# Patient Record
Sex: FEMALE | Race: WHITE | Employment: OTHER | URBAN - METROPOLITAN AREA
[De-identification: names, ages, dates, MRNs, and addresses within clinical notes are randomized per-mention and may not be internally consistent; named-entity substitution may affect disease eponyms.]

---

## 2017-05-25 ENCOUNTER — HOSPITAL ENCOUNTER (OUTPATIENT)
Dept: ULTRASOUND IMAGING | Age: 42
Discharge: HOME | End: 2017-05-25
Attending: UROLOGY | Admitting: UROLOGY

## 2017-05-25 DIAGNOSIS — N30.20 ACUTE ON CHRONIC CYSTITIS: ICD-10-CM

## 2017-05-25 DIAGNOSIS — N30.00 ACUTE ON CHRONIC CYSTITIS: ICD-10-CM

## 2017-05-25 DIAGNOSIS — N31.9 NEUROGENIC DYSFUNCTION OF THE URINARY BLADDER: ICD-10-CM

## 2017-05-25 DIAGNOSIS — N31.9 NEUROMUSCULAR DYSFUNCTION OF BLADDER: ICD-10-CM

## 2018-05-10 ENCOUNTER — HOSPITAL ENCOUNTER (OUTPATIENT)
Dept: ULTRASOUND IMAGING | Age: 43
Discharge: HOME | End: 2018-05-10
Attending: UROLOGY | Admitting: UROLOGY

## 2018-05-10 DIAGNOSIS — N31.9 NEUROMUSCULAR DYSFUNCTION OF BLADDER: ICD-10-CM

## 2018-05-10 DIAGNOSIS — N31.9 NEUROGENIC DYSFUNCTION OF THE URINARY BLADDER: ICD-10-CM

## 2020-01-30 RX ORDER — TRIMETHOPRIM 100 MG/1
100 TABLET ORAL DAILY
COMMUNITY

## 2020-02-05 ENCOUNTER — ANESTHESIA (OUTPATIENT)
Dept: OPERATING ROOM | Age: 45
End: 2020-02-05

## 2020-02-05 ENCOUNTER — ANESTHESIA EVENT (OUTPATIENT)
Dept: OPERATING ROOM | Age: 45
End: 2020-02-05

## 2020-02-05 ENCOUNTER — HOSPITAL ENCOUNTER (OUTPATIENT)
Age: 45
Setting detail: OUTPATIENT SURGERY
Discharge: HOME | End: 2020-02-05
Attending: DENTIST | Admitting: DENTIST

## 2020-02-05 VITALS
OXYGEN SATURATION: 100 % | DIASTOLIC BLOOD PRESSURE: 70 MMHG | SYSTOLIC BLOOD PRESSURE: 122 MMHG | RESPIRATION RATE: 7 BRPM

## 2020-02-05 VITALS
TEMPERATURE: 96.7 F | WEIGHT: 58 LBS | BODY MASS INDEX: 13.42 KG/M2 | DIASTOLIC BLOOD PRESSURE: 74 MMHG | RESPIRATION RATE: 18 BRPM | SYSTOLIC BLOOD PRESSURE: 121 MMHG | OXYGEN SATURATION: 100 % | HEIGHT: 55 IN | HEART RATE: 90 BPM

## 2020-02-05 LAB — PREGNANCY, URINE: NEGATIVE

## 2020-02-05 PROCEDURE — 2580000003 HC RX 258: Performed by: DENTIST

## 2020-02-05 PROCEDURE — 84703 CHORIONIC GONADOTROPIN ASSAY: CPT

## 2020-02-05 PROCEDURE — 2500000003 HC RX 250 WO HCPCS: Performed by: DENTIST

## 2020-02-05 PROCEDURE — 3700000000 HC ANESTHESIA ATTENDED CARE: Performed by: DENTIST

## 2020-02-05 PROCEDURE — 7100000011 HC PHASE II RECOVERY - ADDTL 15 MIN: Performed by: DENTIST

## 2020-02-05 PROCEDURE — 6360000002 HC RX W HCPCS: Performed by: DENTIST

## 2020-02-05 PROCEDURE — 7100000010 HC PHASE II RECOVERY - FIRST 15 MIN: Performed by: DENTIST

## 2020-02-05 PROCEDURE — 3700000001 HC ADD 15 MINUTES (ANESTHESIA): Performed by: DENTIST

## 2020-02-05 PROCEDURE — 6360000002 HC RX W HCPCS: Performed by: NURSE ANESTHETIST, CERTIFIED REGISTERED

## 2020-02-05 PROCEDURE — 2500000003 HC RX 250 WO HCPCS: Performed by: NURSE ANESTHETIST, CERTIFIED REGISTERED

## 2020-02-05 PROCEDURE — 3600000013 HC SURGERY LEVEL 3 ADDTL 15MIN: Performed by: DENTIST

## 2020-02-05 PROCEDURE — 6370000000 HC RX 637 (ALT 250 FOR IP): Performed by: DENTIST

## 2020-02-05 PROCEDURE — 2580000003 HC RX 258: Performed by: ANESTHESIOLOGY

## 2020-02-05 PROCEDURE — 7100000001 HC PACU RECOVERY - ADDTL 15 MIN: Performed by: DENTIST

## 2020-02-05 PROCEDURE — 7100000000 HC PACU RECOVERY - FIRST 15 MIN: Performed by: DENTIST

## 2020-02-05 PROCEDURE — 3600000003 HC SURGERY LEVEL 3 BASE: Performed by: DENTIST

## 2020-02-05 PROCEDURE — 2709999900 HC NON-CHARGEABLE SUPPLY: Performed by: DENTIST

## 2020-02-05 RX ORDER — ONDANSETRON 2 MG/ML
INJECTION INTRAMUSCULAR; INTRAVENOUS PRN
Status: DISCONTINUED | OUTPATIENT
Start: 2020-02-05 | End: 2020-02-05 | Stop reason: SDUPTHER

## 2020-02-05 RX ORDER — ROCURONIUM BROMIDE 10 MG/ML
INJECTION, SOLUTION INTRAVENOUS PRN
Status: DISCONTINUED | OUTPATIENT
Start: 2020-02-05 | End: 2020-02-05 | Stop reason: SDUPTHER

## 2020-02-05 RX ORDER — MEPERIDINE HYDROCHLORIDE 50 MG/ML
12.5 INJECTION INTRAMUSCULAR; INTRAVENOUS; SUBCUTANEOUS EVERY 5 MIN PRN
Status: DISCONTINUED | OUTPATIENT
Start: 2020-02-05 | End: 2020-02-05 | Stop reason: HOSPADM

## 2020-02-05 RX ORDER — LIDOCAINE HYDROCHLORIDE 10 MG/ML
0.3 INJECTION, SOLUTION EPIDURAL; INFILTRATION; INTRACAUDAL; PERINEURAL
Status: DISCONTINUED | OUTPATIENT
Start: 2020-02-05 | End: 2020-02-05 | Stop reason: HOSPADM

## 2020-02-05 RX ORDER — OXYCODONE HYDROCHLORIDE AND ACETAMINOPHEN 5; 325 MG/1; MG/1
2 TABLET ORAL PRN
Status: DISCONTINUED | OUTPATIENT
Start: 2020-02-05 | End: 2020-02-05 | Stop reason: HOSPADM

## 2020-02-05 RX ORDER — DEXAMETHASONE SODIUM PHOSPHATE 10 MG/ML
INJECTION INTRAMUSCULAR; INTRAVENOUS PRN
Status: DISCONTINUED | OUTPATIENT
Start: 2020-02-05 | End: 2020-02-05 | Stop reason: SDUPTHER

## 2020-02-05 RX ORDER — PHENYLEPHRINE HCL IN 0.9% NACL 1 MG/10 ML
SYRINGE (ML) INTRAVENOUS PRN
Status: DISCONTINUED | OUTPATIENT
Start: 2020-02-05 | End: 2020-02-05 | Stop reason: SDUPTHER

## 2020-02-05 RX ORDER — DIPHENHYDRAMINE HYDROCHLORIDE 50 MG/ML
6.25 INJECTION INTRAMUSCULAR; INTRAVENOUS
Status: DISCONTINUED | OUTPATIENT
Start: 2020-02-05 | End: 2020-02-05 | Stop reason: HOSPADM

## 2020-02-05 RX ORDER — FENTANYL CITRATE 50 UG/ML
INJECTION, SOLUTION INTRAMUSCULAR; INTRAVENOUS PRN
Status: DISCONTINUED | OUTPATIENT
Start: 2020-02-05 | End: 2020-02-05 | Stop reason: SDUPTHER

## 2020-02-05 RX ORDER — PROPOFOL 10 MG/ML
INJECTION, EMULSION INTRAVENOUS PRN
Status: DISCONTINUED | OUTPATIENT
Start: 2020-02-05 | End: 2020-02-05 | Stop reason: SDUPTHER

## 2020-02-05 RX ORDER — BUPIVACAINE HYDROCHLORIDE AND EPINEPHRINE 2.5; 5 MG/ML; UG/ML
INJECTION, SOLUTION EPIDURAL; INFILTRATION; INTRACAUDAL; PERINEURAL PRN
Status: DISCONTINUED | OUTPATIENT
Start: 2020-02-05 | End: 2020-02-05 | Stop reason: ALTCHOICE

## 2020-02-05 RX ORDER — SODIUM CHLORIDE 0.9 % (FLUSH) 0.9 %
10 SYRINGE (ML) INJECTION EVERY 12 HOURS SCHEDULED
Status: DISCONTINUED | OUTPATIENT
Start: 2020-02-05 | End: 2020-02-05 | Stop reason: HOSPADM

## 2020-02-05 RX ORDER — OXYMETAZOLINE HYDROCHLORIDE 0.05 G/100ML
2 SPRAY NASAL ONCE
Status: COMPLETED | OUTPATIENT
Start: 2020-02-05 | End: 2020-02-05

## 2020-02-05 RX ORDER — SODIUM CHLORIDE, SODIUM LACTATE, POTASSIUM CHLORIDE, CALCIUM CHLORIDE 600; 310; 30; 20 MG/100ML; MG/100ML; MG/100ML; MG/100ML
INJECTION, SOLUTION INTRAVENOUS CONTINUOUS
Status: DISCONTINUED | OUTPATIENT
Start: 2020-02-05 | End: 2020-02-05 | Stop reason: HOSPADM

## 2020-02-05 RX ORDER — SODIUM CHLORIDE 0.9 % (FLUSH) 0.9 %
10 SYRINGE (ML) INJECTION PRN
Status: DISCONTINUED | OUTPATIENT
Start: 2020-02-05 | End: 2020-02-05 | Stop reason: HOSPADM

## 2020-02-05 RX ORDER — HYDRALAZINE HYDROCHLORIDE 20 MG/ML
5 INJECTION INTRAMUSCULAR; INTRAVENOUS EVERY 30 MIN PRN
Status: DISCONTINUED | OUTPATIENT
Start: 2020-02-05 | End: 2020-02-05 | Stop reason: HOSPADM

## 2020-02-05 RX ORDER — MAGNESIUM HYDROXIDE 1200 MG/15ML
LIQUID ORAL CONTINUOUS PRN
Status: COMPLETED | OUTPATIENT
Start: 2020-02-05 | End: 2020-02-05

## 2020-02-05 RX ORDER — ONDANSETRON 2 MG/ML
4 INJECTION INTRAMUSCULAR; INTRAVENOUS EVERY 30 MIN PRN
Status: DISCONTINUED | OUTPATIENT
Start: 2020-02-05 | End: 2020-02-05 | Stop reason: HOSPADM

## 2020-02-05 RX ORDER — OXYMETAZOLINE HYDROCHLORIDE 0.05 G/100ML
SPRAY NASAL
Status: DISCONTINUED
Start: 2020-02-05 | End: 2020-02-05 | Stop reason: HOSPADM

## 2020-02-05 RX ORDER — LIDOCAINE HYDROCHLORIDE 20 MG/ML
INJECTION, SOLUTION INFILTRATION; PERINEURAL PRN
Status: DISCONTINUED | OUTPATIENT
Start: 2020-02-05 | End: 2020-02-05 | Stop reason: SDUPTHER

## 2020-02-05 RX ORDER — MIDAZOLAM HYDROCHLORIDE 1 MG/ML
INJECTION INTRAMUSCULAR; INTRAVENOUS PRN
Status: DISCONTINUED | OUTPATIENT
Start: 2020-02-05 | End: 2020-02-05 | Stop reason: SDUPTHER

## 2020-02-05 RX ORDER — LABETALOL HYDROCHLORIDE 5 MG/ML
5 INJECTION, SOLUTION INTRAVENOUS
Status: DISCONTINUED | OUTPATIENT
Start: 2020-02-05 | End: 2020-02-05 | Stop reason: HOSPADM

## 2020-02-05 RX ORDER — OXYCODONE HYDROCHLORIDE AND ACETAMINOPHEN 5; 325 MG/1; MG/1
1 TABLET ORAL PRN
Status: DISCONTINUED | OUTPATIENT
Start: 2020-02-05 | End: 2020-02-05 | Stop reason: HOSPADM

## 2020-02-05 RX ADMIN — FENTANYL CITRATE 25 MCG: 50 INJECTION INTRAMUSCULAR; INTRAVENOUS at 07:32

## 2020-02-05 RX ADMIN — LIDOCAINE HYDROCHLORIDE 40 MG: 20 INJECTION, SOLUTION INFILTRATION; PERINEURAL at 07:33

## 2020-02-05 RX ADMIN — Medication 100 MCG: at 07:39

## 2020-02-05 RX ADMIN — PROPOFOL 60 MG: 10 INJECTION, EMULSION INTRAVENOUS at 07:32

## 2020-02-05 RX ADMIN — SODIUM CHLORIDE, POTASSIUM CHLORIDE, SODIUM LACTATE AND CALCIUM CHLORIDE: 600; 310; 30; 20 INJECTION, SOLUTION INTRAVENOUS at 07:07

## 2020-02-05 RX ADMIN — ONDANSETRON 2.6 MG: 2 INJECTION INTRAMUSCULAR; INTRAVENOUS at 07:45

## 2020-02-05 RX ADMIN — DEXAMETHASONE SODIUM PHOSPHATE 2.6 MG: 10 INJECTION INTRAMUSCULAR; INTRAVENOUS at 07:45

## 2020-02-05 RX ADMIN — CEFAZOLIN 1 G: 1 INJECTION, POWDER, FOR SOLUTION INTRAMUSCULAR; INTRAVENOUS at 07:39

## 2020-02-05 RX ADMIN — ROCURONIUM BROMIDE 15 MG: 10 SOLUTION INTRAVENOUS at 07:33

## 2020-02-05 RX ADMIN — LIDOCAINE HYDROCHLORIDE 60 MG: 20 INJECTION, SOLUTION INFILTRATION; PERINEURAL at 07:32

## 2020-02-05 RX ADMIN — Medication 200 MCG: at 07:42

## 2020-02-05 RX ADMIN — OXYMETAZOLINE HCL 2 SPRAY: 0.05 SPRAY NASAL at 07:07

## 2020-02-05 RX ADMIN — MIDAZOLAM HYDROCHLORIDE 2 MG: 2 INJECTION, SOLUTION INTRAMUSCULAR; INTRAVENOUS at 07:22

## 2020-02-05 RX ADMIN — SUGAMMADEX 60 MG: 100 INJECTION, SOLUTION INTRAVENOUS at 08:00

## 2020-02-05 ASSESSMENT — PULMONARY FUNCTION TESTS
PIF_VALUE: 13
PIF_VALUE: 1
PIF_VALUE: 14
PIF_VALUE: 18
PIF_VALUE: 2
PIF_VALUE: 18
PIF_VALUE: 3
PIF_VALUE: 18
PIF_VALUE: 24
PIF_VALUE: 17
PIF_VALUE: 15
PIF_VALUE: 0
PIF_VALUE: 15
PIF_VALUE: 1
PIF_VALUE: 0
PIF_VALUE: 14
PIF_VALUE: 24
PIF_VALUE: 0
PIF_VALUE: 35
PIF_VALUE: 17
PIF_VALUE: 0
PIF_VALUE: 2
PIF_VALUE: 23
PIF_VALUE: 14
PIF_VALUE: 0
PIF_VALUE: 18
PIF_VALUE: 28
PIF_VALUE: 0
PIF_VALUE: 0
PIF_VALUE: 25
PIF_VALUE: 19
PIF_VALUE: 3
PIF_VALUE: 0
PIF_VALUE: 3
PIF_VALUE: 0
PIF_VALUE: 0
PIF_VALUE: 14
PIF_VALUE: 0
PIF_VALUE: 2
PIF_VALUE: 19
PIF_VALUE: 18
PIF_VALUE: 17
PIF_VALUE: 25
PIF_VALUE: 18
PIF_VALUE: 22
PIF_VALUE: 15
PIF_VALUE: 0
PIF_VALUE: 0
PIF_VALUE: 6
PIF_VALUE: 26
PIF_VALUE: 3
PIF_VALUE: 0
PIF_VALUE: 1
PIF_VALUE: 0
PIF_VALUE: 0
PIF_VALUE: 2
PIF_VALUE: 15
PIF_VALUE: 18
PIF_VALUE: 0
PIF_VALUE: 18

## 2020-02-05 ASSESSMENT — PAIN SCALES - GENERAL
PAINLEVEL_OUTOF10: 0

## 2020-02-05 NOTE — ANESTHESIA POSTPROCEDURE EVALUATION
Department of Anesthesiology  Postprocedure Note    Patient: Angely Vasquez  MRN: [id number]  YOB: 1975  Date of evaluation: 2/5/2020  Time:  2:50 PM     Procedure Summary     Date:  02/05/20 Room / Location:  [facility] [address] [facility]    Anesthesia Start:  2878 Anesthesia Stop:  1797    Procedure:  DENTAL EXTRACTION # 19 (N/A Mouth) Diagnosis:       Primary active dental caries extending into pulp      (DENTAL CARIES)    Surgeon:  Reinier Kilgore DMD Responsible Provider:  Joselo Rivas MD    Anesthesia Type:  general ASA Status:  2          Anesthesia Type: general    Lewis Phase I: Lewis Score: 10    Lewis Phase II: Lewis Score: 10    Last vitals: Reviewed and per EMR flowsheets.        Anesthesia Post Evaluation    Comments: Postoperative Anesthesia Note    Name:    Angely Vasquez  MRN:      [id number]    Patient Vitals in the past 12 hrs:  02/05/20 0915, BP:121/74, Pulse:90, Resp:18, SpO2:100 %  02/05/20 0900, BP:126/75, Pulse:83, Resp:10, SpO2:99 %  02/05/20 0850, BP:131/80, Pulse:94, Resp:16, SpO2:98 %  02/05/20 0840, BP:132/84, Pulse:91, Resp:14, SpO2:97 %  02/05/20 0835, BP:122/78, Temp:96.7 °F (35.9 °C), Temp src:Temporal, Pulse:94, Resp:8, SpO2:98 %  02/05/20 0830, BP:119/78, Pulse:96, Resp:12, SpO2:99 %  02/05/20 0825, Pulse:94, Resp:18, SpO2:97 %  02/05/20 0820, BP:124/73, Pulse:90, Resp:18, SpO2:96 %  02/05/20 0815, BP:113/69, Temp:97.6 °F (36.4 °C), Temp src:Temporal, Pulse:95, Resp:16, SpO2:99 %  02/05/20 0651, BP:(!) 156/91, Temp:98.3 °F (36.8 °C), Pulse:109, Resp:16, SpO2:100 %     LABS:    CBC  Lab Results       Component                Value               Date/Time                  WBC                      9.5                 02/14/2016 05:21 AM        HGB                      11.0 (L)            02/14/2016 05:21 AM        HCT                      35.1 (L)            02/14/2016 05:21 AM        PLT                      690 (H)             02/14/2016 05:21 AM RENAL  Lab Results       Component                Value               Date/Time                  NA                       136                 02/14/2016 05:21 AM        K                        4.3                 02/14/2016 05:21 AM        CL                       95 (L)              02/14/2016 05:21 AM        CO2                      33 (H)              02/14/2016 05:21 AM        BUN                      10                  02/14/2016 05:21 AM        CREATININE               <0.5 (L)            02/14/2016 05:21 AM        GLUCOSE                  103 (H)             02/14/2016 05:21 AM   COAGS  No results found for: PROTIME, INR, APTT    Intake & Output: In: 900 (P.O.:100; I.V.:800)  Out: -     Nausea & Vomiting:  No    Level of Consciousness:  Awake    Pain Assessment:  Adequate analgesia    Anesthesia Complications:  No apparent anesthetic complications    SUMMARY      Vital signs stable  OK to discharge from Stage I post anesthesia care.   Care transferred from Anesthesiology department on discharge from perioperative area

## 2020-02-05 NOTE — ANESTHESIA PRE PROCEDURE
0.3 mL  0.3 mL Intradermal Once PRN Sue Mayfield MD        CEFAZOLIN 2 G D5W 100 ML IVPB IVPB             oxymetazoline (AFRIN) 0.05 % nasal spray             HYDROmorphone (DILAUDID) injection 0.5 mg  0.5 mg Intravenous Q10 Min PRN Ana Encinas MD        HYDROmorphone (DILAUDID) injection 0.5 mg  0.5 mg Intravenous Q5 Min PRN Ana Encinas MD        oxyCODONE-acetaminophen (PERCOCET) 5-325 MG per tablet 1 tablet  1 tablet Oral PRN Ana Encinas MD        Or    oxyCODONE-acetaminophen (PERCOCET) 5-325 MG per tablet 2 tablet  2 tablet Oral PRN Ana Encinas MD        diphenhydrAMINE (BENADRYL) injection 6.25 mg  6.25 mg Intravenous Once PRN Ana Encinas MD        ondansetron [facility]) injection 4 mg  4 mg Intravenous Q30 Min PRN Ana Encinas MD        labetalol (NORMODYNE;TRANDATE) injection 5 mg  5 mg Intravenous Q15 Min PRN Ana Encinas MD        hydrALAZINE (APRESOLINE) injection 5 mg  5 mg Intravenous Q30 Min PRN Ana Encinas MD        meperidine (DEMEROL) injection 12.5 mg  12.5 mg Intravenous Q5 Min PRN Ana Encinas MD           Allergies:  No Known Allergies    Problem List:    Patient Active Problem List   Diagnosis Code    Pneumonia J18.9    HTN (hypertension) I10    Spina bifida ([facility] [address] Q05.9       Past Medical History:        Diagnosis Date    Hypertension     Mental retardation     functions ~ age 14    Scoliosis     Hard time laying flat    Spina bifida ([address]     Wheelchair bound    Thyroid disease     hyperthyroidism    Urinary incontinence     self caths QID       Past Surgical History:        Procedure Laterality Date    BACK SURGERY      Rods for scoliosis    LEG SURGERY      Multiple    VENTRICULOPERITONEAL SHUNT      WISDOM TOOTH EXTRACTION         Social History:    Social History     Tobacco Use    Smoking status: Never Smoker    Smokeless tobacco: Never Used   Substance Use Topics    Alcohol use:  No Counseling given: Not Answered      Vital Signs (Current):   Vitals:    01/30/20 0821 02/05/20 0651   BP:  (!) 156/91   Pulse:  109   Resp:  16   Temp:  98.3 °F (36.8 °C)   SpO2:  100%   Weight: 58 lb (26.3 kg)    Height: 4' (1.219 m)                                               BP Readings from Last 3 Encounters:   02/05/20 (!) 156/91   02/14/16 130/76   08/17/12 127/84       NPO Status: Time of last liquid consumption: 2000                        Time of last solid consumption: 2000                        Date of last liquid consumption: 02/04/20                        Date of last solid food consumption: 02/04/20    BMI:   Wt Readings from Last 3 Encounters:   01/30/20 58 lb (26.3 kg)   02/07/16 64 lb (29 kg)   01/06/12 58 lb (26.3 kg)     Body mass index is 17.7 kg/m². CBC:   Lab Results   Component Value Date    WBC 9.5 02/14/2016    RBC 4.28 02/14/2016    HGB 11.0 02/14/2016    HCT 35.1 02/14/2016    MCV 82.0 02/14/2016    RDW 14.8 02/14/2016     02/14/2016       CMP:   Lab Results   Component Value Date     02/14/2016    K 4.3 02/14/2016    CL 95 02/14/2016    CO2 33 02/14/2016    BUN 10 02/14/2016    CREATININE <0.5 02/14/2016    GFRAA >60 02/14/2016    GFRAA >60 11/29/2011    AGRATIO 0.8 02/07/2016    LABGLOM >60 02/14/2016    GLUCOSE 103 02/14/2016    PROT 7.1 02/07/2016    PROT 7.7 11/29/2011    CALCIUM 9.0 02/14/2016    BILITOT <0.2 02/07/2016    ALKPHOS 81 02/07/2016    AST 21 02/07/2016    ALT 11 02/07/2016       POC Tests: No results for input(s): POCGLU, POCNA, POCK, POCCL, POCBUN, POCHEMO, POCHCT in the last 72 hours.     Coags: No results found for: PROTIME, INR, APTT    HCG (If Applicable): No results found for: PREGTESTUR, PREGSERUM, HCG, HCGQUANT     ABGs:   Lab Results   Component Value Date    PHART 7.449 02/09/2016    PO2ART 67.6 02/09/2016    MIU6FQH 48.7 02/09/2016    NHZ7MSV 33.0 02/09/2016    BEART 7.9 02/09/2016    Z9PBABYG 93.8 02/09/2016 Type & Screen (If Applicable):  No results found for: LABABO, [address]    Anesthesia Evaluation  Patient summary reviewed and Nursing notes reviewed no history of anesthetic complications:   Airway: Mallampati: II     Neck ROM: limited   Dental:          Pulmonary:   (+) pneumonia:                             Cardiovascular:    (+) hypertension:,                   Neuro/Psych:   (+) psychiatric history:            GI/Hepatic/Renal:             Endo/Other:                     Abdominal:           Vascular:                                      Anesthesia Plan      general     ASA 3       Induction: intravenous. Anesthetic plan and risks discussed with patient. Plan discussed with CRNA.                 Jessica Smith MD   2/5/2020

## 2021-06-24 ENCOUNTER — HOSPITAL ENCOUNTER (OUTPATIENT)
Dept: ULTRASOUND IMAGING | Age: 46
Discharge: HOME | End: 2021-06-24
Payer: MEDICARE

## 2021-06-24 ENCOUNTER — HOSPITAL ENCOUNTER (OUTPATIENT)
Age: 46
Discharge: HOME | End: 2021-06-24
Payer: MEDICARE

## 2021-06-24 DIAGNOSIS — R33.9 RETENTION OF URINE, UNSPECIFIED: ICD-10-CM

## 2021-06-24 DIAGNOSIS — N30.20 CHRONIC CYSTITIS WITHOUT HEMATURIA: ICD-10-CM

## 2021-06-24 DIAGNOSIS — N31.9 NEUROMUSCULAR DYSFUNCTION OF BLADDER, UNSPECIFIED: ICD-10-CM

## 2021-06-24 PROCEDURE — 82607 VITAMIN B-12: CPT

## 2021-06-24 PROCEDURE — 76770 US EXAM ABDO BACK WALL COMP: CPT

## 2021-06-24 PROCEDURE — 36415 COLL VENOUS BLD VENIPUNCTURE: CPT

## 2021-06-24 PROCEDURE — 80048 BASIC METABOLIC PNL TOTAL CA: CPT

## 2021-06-25 LAB
ANION GAP SERPL CALCULATED.3IONS-SCNC: 13 MMOL/L (ref 3–16)
BUN BLDV-MCNC: 10 MG/DL (ref 7–20)
CALCIUM SERPL-MCNC: 9.7 MG/DL (ref 8.3–10.6)
CHLORIDE BLD-SCNC: 99 MMOL/L (ref 99–110)
CO2: 26 MMOL/L (ref 21–32)
CREAT SERPL-MCNC: <0.5 MG/DL (ref 0.6–1.1)
GFR AFRICAN AMERICAN: >60
GFR NON-AFRICAN AMERICAN: >60
GLUCOSE BLD-MCNC: 64 MG/DL (ref 70–99)
POTASSIUM SERPL-SCNC: 4.6 MMOL/L (ref 3.5–5.1)
SODIUM BLD-SCNC: 138 MMOL/L (ref 136–145)
VITAMIN B-12: 427 PG/ML (ref 211–911)

## 2022-01-01 ENCOUNTER — APPOINTMENT (OUTPATIENT)
Dept: GENERAL RADIOLOGY | Age: 47
DRG: 314 | End: 2022-01-01
Payer: MEDICARE

## 2022-01-01 ENCOUNTER — HOSPITAL ENCOUNTER (INPATIENT)
Age: 47
LOS: 7 days | DRG: 314 | End: 2022-12-29
Attending: EMERGENCY MEDICINE | Admitting: INTERNAL MEDICINE
Payer: MEDICARE

## 2022-01-01 ENCOUNTER — APPOINTMENT (OUTPATIENT)
Dept: CT IMAGING | Age: 47
DRG: 314 | End: 2022-01-01
Payer: MEDICARE

## 2022-01-01 VITALS
HEART RATE: 104 BPM | HEIGHT: 55 IN | BODY MASS INDEX: 24.59 KG/M2 | SYSTOLIC BLOOD PRESSURE: 110 MMHG | OXYGEN SATURATION: 99 % | RESPIRATION RATE: 14 BRPM | TEMPERATURE: 98 F | DIASTOLIC BLOOD PRESSURE: 69 MMHG | WEIGHT: 106.26 LBS

## 2022-01-01 DIAGNOSIS — R65.21 SEPTIC SHOCK (HCC): Primary | ICD-10-CM

## 2022-01-01 DIAGNOSIS — A41.9 SEPTIC SHOCK (HCC): Primary | ICD-10-CM

## 2022-01-01 DIAGNOSIS — N30.01 ACUTE CYSTITIS WITH HEMATURIA: ICD-10-CM

## 2022-01-01 DIAGNOSIS — Z99.2 DIALYSIS PATIENT (HCC): ICD-10-CM

## 2022-01-01 LAB
A/G RATIO: 0.7 (ref 1.1–2.2)
A/G RATIO: 1 (ref 1.1–2.2)
A/G RATIO: 1.1 (ref 1.1–2.2)
A/G RATIO: 1.1 (ref 1.1–2.2)
A/G RATIO: 1.2 (ref 1.1–2.2)
A/G RATIO: 1.2 (ref 1.1–2.2)
A/G RATIO: 1.3 (ref 1.1–2.2)
A/G RATIO: 1.3 (ref 1.1–2.2)
A/G RATIO: 1.4 (ref 1.1–2.2)
ABO/RH: NORMAL
ACANTHOCYTES: ABNORMAL
ALBUMIN SERPL-MCNC: 1.5 G/DL (ref 3.4–5)
ALBUMIN SERPL-MCNC: 1.6 G/DL (ref 3.4–5)
ALBUMIN SERPL-MCNC: 1.7 G/DL (ref 3.4–5)
ALBUMIN SERPL-MCNC: 1.7 G/DL (ref 3.4–5)
ALBUMIN SERPL-MCNC: 1.9 G/DL (ref 3.4–5)
ALBUMIN SERPL-MCNC: 2.1 G/DL (ref 3.4–5)
ALBUMIN SERPL-MCNC: 2.1 G/DL (ref 3.4–5)
ALBUMIN SERPL-MCNC: 2.2 G/DL (ref 3.4–5)
ALBUMIN SERPL-MCNC: 2.3 G/DL (ref 3.4–5)
ALBUMIN SERPL-MCNC: 2.3 G/DL (ref 3.4–5)
ALBUMIN SERPL-MCNC: 2.4 G/DL (ref 3.4–5)
ALBUMIN SERPL-MCNC: 2.4 G/DL (ref 3.4–5)
ALBUMIN SERPL-MCNC: 2.5 G/DL (ref 3.4–5)
ALBUMIN SERPL-MCNC: 2.7 G/DL (ref 3.4–5)
ALP BLD-CCNC: 105 U/L (ref 40–129)
ALP BLD-CCNC: 156 U/L (ref 40–129)
ALP BLD-CCNC: 156 U/L (ref 40–129)
ALP BLD-CCNC: 160 U/L (ref 40–129)
ALP BLD-CCNC: 165 U/L (ref 40–129)
ALP BLD-CCNC: 171 U/L (ref 40–129)
ALP BLD-CCNC: 178 U/L (ref 40–129)
ALP BLD-CCNC: 181 U/L (ref 40–129)
ALP BLD-CCNC: 186 U/L (ref 40–129)
ALP BLD-CCNC: 214 U/L (ref 40–129)
ALP BLD-CCNC: 248 U/L (ref 40–129)
ALP BLD-CCNC: 250 U/L (ref 40–129)
ALP BLD-CCNC: 259 U/L (ref 40–129)
ALP BLD-CCNC: 310 U/L (ref 40–129)
ALP BLD-CCNC: 330 U/L (ref 40–129)
ALP BLD-CCNC: 90 U/L (ref 40–129)
ALT SERPL-CCNC: 1167 U/L (ref 10–40)
ALT SERPL-CCNC: 122 U/L (ref 10–40)
ALT SERPL-CCNC: 136 U/L (ref 10–40)
ALT SERPL-CCNC: 140 U/L (ref 10–40)
ALT SERPL-CCNC: 144 U/L (ref 10–40)
ALT SERPL-CCNC: 16 U/L (ref 10–40)
ALT SERPL-CCNC: 167 U/L (ref 10–40)
ALT SERPL-CCNC: 1817 U/L (ref 10–40)
ALT SERPL-CCNC: 187 U/L (ref 10–40)
ALT SERPL-CCNC: 201 U/L (ref 10–40)
ALT SERPL-CCNC: 202 U/L (ref 10–40)
ALT SERPL-CCNC: 278 U/L (ref 10–40)
ALT SERPL-CCNC: 355 U/L (ref 10–40)
ALT SERPL-CCNC: 413 U/L (ref 10–40)
ALT SERPL-CCNC: 560 U/L (ref 10–40)
ALT SERPL-CCNC: 962 U/L (ref 10–40)
ANION GAP SERPL CALCULATED.3IONS-SCNC: 11 MMOL/L (ref 3–16)
ANION GAP SERPL CALCULATED.3IONS-SCNC: 13 MMOL/L (ref 3–16)
ANION GAP SERPL CALCULATED.3IONS-SCNC: 15 MMOL/L (ref 3–16)
ANION GAP SERPL CALCULATED.3IONS-SCNC: 20 MMOL/L (ref 3–16)
ANION GAP SERPL CALCULATED.3IONS-SCNC: 21 MMOL/L (ref 3–16)
ANION GAP SERPL CALCULATED.3IONS-SCNC: 25 MMOL/L (ref 3–16)
ANION GAP SERPL CALCULATED.3IONS-SCNC: 5 MMOL/L (ref 3–16)
ANION GAP SERPL CALCULATED.3IONS-SCNC: 7 MMOL/L (ref 3–16)
ANION GAP SERPL CALCULATED.3IONS-SCNC: 8 MMOL/L (ref 3–16)
ANION GAP SERPL CALCULATED.3IONS-SCNC: 9 MMOL/L (ref 3–16)
ANISOCYTOSIS: ABNORMAL
ANTIBODY SCREEN: NORMAL
AST SERPL-CCNC: 1125 U/L (ref 15–37)
AST SERPL-CCNC: 116 U/L (ref 15–37)
AST SERPL-CCNC: 132 U/L (ref 15–37)
AST SERPL-CCNC: 136 U/L (ref 15–37)
AST SERPL-CCNC: 150 U/L (ref 15–37)
AST SERPL-CCNC: 206 U/L (ref 15–37)
AST SERPL-CCNC: 243 U/L (ref 15–37)
AST SERPL-CCNC: 264 U/L (ref 15–37)
AST SERPL-CCNC: 3162 U/L (ref 15–37)
AST SERPL-CCNC: 349 U/L (ref 15–37)
AST SERPL-CCNC: 37 U/L (ref 15–37)
AST SERPL-CCNC: 4285 U/L (ref 15–37)
AST SERPL-CCNC: 71 U/L (ref 15–37)
AST SERPL-CCNC: 84 U/L (ref 15–37)
AST SERPL-CCNC: 85 U/L (ref 15–37)
AST SERPL-CCNC: 97 U/L (ref 15–37)
BACTERIA: ABNORMAL /HPF
BANDED NEUTROPHILS RELATIVE PERCENT: 15 % (ref 0–7)
BANDED NEUTROPHILS RELATIVE PERCENT: 16 % (ref 0–7)
BANDED NEUTROPHILS RELATIVE PERCENT: 17 % (ref 0–7)
BANDED NEUTROPHILS RELATIVE PERCENT: 17 % (ref 0–7)
BANDED NEUTROPHILS RELATIVE PERCENT: 34 % (ref 0–7)
BANDED NEUTROPHILS RELATIVE PERCENT: 51 % (ref 0–7)
BANDED NEUTROPHILS RELATIVE PERCENT: 62 % (ref 0–7)
BANDED NEUTROPHILS RELATIVE PERCENT: 63 % (ref 0–7)
BASE EXCESS ARTERIAL: -10 MMOL/L (ref -3–3)
BASE EXCESS ARTERIAL: -9.6 MMOL/L (ref -3–3)
BASE EXCESS ARTERIAL: 1 MMOL/L (ref -3–3)
BASE EXCESS ARTERIAL: 2 (ref -3–3)
BASE EXCESS ARTERIAL: 2.4 MMOL/L (ref -3–3)
BASE EXCESS ARTERIAL: 3.5 MMOL/L (ref -3–3)
BASE EXCESS ARTERIAL: 4.7 MMOL/L (ref -3–3)
BASE EXCESS VENOUS: -1.1 MMOL/L (ref -3–3)
BASE EXCESS VENOUS: -9.4 MMOL/L (ref -3–3)
BASE EXCESS VENOUS: 0.4 MMOL/L (ref -3–3)
BASE EXCESS VENOUS: 0.6 MMOL/L (ref -3–3)
BASOPHILS ABSOLUTE: 0 K/UL (ref 0–0.2)
BASOPHILS ABSOLUTE: 0.2 K/UL (ref 0–0.2)
BASOPHILS RELATIVE PERCENT: 0 %
BASOPHILS RELATIVE PERCENT: 1 %
BILIRUB SERPL-MCNC: 0.4 MG/DL (ref 0–1)
BILIRUB SERPL-MCNC: 0.6 MG/DL (ref 0–1)
BILIRUB SERPL-MCNC: 0.7 MG/DL (ref 0–1)
BILIRUB SERPL-MCNC: 0.7 MG/DL (ref 0–1)
BILIRUB SERPL-MCNC: 0.8 MG/DL (ref 0–1)
BILIRUB SERPL-MCNC: 0.8 MG/DL (ref 0–1)
BILIRUB SERPL-MCNC: 1.1 MG/DL (ref 0–1)
BILIRUB SERPL-MCNC: 1.3 MG/DL (ref 0–1)
BILIRUB SERPL-MCNC: 1.4 MG/DL (ref 0–1)
BILIRUB SERPL-MCNC: 1.6 MG/DL (ref 0–1)
BILIRUB SERPL-MCNC: 2.1 MG/DL (ref 0–1)
BILIRUB SERPL-MCNC: 2.9 MG/DL (ref 0–1)
BILIRUB SERPL-MCNC: 3 MG/DL (ref 0–1)
BILIRUBIN DIRECT: 0.4 MG/DL (ref 0–0.3)
BILIRUBIN DIRECT: <0.2 MG/DL (ref 0–0.3)
BILIRUBIN DIRECT: <0.2 MG/DL (ref 0–0.3)
BILIRUBIN URINE: NEGATIVE
BILIRUBIN, INDIRECT: 0.2 MG/DL (ref 0–1)
BILIRUBIN, INDIRECT: NORMAL MG/DL (ref 0–1)
BILIRUBIN, INDIRECT: NORMAL MG/DL (ref 0–1)
BLOOD BANK DISPENSE STATUS: NORMAL
BLOOD BANK DISPENSE STATUS: NORMAL
BLOOD BANK PRODUCT CODE: NORMAL
BLOOD BANK PRODUCT CODE: NORMAL
BLOOD CULTURE, ROUTINE: ABNORMAL
BLOOD CULTURE, ROUTINE: ABNORMAL
BLOOD, URINE: ABNORMAL
BPU ID: NORMAL
BPU ID: NORMAL
BUN BLDV-MCNC: 10 MG/DL (ref 7–20)
BUN BLDV-MCNC: 13 MG/DL (ref 7–20)
BUN BLDV-MCNC: 14 MG/DL (ref 7–20)
BUN BLDV-MCNC: 18 MG/DL (ref 7–20)
BUN BLDV-MCNC: 21 MG/DL (ref 7–20)
BUN BLDV-MCNC: 24 MG/DL (ref 7–20)
BUN BLDV-MCNC: 27 MG/DL (ref 7–20)
BUN BLDV-MCNC: 29 MG/DL (ref 7–20)
BUN BLDV-MCNC: 3 MG/DL (ref 7–20)
BUN BLDV-MCNC: 33 MG/DL (ref 7–20)
BUN BLDV-MCNC: 34 MG/DL (ref 7–20)
BUN BLDV-MCNC: 4 MG/DL (ref 7–20)
BUN BLDV-MCNC: 5 MG/DL (ref 7–20)
BUN BLDV-MCNC: 6 MG/DL (ref 7–20)
BUN BLDV-MCNC: 7 MG/DL (ref 7–20)
BUN BLDV-MCNC: 8 MG/DL (ref 7–20)
BURR CELLS: ABNORMAL
BURR CELLS: ABNORMAL
CALCIUM IONIZED: 0.91 MMOL/L (ref 1.12–1.32)
CALCIUM IONIZED: 0.91 MMOL/L (ref 1.12–1.32)
CALCIUM IONIZED: 0.96 MMOL/L (ref 1.12–1.32)
CALCIUM IONIZED: 0.98 MMOL/L (ref 1.12–1.32)
CALCIUM IONIZED: 1.01 MMOL/L (ref 1.12–1.32)
CALCIUM IONIZED: 1.02 MMOL/L (ref 1.12–1.32)
CALCIUM IONIZED: 1.02 MMOL/L (ref 1.12–1.32)
CALCIUM IONIZED: 1.05 MMOL/L (ref 1.12–1.32)
CALCIUM IONIZED: 1.06 MMOL/L (ref 1.12–1.32)
CALCIUM IONIZED: 1.06 MMOL/L (ref 1.12–1.32)
CALCIUM IONIZED: 1.07 MMOL/L (ref 1.12–1.32)
CALCIUM IONIZED: 1.07 MMOL/L (ref 1.12–1.32)
CALCIUM IONIZED: 1.08 MMOL/L (ref 1.12–1.32)
CALCIUM IONIZED: 1.18 MMOL/L (ref 1.12–1.32)
CALCIUM SERPL-MCNC: 5.6 MG/DL (ref 8.3–10.6)
CALCIUM SERPL-MCNC: 6.1 MG/DL (ref 8.3–10.6)
CALCIUM SERPL-MCNC: 6.2 MG/DL (ref 8.3–10.6)
CALCIUM SERPL-MCNC: 6.6 MG/DL (ref 8.3–10.6)
CALCIUM SERPL-MCNC: 6.8 MG/DL (ref 8.3–10.6)
CALCIUM SERPL-MCNC: 7.2 MG/DL (ref 8.3–10.6)
CALCIUM SERPL-MCNC: 7.4 MG/DL (ref 8.3–10.6)
CALCIUM SERPL-MCNC: 7.4 MG/DL (ref 8.3–10.6)
CALCIUM SERPL-MCNC: 7.6 MG/DL (ref 8.3–10.6)
CALCIUM SERPL-MCNC: 7.7 MG/DL (ref 8.3–10.6)
CALCIUM SERPL-MCNC: 7.7 MG/DL (ref 8.3–10.6)
CALCIUM SERPL-MCNC: 7.9 MG/DL (ref 8.3–10.6)
CALCIUM SERPL-MCNC: 7.9 MG/DL (ref 8.3–10.6)
CALCIUM SERPL-MCNC: 8.7 MG/DL (ref 8.3–10.6)
CARBOXYHEMOGLOBIN ARTERIAL: 0.2 % (ref 0–1.5)
CARBOXYHEMOGLOBIN ARTERIAL: 0.3 % (ref 0–1.5)
CARBOXYHEMOGLOBIN: 0.6 % (ref 0–1.5)
CARBOXYHEMOGLOBIN: 0.6 % (ref 0–1.5)
CARBOXYHEMOGLOBIN: 1.2 % (ref 0–1.5)
CARBOXYHEMOGLOBIN: 4.6 % (ref 0–1.5)
CHLORIDE BLD-SCNC: 100 MMOL/L (ref 99–110)
CHLORIDE BLD-SCNC: 100 MMOL/L (ref 99–110)
CHLORIDE BLD-SCNC: 101 MMOL/L (ref 99–110)
CHLORIDE BLD-SCNC: 101 MMOL/L (ref 99–110)
CHLORIDE BLD-SCNC: 102 MMOL/L (ref 99–110)
CHLORIDE BLD-SCNC: 103 MMOL/L (ref 99–110)
CHLORIDE BLD-SCNC: 104 MMOL/L (ref 99–110)
CHLORIDE BLD-SCNC: 104 MMOL/L (ref 99–110)
CHLORIDE BLD-SCNC: 89 MMOL/L (ref 99–110)
CHLORIDE BLD-SCNC: 92 MMOL/L (ref 99–110)
CHLORIDE BLD-SCNC: 94 MMOL/L (ref 99–110)
CHLORIDE BLD-SCNC: 97 MMOL/L (ref 99–110)
CHLORIDE BLD-SCNC: 98 MMOL/L (ref 99–110)
CHLORIDE BLD-SCNC: 99 MMOL/L (ref 99–110)
CLARITY: ABNORMAL
CO2: 13 MMOL/L (ref 21–32)
CO2: 17 MMOL/L (ref 21–32)
CO2: 23 MMOL/L (ref 21–32)
CO2: 24 MMOL/L (ref 21–32)
CO2: 25 MMOL/L (ref 21–32)
CO2: 25 MMOL/L (ref 21–32)
CO2: 26 MMOL/L (ref 21–32)
CO2: 27 MMOL/L (ref 21–32)
COLOR: YELLOW
CREAT SERPL-MCNC: 0.6 MG/DL (ref 0.6–1.1)
CREAT SERPL-MCNC: 0.8 MG/DL (ref 0.6–1.1)
CREAT SERPL-MCNC: 0.8 MG/DL (ref 0.6–1.1)
CREAT SERPL-MCNC: 1 MG/DL (ref 0.6–1.1)
CREAT SERPL-MCNC: 1.1 MG/DL (ref 0.6–1.1)
CREAT SERPL-MCNC: 1.1 MG/DL (ref 0.6–1.1)
CREAT SERPL-MCNC: 1.2 MG/DL (ref 0.6–1.1)
CREAT SERPL-MCNC: 1.2 MG/DL (ref 0.6–1.1)
CREAT SERPL-MCNC: <0.5 MG/DL (ref 0.6–1.1)
CULTURE, BLOOD 2: NORMAL
CULTURE, RESPIRATORY: ABNORMAL
DESCRIPTION BLOOD BANK: NORMAL
DESCRIPTION BLOOD BANK: NORMAL
DOHLE BODIES: PRESENT
DOHLE BODIES: PRESENT
EKG ATRIAL RATE: 145 BPM
EKG ATRIAL RATE: 186 BPM
EKG DIAGNOSIS: NORMAL
EKG DIAGNOSIS: NORMAL
EKG P-R INTERVAL: 96 MS
EKG Q-T INTERVAL: 262 MS
EKG Q-T INTERVAL: 264 MS
EKG QRS DURATION: 48 MS
EKG QRS DURATION: 62 MS
EKG QTC CALCULATION (BAZETT): 410 MS
EKG QTC CALCULATION (BAZETT): 461 MS
EKG R AXIS: 262 DEGREES
EKG R AXIS: 39 DEGREES
EKG T AXIS: 45 DEGREES
EKG T AXIS: 68 DEGREES
EKG VENTRICULAR RATE: 145 BPM
EKG VENTRICULAR RATE: 186 BPM
EOSINOPHILS ABSOLUTE: 0 K/UL (ref 0–0.6)
EOSINOPHILS ABSOLUTE: 0.2 K/UL (ref 0–0.6)
EOSINOPHILS ABSOLUTE: 0.3 K/UL (ref 0–0.6)
EOSINOPHILS ABSOLUTE: 0.5 K/UL (ref 0–0.6)
EOSINOPHILS RELATIVE PERCENT: 0 %
EOSINOPHILS RELATIVE PERCENT: 1 %
EOSINOPHILS RELATIVE PERCENT: 2 %
EPITHELIAL CELLS, UA: ABNORMAL /HPF (ref 0–5)
GFR SERPL CREATININE-BSD FRML MDRD: 56 ML/MIN/{1.73_M2}
GFR SERPL CREATININE-BSD FRML MDRD: 56 ML/MIN/{1.73_M2}
GFR SERPL CREATININE-BSD FRML MDRD: >60 ML/MIN/{1.73_M2}
GLUCOSE BLD-MCNC: 102 MG/DL (ref 70–99)
GLUCOSE BLD-MCNC: 104 MG/DL (ref 70–99)
GLUCOSE BLD-MCNC: 105 MG/DL (ref 70–99)
GLUCOSE BLD-MCNC: 111 MG/DL (ref 70–99)
GLUCOSE BLD-MCNC: 121 MG/DL (ref 70–99)
GLUCOSE BLD-MCNC: 124 MG/DL (ref 70–99)
GLUCOSE BLD-MCNC: 128 MG/DL (ref 70–99)
GLUCOSE BLD-MCNC: 130 MG/DL (ref 70–99)
GLUCOSE BLD-MCNC: 132 MG/DL (ref 70–99)
GLUCOSE BLD-MCNC: 132 MG/DL (ref 70–99)
GLUCOSE BLD-MCNC: 134 MG/DL (ref 70–99)
GLUCOSE BLD-MCNC: 136 MG/DL (ref 70–99)
GLUCOSE BLD-MCNC: 137 MG/DL (ref 70–99)
GLUCOSE BLD-MCNC: 143 MG/DL (ref 70–99)
GLUCOSE BLD-MCNC: 15 MG/DL (ref 70–99)
GLUCOSE BLD-MCNC: 15 MG/DL (ref 70–99)
GLUCOSE BLD-MCNC: 156 MG/DL (ref 70–99)
GLUCOSE BLD-MCNC: 163 MG/DL (ref 70–99)
GLUCOSE BLD-MCNC: 174 MG/DL (ref 70–99)
GLUCOSE BLD-MCNC: 19 MG/DL (ref 70–99)
GLUCOSE BLD-MCNC: 20 MG/DL (ref 70–99)
GLUCOSE BLD-MCNC: 22 MG/DL (ref 70–99)
GLUCOSE BLD-MCNC: 25 MG/DL (ref 70–99)
GLUCOSE BLD-MCNC: 278 MG/DL (ref 70–99)
GLUCOSE BLD-MCNC: 307 MG/DL (ref 70–99)
GLUCOSE BLD-MCNC: 402 MG/DL (ref 70–99)
GLUCOSE BLD-MCNC: 473 MG/DL (ref 70–99)
GLUCOSE BLD-MCNC: 49 MG/DL (ref 70–99)
GLUCOSE BLD-MCNC: 54 MG/DL (ref 70–99)
GLUCOSE BLD-MCNC: 56 MG/DL (ref 70–99)
GLUCOSE BLD-MCNC: 64 MG/DL (ref 70–99)
GLUCOSE BLD-MCNC: 65 MG/DL (ref 70–99)
GLUCOSE BLD-MCNC: 67 MG/DL (ref 70–99)
GLUCOSE BLD-MCNC: 69 MG/DL (ref 70–99)
GLUCOSE BLD-MCNC: 69 MG/DL (ref 70–99)
GLUCOSE BLD-MCNC: 70 MG/DL (ref 70–99)
GLUCOSE BLD-MCNC: 70 MG/DL (ref 70–99)
GLUCOSE BLD-MCNC: 71 MG/DL (ref 70–99)
GLUCOSE BLD-MCNC: 72 MG/DL (ref 70–99)
GLUCOSE BLD-MCNC: 76 MG/DL (ref 70–99)
GLUCOSE BLD-MCNC: 77 MG/DL (ref 70–99)
GLUCOSE BLD-MCNC: 79 MG/DL (ref 70–99)
GLUCOSE BLD-MCNC: 80 MG/DL (ref 70–99)
GLUCOSE BLD-MCNC: 86 MG/DL (ref 70–99)
GLUCOSE BLD-MCNC: 87 MG/DL (ref 70–99)
GLUCOSE BLD-MCNC: 88 MG/DL (ref 70–99)
GLUCOSE BLD-MCNC: 91 MG/DL (ref 70–99)
GLUCOSE BLD-MCNC: 94 MG/DL (ref 70–99)
GLUCOSE BLD-MCNC: 96 MG/DL (ref 70–99)
GLUCOSE URINE: NEGATIVE MG/DL
GRAM STAIN RESULT: ABNORMAL
HCO3 ARTERIAL: 13.5 MMOL/L (ref 21–29)
HCO3 ARTERIAL: 13.5 MMOL/L (ref 21–29)
HCO3 ARTERIAL: 24.4 MMOL/L (ref 21–29)
HCO3 ARTERIAL: 25 MMOL/L (ref 21–29)
HCO3 ARTERIAL: 25.2 MMOL/L (ref 21–29)
HCO3 ARTERIAL: 25.3 MMOL/L (ref 21–29)
HCO3 ARTERIAL: 25.9 MMOL/L (ref 21–29)
HCO3 VENOUS: 14.6 MMOL/L (ref 23–29)
HCO3 VENOUS: 23.2 MMOL/L (ref 23–29)
HCO3 VENOUS: 23.5 MMOL/L (ref 23–29)
HCO3 VENOUS: 25.4 MMOL/L (ref 23–29)
HCT VFR BLD CALC: 18.4 % (ref 36–48)
HCT VFR BLD CALC: 20.6 % (ref 36–48)
HCT VFR BLD CALC: 22.3 % (ref 36–48)
HCT VFR BLD CALC: 22.8 % (ref 36–48)
HCT VFR BLD CALC: 23.1 % (ref 36–48)
HCT VFR BLD CALC: 23.8 % (ref 36–48)
HCT VFR BLD CALC: 25.5 % (ref 36–48)
HCT VFR BLD CALC: 26.2 % (ref 36–48)
HCT VFR BLD CALC: 28 % (ref 36–48)
HCT VFR BLD CALC: 28.3 % (ref 36–48)
HCT VFR BLD CALC: 31.6 % (ref 36–48)
HEMOGLOBIN, ART, EXTENDED: 7.3 G/DL (ref 12–16)
HEMOGLOBIN, ART, EXTENDED: 8.1 G/DL (ref 12–16)
HEMOGLOBIN, ART, EXTENDED: 9 G/DL (ref 12–16)
HEMOGLOBIN, ART, EXTENDED: 9.1 G/DL (ref 12–16)
HEMOGLOBIN, ART, EXTENDED: 9.1 G/DL (ref 12–16)
HEMOGLOBIN, ART, EXTENDED: 9.8 G/DL (ref 12–16)
HEMOGLOBIN: 6.1 G/DL (ref 12–16)
HEMOGLOBIN: 6.5 G/DL (ref 12–16)
HEMOGLOBIN: 7 GM/DL (ref 12–16)
HEMOGLOBIN: 7.1 G/DL (ref 12–16)
HEMOGLOBIN: 7.2 G/DL (ref 12–16)
HEMOGLOBIN: 7.2 G/DL (ref 12–16)
HEMOGLOBIN: 7.7 G/DL (ref 12–16)
HEMOGLOBIN: 7.9 G/DL (ref 12–16)
HEMOGLOBIN: 8.5 G/DL (ref 12–16)
HEMOGLOBIN: 8.7 G/DL (ref 12–16)
HEMOGLOBIN: 9.2 G/DL (ref 12–16)
HEMOGLOBIN: 9.3 G/DL (ref 12–16)
HYPOCHROMIA: ABNORMAL
INFLUENZA A: NOT DETECTED
INFLUENZA B: NOT DETECTED
KETONES, URINE: NEGATIVE MG/DL
LACTATE: 2.23 MMOL/L (ref 0.4–2)
LACTIC ACID, SEPSIS: 10.2 MMOL/L (ref 0.4–1.9)
LACTIC ACID, SEPSIS: 10.8 MMOL/L (ref 0.4–1.9)
LACTIC ACID, SEPSIS: 5.4 MMOL/L (ref 0.4–1.9)
LACTIC ACID: 2.1 MMOL/L (ref 0.4–2)
LACTIC ACID: 2.3 MMOL/L (ref 0.4–2)
LACTIC ACID: 2.3 MMOL/L (ref 0.4–2)
LACTIC ACID: 3.6 MMOL/L (ref 0.4–2)
LACTIC ACID: 4.3 MMOL/L (ref 0.4–2)
LACTIC ACID: 6.4 MMOL/L (ref 0.4–2)
LACTIC ACID: 6.5 MMOL/L (ref 0.4–2)
LACTIC ACID: 9.2 MMOL/L (ref 0.4–2)
LEUKOCYTE ESTERASE, URINE: ABNORMAL
LYMPHOCYTES ABSOLUTE: 0.5 K/UL (ref 1–5.1)
LYMPHOCYTES ABSOLUTE: 1.3 K/UL (ref 1–5.1)
LYMPHOCYTES ABSOLUTE: 1.8 K/UL (ref 1–5.1)
LYMPHOCYTES ABSOLUTE: 1.9 K/UL (ref 1–5.1)
LYMPHOCYTES ABSOLUTE: 2.1 K/UL (ref 1–5.1)
LYMPHOCYTES ABSOLUTE: 2.1 K/UL (ref 1–5.1)
LYMPHOCYTES ABSOLUTE: 2.2 K/UL (ref 1–5.1)
LYMPHOCYTES ABSOLUTE: 2.5 K/UL (ref 1–5.1)
LYMPHOCYTES RELATIVE PERCENT: 11 %
LYMPHOCYTES RELATIVE PERCENT: 12 %
LYMPHOCYTES RELATIVE PERCENT: 2 %
LYMPHOCYTES RELATIVE PERCENT: 4 %
LYMPHOCYTES RELATIVE PERCENT: 6 %
LYMPHOCYTES RELATIVE PERCENT: 7 %
LYMPHOCYTES RELATIVE PERCENT: 8 %
LYMPHOCYTES RELATIVE PERCENT: 8 %
MACROCYTES: ABNORMAL
MAGNESIUM: 1.2 MG/DL (ref 1.8–2.4)
MAGNESIUM: 1.9 MG/DL (ref 1.8–2.4)
MAGNESIUM: 2 MG/DL (ref 1.8–2.4)
MAGNESIUM: 2.1 MG/DL (ref 1.8–2.4)
MAGNESIUM: 2.2 MG/DL (ref 1.8–2.4)
MAGNESIUM: 2.3 MG/DL (ref 1.8–2.4)
MAGNESIUM: 2.4 MG/DL (ref 1.8–2.4)
MAGNESIUM: 2.5 MG/DL (ref 1.8–2.4)
MAGNESIUM: 2.5 MG/DL (ref 1.8–2.4)
MCH RBC QN AUTO: 23.7 PG (ref 26–34)
MCH RBC QN AUTO: 23.8 PG (ref 26–34)
MCH RBC QN AUTO: 24.2 PG (ref 26–34)
MCH RBC QN AUTO: 24.3 PG (ref 26–34)
MCH RBC QN AUTO: 24.3 PG (ref 26–34)
MCH RBC QN AUTO: 24.5 PG (ref 26–34)
MCH RBC QN AUTO: 24.9 PG (ref 26–34)
MCH RBC QN AUTO: 25.5 PG (ref 26–34)
MCH RBC QN AUTO: 26.5 PG (ref 26–34)
MCHC RBC AUTO-ENTMCNC: 29.6 G/DL (ref 31–36)
MCHC RBC AUTO-ENTMCNC: 30 G/DL (ref 31–36)
MCHC RBC AUTO-ENTMCNC: 30.8 G/DL (ref 31–36)
MCHC RBC AUTO-ENTMCNC: 31.1 G/DL (ref 31–36)
MCHC RBC AUTO-ENTMCNC: 31.4 G/DL (ref 31–36)
MCHC RBC AUTO-ENTMCNC: 31.5 G/DL (ref 31–36)
MCHC RBC AUTO-ENTMCNC: 32 G/DL (ref 31–36)
MCHC RBC AUTO-ENTMCNC: 32.6 G/DL (ref 31–36)
MCHC RBC AUTO-ENTMCNC: 32.9 G/DL (ref 31–36)
MCV RBC AUTO: 75.6 FL (ref 80–100)
MCV RBC AUTO: 76.4 FL (ref 80–100)
MCV RBC AUTO: 76.6 FL (ref 80–100)
MCV RBC AUTO: 76.9 FL (ref 80–100)
MCV RBC AUTO: 77 FL (ref 80–100)
MCV RBC AUTO: 81.1 FL (ref 80–100)
MCV RBC AUTO: 81.2 FL (ref 80–100)
MCV RBC AUTO: 81.2 FL (ref 80–100)
MCV RBC AUTO: 82.4 FL (ref 80–100)
METAMYELOCYTES RELATIVE PERCENT: 1 %
METAMYELOCYTES RELATIVE PERCENT: 1 %
METAMYELOCYTES RELATIVE PERCENT: 2 %
METAMYELOCYTES RELATIVE PERCENT: 3 %
METHEMOGLOBIN ARTERIAL: 0.5 %
METHEMOGLOBIN ARTERIAL: 0.5 %
METHEMOGLOBIN ARTERIAL: 0.7 %
METHEMOGLOBIN ARTERIAL: 0.7 %
METHEMOGLOBIN ARTERIAL: 0.9 %
METHEMOGLOBIN ARTERIAL: 0.9 %
METHEMOGLOBIN VENOUS: 0.5 %
METHEMOGLOBIN VENOUS: 0.7 %
METHEMOGLOBIN VENOUS: 0.7 %
METHEMOGLOBIN VENOUS: 0.8 %
MICROCYTES: ABNORMAL
MICROSCOPIC EXAMINATION: YES
MISCELLANEOUS LAB TEST ORDER: NORMAL
MONOCYTES ABSOLUTE: 0 K/UL (ref 0–1.3)
MONOCYTES ABSOLUTE: 0 K/UL (ref 0–1.3)
MONOCYTES ABSOLUTE: 0.3 K/UL (ref 0–1.3)
MONOCYTES ABSOLUTE: 0.5 K/UL (ref 0–1.3)
MONOCYTES ABSOLUTE: 0.5 K/UL (ref 0–1.3)
MONOCYTES ABSOLUTE: 0.6 K/UL (ref 0–1.3)
MONOCYTES ABSOLUTE: 0.7 K/UL (ref 0–1.3)
MONOCYTES ABSOLUTE: 1 K/UL (ref 0–1.3)
MONOCYTES RELATIVE PERCENT: 0 %
MONOCYTES RELATIVE PERCENT: 0 %
MONOCYTES RELATIVE PERCENT: 1 %
MONOCYTES RELATIVE PERCENT: 2 %
MONOCYTES RELATIVE PERCENT: 3 %
MONOCYTES RELATIVE PERCENT: 4 %
NEUTROPHILS ABSOLUTE: 14.9 K/UL (ref 1.7–7.7)
NEUTROPHILS ABSOLUTE: 17.3 K/UL (ref 1.7–7.7)
NEUTROPHILS ABSOLUTE: 20.1 K/UL (ref 1.7–7.7)
NEUTROPHILS ABSOLUTE: 23.9 K/UL (ref 1.7–7.7)
NEUTROPHILS ABSOLUTE: 24.4 K/UL (ref 1.7–7.7)
NEUTROPHILS ABSOLUTE: 27.9 K/UL (ref 1.7–7.7)
NEUTROPHILS ABSOLUTE: 30.1 K/UL (ref 1.7–7.7)
NEUTROPHILS ABSOLUTE: 32 K/UL (ref 1.7–7.7)
NEUTROPHILS RELATIVE PERCENT: 25 %
NEUTROPHILS RELATIVE PERCENT: 27 %
NEUTROPHILS RELATIVE PERCENT: 38 %
NEUTROPHILS RELATIVE PERCENT: 63 %
NEUTROPHILS RELATIVE PERCENT: 64 %
NEUTROPHILS RELATIVE PERCENT: 71 %
NEUTROPHILS RELATIVE PERCENT: 73 %
NEUTROPHILS RELATIVE PERCENT: 78 %
NITRITE, URINE: NEGATIVE
O2 SAT, ARTERIAL: 96.5 %
O2 SAT, ARTERIAL: 98.3 %
O2 SAT, ARTERIAL: 98.4 %
O2 SAT, ARTERIAL: 98.5 %
O2 SAT, ARTERIAL: 99 % (ref 93–100)
O2 SAT, VEN: 79 %
O2 SAT, VEN: 94 %
O2 SAT, VEN: 96 %
O2 SAT, VEN: 97 %
O2 THERAPY: ABNORMAL
ORGANISM: ABNORMAL
OVALOCYTES: ABNORMAL
PCO2 ARTERIAL: 21.4 MMHG (ref 35–45)
PCO2 ARTERIAL: 22.6 MMHG (ref 35–45)
PCO2 ARTERIAL: 22.9 MMHG (ref 35–45)
PCO2 ARTERIAL: 30.2 MMHG (ref 35–45)
PCO2 ARTERIAL: 31 MMHG (ref 35–45)
PCO2 ARTERIAL: 31.1 MM HG (ref 35–45)
PCO2 ARTERIAL: 33.8 MMHG (ref 35–45)
PCO2, VEN: 26.5 MMHG (ref 40–50)
PCO2, VEN: 31.4 MMHG (ref 40–50)
PCO2, VEN: 36.7 MMHG (ref 40–50)
PCO2, VEN: 42.2 MMHG (ref 40–50)
PDW BLD-RTO: 14.8 % (ref 12.4–15.4)
PDW BLD-RTO: 15 % (ref 12.4–15.4)
PDW BLD-RTO: 15.1 % (ref 12.4–15.4)
PDW BLD-RTO: 15.3 % (ref 12.4–15.4)
PDW BLD-RTO: 15.3 % (ref 12.4–15.4)
PDW BLD-RTO: 15.7 % (ref 12.4–15.4)
PDW BLD-RTO: 15.8 % (ref 12.4–15.4)
PDW BLD-RTO: 17.6 % (ref 12.4–15.4)
PDW BLD-RTO: 17.7 % (ref 12.4–15.4)
PERFORMED ON: ABNORMAL
PERFORMED ON: NORMAL
PH ARTERIAL: 7.39 (ref 7.35–7.45)
PH ARTERIAL: 7.42 (ref 7.35–7.45)
PH ARTERIAL: 7.48 (ref 7.35–7.45)
PH ARTERIAL: 7.52 (ref 7.35–7.45)
PH ARTERIAL: 7.52 (ref 7.35–7.45)
PH ARTERIAL: 7.55 (ref 7.35–7.45)
PH ARTERIAL: 7.67 (ref 7.35–7.45)
PH UA: 6 (ref 5–8)
PH VENOUS: 7.36 (ref 7.35–7.45)
PH VENOUS: 7.4 (ref 7.35–7.45)
PH VENOUS: 7.4 (ref 7.35–7.45)
PH VENOUS: 7.41 (ref 7.35–7.45)
PH VENOUS: 7.42 (ref 7.35–7.45)
PH VENOUS: 7.42 (ref 7.35–7.45)
PH VENOUS: 7.44 (ref 7.35–7.45)
PH VENOUS: 7.45 (ref 7.35–7.45)
PH VENOUS: 7.46 (ref 7.35–7.45)
PH VENOUS: 7.48 (ref 7.35–7.45)
PH VENOUS: 7.49 (ref 7.35–7.45)
PH VENOUS: 7.51 (ref 7.35–7.45)
PH VENOUS: 7.52 (ref 7.35–7.45)
PH VENOUS: 7.54 (ref 7.35–7.45)
PH VENOUS: 7.55 (ref 7.35–7.45)
PHOSPHORUS: 1.3 MG/DL (ref 2.5–4.9)
PHOSPHORUS: 1.6 MG/DL (ref 2.5–4.9)
PHOSPHORUS: 1.9 MG/DL (ref 2.5–4.9)
PHOSPHORUS: 2.1 MG/DL (ref 2.5–4.9)
PHOSPHORUS: 2.3 MG/DL (ref 2.5–4.9)
PHOSPHORUS: 2.5 MG/DL (ref 2.5–4.9)
PHOSPHORUS: 2.8 MG/DL (ref 2.5–4.9)
PHOSPHORUS: 3 MG/DL (ref 2.5–4.9)
PHOSPHORUS: 3.1 MG/DL (ref 2.5–4.9)
PLATELET # BLD: 110 K/UL (ref 135–450)
PLATELET # BLD: 119 K/UL (ref 135–450)
PLATELET # BLD: 124 K/UL (ref 135–450)
PLATELET # BLD: 416 K/UL (ref 135–450)
PLATELET # BLD: 47 K/UL (ref 135–450)
PLATELET # BLD: 48 K/UL (ref 135–450)
PLATELET # BLD: 69 K/UL (ref 135–450)
PLATELET # BLD: 70 K/UL (ref 135–450)
PLATELET # BLD: 90 K/UL (ref 135–450)
PLATELET SLIDE REVIEW: ABNORMAL
PLATELET SLIDE REVIEW: ADEQUATE
PMV BLD AUTO: 10.3 FL (ref 5–10.5)
PMV BLD AUTO: 10.3 FL (ref 5–10.5)
PMV BLD AUTO: 11.1 FL (ref 5–10.5)
PMV BLD AUTO: 7.6 FL (ref 5–10.5)
PMV BLD AUTO: 7.6 FL (ref 5–10.5)
PMV BLD AUTO: 7.9 FL (ref 5–10.5)
PMV BLD AUTO: 8.6 FL (ref 5–10.5)
PMV BLD AUTO: 8.8 FL (ref 5–10.5)
PMV BLD AUTO: 9.1 FL (ref 5–10.5)
PO2 ARTERIAL: 133.5 MMHG (ref 75–108)
PO2 ARTERIAL: 133.6 MM HG (ref 75–108)
PO2 ARTERIAL: 133.7 MMHG (ref 75–108)
PO2 ARTERIAL: 144.7 MMHG (ref 75–108)
PO2 ARTERIAL: 161.7 MMHG (ref 75–108)
PO2 ARTERIAL: 181.4 MMHG (ref 75–108)
PO2 ARTERIAL: 90.4 MMHG (ref 75–108)
PO2, VEN: 45.4 MMHG (ref 25–40)
PO2, VEN: 70.3 MMHG (ref 25–40)
PO2, VEN: 87.7 MMHG (ref 25–40)
PO2, VEN: 94.6 MMHG (ref 25–40)
POC HEMATOCRIT: 21 % (ref 36–48)
POC POTASSIUM: 3.9 MMOL/L (ref 3.5–5.1)
POC SAMPLE TYPE: ABNORMAL
POC SODIUM: 138 MMOL/L (ref 136–145)
POIKILOCYTES: ABNORMAL
POLYCHROMASIA: ABNORMAL
POTASSIUM REFLEX MAGNESIUM: 3.2 MMOL/L (ref 3.5–5.1)
POTASSIUM REFLEX MAGNESIUM: 3.3 MMOL/L (ref 3.5–5.1)
POTASSIUM REFLEX MAGNESIUM: 3.3 MMOL/L (ref 3.5–5.1)
POTASSIUM REFLEX MAGNESIUM: 3.4 MMOL/L (ref 3.5–5.1)
POTASSIUM REFLEX MAGNESIUM: 3.5 MMOL/L (ref 3.5–5.1)
POTASSIUM REFLEX MAGNESIUM: 3.6 MMOL/L (ref 3.5–5.1)
POTASSIUM REFLEX MAGNESIUM: 3.8 MMOL/L (ref 3.5–5.1)
POTASSIUM REFLEX MAGNESIUM: 3.8 MMOL/L (ref 3.5–5.1)
POTASSIUM REFLEX MAGNESIUM: 3.9 MMOL/L (ref 3.5–5.1)
POTASSIUM REFLEX MAGNESIUM: 3.9 MMOL/L (ref 3.5–5.1)
POTASSIUM REFLEX MAGNESIUM: 4 MMOL/L (ref 3.5–5.1)
POTASSIUM REFLEX MAGNESIUM: 4 MMOL/L (ref 3.5–5.1)
POTASSIUM REFLEX MAGNESIUM: 4.2 MMOL/L (ref 3.5–5.1)
POTASSIUM SERPL-SCNC: 3.8 MMOL/L (ref 3.5–5.1)
PROCALCITONIN: 12.93 NG/ML (ref 0–0.15)
PROCALCITONIN: >100 NG/ML (ref 0–0.15)
PROCALCITONIN: >100 NG/ML (ref 0–0.15)
PROTEIN UA: 30 MG/DL
RBC # BLD: 2.44 M/UL (ref 4–5.2)
RBC # BLD: 2.54 M/UL (ref 4–5.2)
RBC # BLD: 2.91 M/UL (ref 4–5.2)
RBC # BLD: 2.93 M/UL (ref 4–5.2)
RBC # BLD: 2.97 M/UL (ref 4–5.2)
RBC # BLD: 3.02 M/UL (ref 4–5.2)
RBC # BLD: 3.32 M/UL (ref 4–5.2)
RBC # BLD: 3.48 M/UL (ref 4–5.2)
RBC # BLD: 3.84 M/UL (ref 4–5.2)
RBC UA: ABNORMAL /HPF (ref 0–4)
REPORT: NORMAL
SARS-COV-2 RNA, RT PCR: NOT DETECTED
SCHISTOCYTES: ABNORMAL
SLIDE REVIEW: ABNORMAL
SODIUM BLD-SCNC: 131 MMOL/L (ref 136–145)
SODIUM BLD-SCNC: 132 MMOL/L (ref 136–145)
SODIUM BLD-SCNC: 133 MMOL/L (ref 136–145)
SODIUM BLD-SCNC: 134 MMOL/L (ref 136–145)
SODIUM BLD-SCNC: 135 MMOL/L (ref 136–145)
SODIUM BLD-SCNC: 136 MMOL/L (ref 136–145)
SODIUM BLD-SCNC: 136 MMOL/L (ref 136–145)
SODIUM BLD-SCNC: 137 MMOL/L (ref 136–145)
SODIUM BLD-SCNC: 138 MMOL/L (ref 136–145)
SPECIFIC GRAVITY UA: >=1.03 (ref 1–1.03)
T3 TOTAL: 1.01 NG/ML (ref 0.8–2)
T4 TOTAL: 8.7 UG/DL (ref 4.5–10.9)
TCO2 ARTERIAL: 14.1 MMOL/L
TCO2 ARTERIAL: 14.2 MMOL/L
TCO2 ARTERIAL: 25.4 MMOL/L
TCO2 ARTERIAL: 25.9 MMOL/L
TCO2 ARTERIAL: 25.9 MMOL/L
TCO2 ARTERIAL: 26 MMOL/L
TCO2 ARTERIAL: 26.8 MMOL/L
TCO2 CALC VENOUS: 15 MMOL/L
TCO2 CALC VENOUS: 24 MMOL/L
TCO2 CALC VENOUS: 25 MMOL/L
TCO2 CALC VENOUS: 27 MMOL/L
TOTAL PROTEIN: 3.8 G/DL (ref 6.4–8.2)
TOTAL PROTEIN: 3.9 G/DL (ref 6.4–8.2)
TOTAL PROTEIN: 3.9 G/DL (ref 6.4–8.2)
TOTAL PROTEIN: 4 G/DL (ref 6.4–8.2)
TOTAL PROTEIN: 4 G/DL (ref 6.4–8.2)
TOTAL PROTEIN: 4.1 G/DL (ref 6.4–8.2)
TOTAL PROTEIN: 4.1 G/DL (ref 6.4–8.2)
TOTAL PROTEIN: 4.2 G/DL (ref 6.4–8.2)
TOTAL PROTEIN: 4.3 G/DL (ref 6.4–8.2)
TOTAL PROTEIN: 4.4 G/DL (ref 6.4–8.2)
TOTAL PROTEIN: 4.5 G/DL (ref 6.4–8.2)
TOTAL PROTEIN: 4.8 G/DL (ref 6.4–8.2)
TOTAL PROTEIN: 6.5 G/DL (ref 6.4–8.2)
TSH REFLEX FT4: 3.98 UIU/ML (ref 0.27–4.2)
URINE CULTURE, ROUTINE: ABNORMAL
URINE TYPE: ABNORMAL
UROBILINOGEN, URINE: 1 E.U./DL
VACUOLATED NEUTROPHILS: PRESENT
WBC # BLD: 17.9 K/UL (ref 4–11)
WBC # BLD: 19.9 K/UL (ref 4–11)
WBC # BLD: 22.6 K/UL (ref 4–11)
WBC # BLD: 24.4 K/UL (ref 4–11)
WBC # BLD: 27.4 K/UL (ref 4–11)
WBC # BLD: 31.3 K/UL (ref 4–11)
WBC # BLD: 32.4 K/UL (ref 4–11)
WBC # BLD: 34.4 K/UL (ref 4–11)
WBC # BLD: 5.3 K/UL (ref 4–11)
WBC UA: >100 /HPF (ref 0–5)

## 2022-01-01 PROCEDURE — 87150 DNA/RNA AMPLIFIED PROBE: CPT

## 2022-01-01 PROCEDURE — 6370000000 HC RX 637 (ALT 250 FOR IP): Performed by: STUDENT IN AN ORGANIZED HEALTH CARE EDUCATION/TRAINING PROGRAM

## 2022-01-01 PROCEDURE — 36415 COLL VENOUS BLD VENIPUNCTURE: CPT

## 2022-01-01 PROCEDURE — 82947 ASSAY GLUCOSE BLOOD QUANT: CPT

## 2022-01-01 PROCEDURE — 83605 ASSAY OF LACTIC ACID: CPT

## 2022-01-01 PROCEDURE — 2500000003 HC RX 250 WO HCPCS: Performed by: INTERNAL MEDICINE

## 2022-01-01 PROCEDURE — 84443 ASSAY THYROID STIM HORMONE: CPT

## 2022-01-01 PROCEDURE — 2500000003 HC RX 250 WO HCPCS: Performed by: STUDENT IN AN ORGANIZED HEALTH CARE EDUCATION/TRAINING PROGRAM

## 2022-01-01 PROCEDURE — 80053 COMPREHEN METABOLIC PANEL: CPT

## 2022-01-01 PROCEDURE — 84480 ASSAY TRIIODOTHYRONINE (T3): CPT

## 2022-01-01 PROCEDURE — 6370000000 HC RX 637 (ALT 250 FOR IP): Performed by: INTERNAL MEDICINE

## 2022-01-01 PROCEDURE — 99291 CRITICAL CARE FIRST HOUR: CPT | Performed by: INTERNAL MEDICINE

## 2022-01-01 PROCEDURE — 31622 DX BRONCHOSCOPE/WASH: CPT

## 2022-01-01 PROCEDURE — 93010 ELECTROCARDIOGRAM REPORT: CPT | Performed by: INTERNAL MEDICINE

## 2022-01-01 PROCEDURE — 81001 URINALYSIS AUTO W/SCOPE: CPT

## 2022-01-01 PROCEDURE — C9113 INJ PANTOPRAZOLE SODIUM, VIA: HCPCS | Performed by: INTERNAL MEDICINE

## 2022-01-01 PROCEDURE — 2580000003 HC RX 258: Performed by: INTERNAL MEDICINE

## 2022-01-01 PROCEDURE — 85014 HEMATOCRIT: CPT

## 2022-01-01 PROCEDURE — 82803 BLOOD GASES ANY COMBINATION: CPT

## 2022-01-01 PROCEDURE — 99291 CRITICAL CARE FIRST HOUR: CPT | Performed by: STUDENT IN AN ORGANIZED HEALTH CARE EDUCATION/TRAINING PROGRAM

## 2022-01-01 PROCEDURE — 0BC18ZZ EXTIRPATION OF MATTER FROM TRACHEA, VIA NATURAL OR ARTIFICIAL OPENING ENDOSCOPIC: ICD-10-PCS | Performed by: INTERNAL MEDICINE

## 2022-01-01 PROCEDURE — 84100 ASSAY OF PHOSPHORUS: CPT

## 2022-01-01 PROCEDURE — 0BH17EZ INSERTION OF ENDOTRACHEAL AIRWAY INTO TRACHEA, VIA NATURAL OR ARTIFICIAL OPENING: ICD-10-PCS | Performed by: INTERNAL MEDICINE

## 2022-01-01 PROCEDURE — 90945 DIALYSIS ONE EVALUATION: CPT

## 2022-01-01 PROCEDURE — 02HV33Z INSERTION OF INFUSION DEVICE INTO SUPERIOR VENA CAVA, PERCUTANEOUS APPROACH: ICD-10-PCS | Performed by: STUDENT IN AN ORGANIZED HEALTH CARE EDUCATION/TRAINING PROGRAM

## 2022-01-01 PROCEDURE — 84145 PROCALCITONIN (PCT): CPT

## 2022-01-01 PROCEDURE — 36592 COLLECT BLOOD FROM PICC: CPT

## 2022-01-01 PROCEDURE — 96374 THER/PROPH/DIAG INJ IV PUSH: CPT

## 2022-01-01 PROCEDURE — 99285 EMERGENCY DEPT VISIT HI MDM: CPT

## 2022-01-01 PROCEDURE — 82330 ASSAY OF CALCIUM: CPT

## 2022-01-01 PROCEDURE — 99231 SBSQ HOSP IP/OBS SF/LOW 25: CPT | Performed by: SURGERY

## 2022-01-01 PROCEDURE — 84132 ASSAY OF SERUM POTASSIUM: CPT

## 2022-01-01 PROCEDURE — 6360000002 HC RX W HCPCS: Performed by: EMERGENCY MEDICINE

## 2022-01-01 PROCEDURE — 94761 N-INVAS EAR/PLS OXIMETRY MLT: CPT

## 2022-01-01 PROCEDURE — 96365 THER/PROPH/DIAG IV INF INIT: CPT

## 2022-01-01 PROCEDURE — 85025 COMPLETE CBC W/AUTO DIFF WBC: CPT

## 2022-01-01 PROCEDURE — 83735 ASSAY OF MAGNESIUM: CPT

## 2022-01-01 PROCEDURE — 36556 INSERT NON-TUNNEL CV CATH: CPT | Performed by: INTERNAL MEDICINE

## 2022-01-01 PROCEDURE — 6360000002 HC RX W HCPCS: Performed by: INTERNAL MEDICINE

## 2022-01-01 PROCEDURE — 2580000003 HC RX 258: Performed by: STUDENT IN AN ORGANIZED HEALTH CARE EDUCATION/TRAINING PROGRAM

## 2022-01-01 PROCEDURE — 2700000000 HC OXYGEN THERAPY PER DAY

## 2022-01-01 PROCEDURE — 96361 HYDRATE IV INFUSION ADD-ON: CPT

## 2022-01-01 PROCEDURE — 71045 X-RAY EXAM CHEST 1 VIEW: CPT

## 2022-01-01 PROCEDURE — 86923 COMPATIBILITY TEST ELECTRIC: CPT

## 2022-01-01 PROCEDURE — 99221 1ST HOSP IP/OBS SF/LOW 40: CPT | Performed by: SURGERY

## 2022-01-01 PROCEDURE — 36556 INSERT NON-TUNNEL CV CATH: CPT

## 2022-01-01 PROCEDURE — 94003 VENT MGMT INPAT SUBQ DAY: CPT

## 2022-01-01 PROCEDURE — 6360000002 HC RX W HCPCS: Performed by: STUDENT IN AN ORGANIZED HEALTH CARE EDUCATION/TRAINING PROGRAM

## 2022-01-01 PROCEDURE — 87205 SMEAR GRAM STAIN: CPT

## 2022-01-01 PROCEDURE — P9045 ALBUMIN (HUMAN), 5%, 250 ML: HCPCS | Performed by: STUDENT IN AN ORGANIZED HEALTH CARE EDUCATION/TRAINING PROGRAM

## 2022-01-01 PROCEDURE — 84436 ASSAY OF TOTAL THYROXINE: CPT

## 2022-01-01 PROCEDURE — 87040 BLOOD CULTURE FOR BACTERIA: CPT

## 2022-01-01 PROCEDURE — 36556 INSERT NON-TUNNEL CV CATH: CPT | Performed by: STUDENT IN AN ORGANIZED HEALTH CARE EDUCATION/TRAINING PROGRAM

## 2022-01-01 PROCEDURE — 31645 BRNCHSC W/THER ASPIR 1ST: CPT | Performed by: INTERNAL MEDICINE

## 2022-01-01 PROCEDURE — P9016 RBC LEUKOCYTES REDUCED: HCPCS

## 2022-01-01 PROCEDURE — 2000000000 HC ICU R&B

## 2022-01-01 PROCEDURE — 6370000000 HC RX 637 (ALT 250 FOR IP): Performed by: EMERGENCY MEDICINE

## 2022-01-01 PROCEDURE — 74018 RADEX ABDOMEN 1 VIEW: CPT

## 2022-01-01 PROCEDURE — 93005 ELECTROCARDIOGRAM TRACING: CPT | Performed by: EMERGENCY MEDICINE

## 2022-01-01 PROCEDURE — 93005 ELECTROCARDIOGRAM TRACING: CPT | Performed by: INTERNAL MEDICINE

## 2022-01-01 PROCEDURE — P9047 ALBUMIN (HUMAN), 25%, 50ML: HCPCS | Performed by: INTERNAL MEDICINE

## 2022-01-01 PROCEDURE — 87636 SARSCOV2 & INF A&B AMP PRB: CPT

## 2022-01-01 PROCEDURE — 85018 HEMOGLOBIN: CPT

## 2022-01-01 PROCEDURE — 74176 CT ABD & PELVIS W/O CONTRAST: CPT

## 2022-01-01 PROCEDURE — 31500 INSERT EMERGENCY AIRWAY: CPT

## 2022-01-01 PROCEDURE — 86022 PLATELET ANTIBODIES: CPT

## 2022-01-01 PROCEDURE — 2580000003 HC RX 258: Performed by: EMERGENCY MEDICINE

## 2022-01-01 PROCEDURE — 84295 ASSAY OF SERUM SODIUM: CPT

## 2022-01-01 PROCEDURE — 31500 INSERT EMERGENCY AIRWAY: CPT | Performed by: INTERNAL MEDICINE

## 2022-01-01 PROCEDURE — 87186 SC STD MICRODIL/AGAR DIL: CPT

## 2022-01-01 PROCEDURE — 94002 VENT MGMT INPAT INIT DAY: CPT

## 2022-01-01 PROCEDURE — 36430 TRANSFUSION BLD/BLD COMPNT: CPT

## 2022-01-01 PROCEDURE — 87070 CULTURE OTHR SPECIMN AEROBIC: CPT

## 2022-01-01 PROCEDURE — 5A1955Z RESPIRATORY VENTILATION, GREATER THAN 96 CONSECUTIVE HOURS: ICD-10-PCS | Performed by: INTERNAL MEDICINE

## 2022-01-01 PROCEDURE — 86850 RBC ANTIBODY SCREEN: CPT

## 2022-01-01 PROCEDURE — 87086 URINE CULTURE/COLONY COUNT: CPT

## 2022-01-01 PROCEDURE — 85027 COMPLETE CBC AUTOMATED: CPT

## 2022-01-01 PROCEDURE — 86901 BLOOD TYPING SEROLOGIC RH(D): CPT

## 2022-01-01 PROCEDURE — 02HV33Z INSERTION OF INFUSION DEVICE INTO SUPERIOR VENA CAVA, PERCUTANEOUS APPROACH: ICD-10-PCS | Performed by: INTERNAL MEDICINE

## 2022-01-01 PROCEDURE — 2580000003 HC RX 258

## 2022-01-01 PROCEDURE — 93005 ELECTROCARDIOGRAM TRACING: CPT | Performed by: STUDENT IN AN ORGANIZED HEALTH CARE EDUCATION/TRAINING PROGRAM

## 2022-01-01 PROCEDURE — 86900 BLOOD TYPING SEROLOGIC ABO: CPT

## 2022-01-01 RX ORDER — 0.9 % SODIUM CHLORIDE 0.9 %
30 INTRAVENOUS SOLUTION INTRAVENOUS ONCE
Status: COMPLETED | OUTPATIENT
Start: 2022-01-01 | End: 2022-01-01

## 2022-01-01 RX ORDER — MIDAZOLAM HYDROCHLORIDE 1 MG/ML
1 INJECTION INTRAMUSCULAR; INTRAVENOUS
Status: DISCONTINUED | OUTPATIENT
Start: 2022-01-01 | End: 2022-01-01 | Stop reason: HOSPADM

## 2022-01-01 RX ORDER — LORAZEPAM 2 MG/ML
2 INJECTION INTRAMUSCULAR
Status: DISCONTINUED | OUTPATIENT
Start: 2022-01-01 | End: 2022-01-01 | Stop reason: HOSPADM

## 2022-01-01 RX ORDER — DEXMEDETOMIDINE HYDROCHLORIDE 4 UG/ML
.1-1.5 INJECTION, SOLUTION INTRAVENOUS CONTINUOUS
Status: DISCONTINUED | OUTPATIENT
Start: 2022-01-01 | End: 2022-01-01

## 2022-01-01 RX ORDER — DEXTROSE MONOHYDRATE 100 MG/ML
INJECTION, SOLUTION INTRAVENOUS CONTINUOUS
Status: DISCONTINUED | OUTPATIENT
Start: 2022-01-01 | End: 2022-01-01 | Stop reason: HOSPADM

## 2022-01-01 RX ORDER — ACETAMINOPHEN 325 MG/1
650 TABLET ORAL ONCE
Status: COMPLETED | OUTPATIENT
Start: 2022-01-01 | End: 2022-01-01

## 2022-01-01 RX ORDER — DEXTROSE MONOHYDRATE 25 G/50ML
25 INJECTION, SOLUTION INTRAVENOUS ONCE
Status: COMPLETED | OUTPATIENT
Start: 2022-01-01 | End: 2022-01-01

## 2022-01-01 RX ORDER — SODIUM CHLORIDE 9 MG/ML
INJECTION, SOLUTION INTRAVENOUS CONTINUOUS
Status: DISCONTINUED | OUTPATIENT
Start: 2022-01-01 | End: 2022-01-01

## 2022-01-01 RX ORDER — DEXTROSE MONOHYDRATE 100 MG/ML
INJECTION, SOLUTION INTRAVENOUS CONTINUOUS
Status: DISCONTINUED | OUTPATIENT
Start: 2022-01-01 | End: 2022-01-01 | Stop reason: ALTCHOICE

## 2022-01-01 RX ORDER — ALBUMIN, HUMAN INJ 5% 5 %
25 SOLUTION INTRAVENOUS ONCE
Status: COMPLETED | OUTPATIENT
Start: 2022-01-01 | End: 2022-01-01

## 2022-01-01 RX ORDER — 0.9 % SODIUM CHLORIDE 0.9 %
1000 INTRAVENOUS SOLUTION INTRAVENOUS ONCE
Status: COMPLETED | OUTPATIENT
Start: 2022-01-01 | End: 2022-01-01

## 2022-01-01 RX ORDER — SODIUM CHLORIDE 9 MG/ML
INJECTION, SOLUTION INTRAVENOUS PRN
Status: DISCONTINUED | OUTPATIENT
Start: 2022-01-01 | End: 2022-01-01 | Stop reason: HOSPADM

## 2022-01-01 RX ORDER — DEXTROSE MONOHYDRATE 25 G/50ML
50 INJECTION, SOLUTION INTRAVENOUS PRN
Status: DISCONTINUED | OUTPATIENT
Start: 2022-01-01 | End: 2022-01-01

## 2022-01-01 RX ORDER — LIDOCAINE HYDROCHLORIDE 10 MG/ML
5 INJECTION, SOLUTION INFILTRATION; PERINEURAL ONCE
Status: DISCONTINUED | OUTPATIENT
Start: 2022-01-01 | End: 2022-01-01

## 2022-01-01 RX ORDER — MIDODRINE HYDROCHLORIDE 5 MG/1
5 TABLET ORAL
Status: DISCONTINUED | OUTPATIENT
Start: 2022-01-01 | End: 2022-01-01 | Stop reason: HOSPADM

## 2022-01-01 RX ORDER — MORPHINE SULFATE 2 MG/ML
2 INJECTION, SOLUTION INTRAMUSCULAR; INTRAVENOUS
Status: DISCONTINUED | OUTPATIENT
Start: 2022-01-01 | End: 2022-01-01 | Stop reason: HOSPADM

## 2022-01-01 RX ORDER — CALCIUM GLUCONATE 20 MG/ML
1000 INJECTION, SOLUTION INTRAVENOUS PRN
Status: DISCONTINUED | OUTPATIENT
Start: 2022-01-01 | End: 2022-01-01 | Stop reason: HOSPADM

## 2022-01-01 RX ORDER — SODIUM CHLORIDE 0.9 % (FLUSH) 0.9 %
5-40 SYRINGE (ML) INJECTION PRN
Status: DISCONTINUED | OUTPATIENT
Start: 2022-01-01 | End: 2022-01-01 | Stop reason: SDUPTHER

## 2022-01-01 RX ORDER — MAGNESIUM SULFATE 1 G/100ML
1000 INJECTION INTRAVENOUS PRN
Status: DISCONTINUED | OUTPATIENT
Start: 2022-01-01 | End: 2022-01-01 | Stop reason: HOSPADM

## 2022-01-01 RX ORDER — NALOXONE HYDROCHLORIDE 1 MG/ML
INJECTION INTRAMUSCULAR; INTRAVENOUS; SUBCUTANEOUS DAILY PRN
Status: COMPLETED | OUTPATIENT
Start: 2022-01-01 | End: 2022-01-01

## 2022-01-01 RX ORDER — LORAZEPAM 2 MG/ML
2 INJECTION INTRAMUSCULAR
Status: DISCONTINUED | OUTPATIENT
Start: 2022-01-01 | End: 2022-01-01

## 2022-01-01 RX ORDER — MAGNESIUM SULFATE IN WATER 40 MG/ML
2000 INJECTION, SOLUTION INTRAVENOUS PRN
Status: DISCONTINUED | OUTPATIENT
Start: 2022-01-01 | End: 2022-01-01

## 2022-01-01 RX ORDER — METHIMAZOLE 5 MG/1
5 TABLET ORAL DAILY
Status: DISCONTINUED | OUTPATIENT
Start: 2022-01-01 | End: 2022-01-01 | Stop reason: HOSPADM

## 2022-01-01 RX ORDER — CARBOXYMETHYLCELLULOSE SODIUM 10 MG/ML
1 GEL OPHTHALMIC
Status: DISCONTINUED | OUTPATIENT
Start: 2022-01-01 | End: 2022-01-01 | Stop reason: HOSPADM

## 2022-01-01 RX ORDER — SENNA PLUS 8.6 MG/1
1 TABLET ORAL 2 TIMES DAILY
Status: DISCONTINUED | OUTPATIENT
Start: 2022-01-01 | End: 2022-01-01 | Stop reason: HOSPADM

## 2022-01-01 RX ORDER — METRONIDAZOLE 500 MG/100ML
500 INJECTION, SOLUTION INTRAVENOUS EVERY 8 HOURS
Status: DISCONTINUED | OUTPATIENT
Start: 2022-01-01 | End: 2022-01-01

## 2022-01-01 RX ORDER — PROPOFOL 10 MG/ML
INJECTION, EMULSION INTRAVENOUS
Status: DISPENSED
Start: 2022-01-01 | End: 2022-01-01

## 2022-01-01 RX ORDER — FENTANYL CITRATE-0.9 % NACL/PF 10 MCG/ML
25-200 PLASTIC BAG, INJECTION (ML) INTRAVENOUS CONTINUOUS
Status: DISCONTINUED | OUTPATIENT
Start: 2022-01-01 | End: 2022-01-01

## 2022-01-01 RX ORDER — ACETAMINOPHEN 325 MG/1
650 TABLET ORAL EVERY 6 HOURS PRN
Status: DISCONTINUED | OUTPATIENT
Start: 2022-01-01 | End: 2022-01-01 | Stop reason: HOSPADM

## 2022-01-01 RX ORDER — SENNA PLUS 8.6 MG/1
1 TABLET ORAL DAILY
Status: DISCONTINUED | OUTPATIENT
Start: 2022-01-01 | End: 2022-01-01

## 2022-01-01 RX ORDER — DEXTROSE MONOHYDRATE 100 MG/ML
INJECTION, SOLUTION INTRAVENOUS
Status: COMPLETED
Start: 2022-01-01 | End: 2022-01-01

## 2022-01-01 RX ORDER — SODIUM CHLORIDE 9 MG/ML
25 INJECTION, SOLUTION INTRAVENOUS PRN
Status: DISCONTINUED | OUTPATIENT
Start: 2022-01-01 | End: 2022-01-01 | Stop reason: HOSPADM

## 2022-01-01 RX ORDER — HEPARIN SODIUM 5000 [USP'U]/ML
5000 INJECTION, SOLUTION INTRAVENOUS; SUBCUTANEOUS 2 TIMES DAILY
Status: DISCONTINUED | OUTPATIENT
Start: 2022-01-01 | End: 2022-01-01

## 2022-01-01 RX ORDER — ATROPINE SULFATE 10 MG/ML
1 SOLUTION/ DROPS OPHTHALMIC
Status: DISCONTINUED | OUTPATIENT
Start: 2022-01-01 | End: 2022-01-01 | Stop reason: HOSPADM

## 2022-01-01 RX ORDER — SODIUM CHLORIDE 0.9 % (FLUSH) 0.9 %
5-40 SYRINGE (ML) INJECTION EVERY 12 HOURS SCHEDULED
Status: DISCONTINUED | OUTPATIENT
Start: 2022-01-01 | End: 2022-01-01 | Stop reason: HOSPADM

## 2022-01-01 RX ORDER — SODIUM CHLORIDE 0.9 % (FLUSH) 0.9 %
5-40 SYRINGE (ML) INJECTION EVERY 12 HOURS SCHEDULED
Status: DISCONTINUED | OUTPATIENT
Start: 2022-01-01 | End: 2022-01-01 | Stop reason: SDUPTHER

## 2022-01-01 RX ORDER — ACETAMINOPHEN 650 MG/1
650 SUPPOSITORY RECTAL EVERY 6 HOURS PRN
Status: DISCONTINUED | OUTPATIENT
Start: 2022-01-01 | End: 2022-01-01 | Stop reason: HOSPADM

## 2022-01-01 RX ORDER — FENTANYL CITRATE-0.9 % NACL/PF 10 MCG/ML
25-200 PLASTIC BAG, INJECTION (ML) INTRAVENOUS CONTINUOUS
Status: DISCONTINUED | OUTPATIENT
Start: 2022-01-01 | End: 2022-01-01 | Stop reason: HOSPADM

## 2022-01-01 RX ORDER — SODIUM CHLORIDE 0.9 % (FLUSH) 0.9 %
5-40 SYRINGE (ML) INJECTION PRN
Status: DISCONTINUED | OUTPATIENT
Start: 2022-01-01 | End: 2022-01-01 | Stop reason: HOSPADM

## 2022-01-01 RX ORDER — CHLORHEXIDINE GLUCONATE 0.12 MG/ML
15 RINSE ORAL 2 TIMES DAILY
Status: DISCONTINUED | OUTPATIENT
Start: 2022-01-01 | End: 2022-01-01 | Stop reason: HOSPADM

## 2022-01-01 RX ORDER — PANTOPRAZOLE SODIUM 40 MG/10ML
40 INJECTION, POWDER, LYOPHILIZED, FOR SOLUTION INTRAVENOUS DAILY
Status: DISCONTINUED | OUTPATIENT
Start: 2022-01-01 | End: 2022-01-01 | Stop reason: HOSPADM

## 2022-01-01 RX ORDER — DEXTROSE MONOHYDRATE 100 MG/ML
INJECTION, SOLUTION INTRAVENOUS CONTINUOUS PRN
Status: DISCONTINUED | OUTPATIENT
Start: 2022-01-01 | End: 2022-01-01 | Stop reason: HOSPADM

## 2022-01-01 RX ORDER — ENOXAPARIN SODIUM 100 MG/ML
30 INJECTION SUBCUTANEOUS DAILY
Status: DISCONTINUED | OUTPATIENT
Start: 2022-01-01 | End: 2022-01-01

## 2022-01-01 RX ORDER — ALBUMIN (HUMAN) 12.5 G/50ML
25 SOLUTION INTRAVENOUS ONCE
Status: COMPLETED | OUTPATIENT
Start: 2022-01-01 | End: 2022-01-01

## 2022-01-01 RX ORDER — SODIUM CHLORIDE, SODIUM GLUCONATE, SODIUM ACETATE, POTASSIUM CHLORIDE AND MAGNESIUM CHLORIDE 526; 502; 368; 37; 30 MG/100ML; MG/100ML; MG/100ML; MG/100ML; MG/100ML
INJECTION, SOLUTION INTRAVENOUS CONTINUOUS
Status: DISCONTINUED | OUTPATIENT
Start: 2022-01-01 | End: 2022-01-01

## 2022-01-01 RX ORDER — POTASSIUM CHLORIDE 29.8 MG/ML
20 INJECTION INTRAVENOUS PRN
Status: DISCONTINUED | OUTPATIENT
Start: 2022-01-01 | End: 2022-01-01 | Stop reason: HOSPADM

## 2022-01-01 RX ORDER — IBUPROFEN 400 MG/1
400 TABLET ORAL ONCE
Status: COMPLETED | OUTPATIENT
Start: 2022-01-01 | End: 2022-01-01

## 2022-01-01 RX ORDER — POTASSIUM CHLORIDE 29.8 MG/ML
20 INJECTION INTRAVENOUS PRN
Status: DISCONTINUED | OUTPATIENT
Start: 2022-01-01 | End: 2022-01-01

## 2022-01-01 RX ORDER — MINERAL OIL 100 G/100G
1 OIL RECTAL ONCE
Status: COMPLETED | OUTPATIENT
Start: 2022-01-01 | End: 2022-01-01

## 2022-01-01 RX ADMIN — METRONIDAZOLE 500 MG: 500 INJECTION, SOLUTION INTRAVENOUS at 08:43

## 2022-01-01 RX ADMIN — MIDODRINE HYDROCHLORIDE 5 MG: 5 TABLET ORAL at 12:30

## 2022-01-01 RX ADMIN — NOREPINEPHRINE BITARTRATE 6 MCG/MIN: 1 SOLUTION INTRAVENOUS at 14:42

## 2022-01-01 RX ADMIN — MUPIROCIN: 20 OINTMENT TOPICAL at 08:31

## 2022-01-01 RX ADMIN — CEFTRIAXONE 2000 MG: 2 INJECTION, POWDER, FOR SOLUTION INTRAMUSCULAR; INTRAVENOUS at 10:31

## 2022-01-01 RX ADMIN — CALCIUM GLUCONATE 1000 MG: 20 INJECTION, SOLUTION INTRAVENOUS at 01:29

## 2022-01-01 RX ADMIN — Medication 75 MCG/HR: at 12:16

## 2022-01-01 RX ADMIN — MUPIROCIN: 20 OINTMENT TOPICAL at 20:15

## 2022-01-01 RX ADMIN — Medication 15 ML: at 12:23

## 2022-01-01 RX ADMIN — NALOXONE HYDROCHLORIDE 2 MG: 1 INJECTION PARENTERAL at 14:44

## 2022-01-01 RX ADMIN — SODIUM PHOSPHATE, MONOBASIC, MONOHYDRATE 6 MMOL: 276; 142 INJECTION, SOLUTION INTRAVENOUS at 07:16

## 2022-01-01 RX ADMIN — SODIUM CHLORIDE, PRESERVATIVE FREE 10 ML: 5 INJECTION INTRAVENOUS at 20:15

## 2022-01-01 RX ADMIN — MEROPENEM 1000 MG: 1 INJECTION, POWDER, FOR SOLUTION INTRAVENOUS at 08:21

## 2022-01-01 RX ADMIN — HEPARIN SODIUM 5000 UNITS: 5000 INJECTION INTRAVENOUS; SUBCUTANEOUS at 21:20

## 2022-01-01 RX ADMIN — CARBOXYMETHYLCELLULOSE SODIUM 1 DROP: 10 GEL OPHTHALMIC at 08:29

## 2022-01-01 RX ADMIN — SENNOSIDES 8.6 MG: 8.6 TABLET, COATED ORAL at 08:23

## 2022-01-01 RX ADMIN — MUPIROCIN: 20 OINTMENT TOPICAL at 21:20

## 2022-01-01 RX ADMIN — Medication 175 MCG/HR: at 05:28

## 2022-01-01 RX ADMIN — Medication 15 ML: at 20:45

## 2022-01-01 RX ADMIN — Medication 100 MCG/HR: at 01:15

## 2022-01-01 RX ADMIN — SODIUM CHLORIDE, PRESERVATIVE FREE 10 ML: 5 INJECTION INTRAVENOUS at 21:03

## 2022-01-01 RX ADMIN — CARBOXYMETHYLCELLULOSE SODIUM 1 DROP: 10 GEL OPHTHALMIC at 23:45

## 2022-01-01 RX ADMIN — CARBOXYMETHYLCELLULOSE SODIUM 1 DROP: 10 GEL OPHTHALMIC at 18:25

## 2022-01-01 RX ADMIN — DOCUSATE SODIUM LIQUID 100 MG: 100 LIQUID ORAL at 09:10

## 2022-01-01 RX ADMIN — Medication 0.8 MCG/KG/HR: at 12:52

## 2022-01-01 RX ADMIN — METHIMAZOLE 5 MG: 5 TABLET ORAL at 11:04

## 2022-01-01 RX ADMIN — CARBOXYMETHYLCELLULOSE SODIUM 1 DROP: 10 GEL OPHTHALMIC at 12:30

## 2022-01-01 RX ADMIN — Medication: at 05:02

## 2022-01-01 RX ADMIN — CALCIUM GLUCONATE 1000 MG: 20 INJECTION, SOLUTION INTRAVENOUS at 07:35

## 2022-01-01 RX ADMIN — Medication: at 20:39

## 2022-01-01 RX ADMIN — Medication 0.8 MCG/KG/HR: at 23:32

## 2022-01-01 RX ADMIN — MIDODRINE HYDROCHLORIDE 5 MG: 5 TABLET ORAL at 20:54

## 2022-01-01 RX ADMIN — CALCIUM GLUCONATE 1000 MG: 20 INJECTION, SOLUTION INTRAVENOUS at 22:32

## 2022-01-01 RX ADMIN — PANTOPRAZOLE SODIUM 40 MG: 40 INJECTION, POWDER, FOR SOLUTION INTRAVENOUS at 08:22

## 2022-01-01 RX ADMIN — Medication: at 16:19

## 2022-01-01 RX ADMIN — CARBOXYMETHYLCELLULOSE SODIUM 1 DROP: 10 GEL OPHTHALMIC at 08:22

## 2022-01-01 RX ADMIN — DEXTROSE MONOHYDRATE 50 G: 25 INJECTION, SOLUTION INTRAVENOUS at 14:52

## 2022-01-01 RX ADMIN — SODIUM CHLORIDE, PRESERVATIVE FREE 10 ML: 5 INJECTION INTRAVENOUS at 21:20

## 2022-01-01 RX ADMIN — CARBOXYMETHYLCELLULOSE SODIUM 1 DROP: 10 GEL OPHTHALMIC at 05:04

## 2022-01-01 RX ADMIN — MUPIROCIN: 20 OINTMENT TOPICAL at 08:44

## 2022-01-01 RX ADMIN — SODIUM CHLORIDE, SODIUM GLUCONATE, SODIUM ACETATE, POTASSIUM CHLORIDE AND MAGNESIUM CHLORIDE: 526; 502; 368; 37; 30 INJECTION, SOLUTION INTRAVENOUS at 01:18

## 2022-01-01 RX ADMIN — COLLAGENASE SANTYL: 250 OINTMENT TOPICAL at 08:28

## 2022-01-01 RX ADMIN — DOCUSATE SODIUM LIQUID 100 MG: 100 LIQUID ORAL at 09:54

## 2022-01-01 RX ADMIN — CALCIUM GLUCONATE 1000 MG: 20 INJECTION, SOLUTION INTRAVENOUS at 04:40

## 2022-01-01 RX ADMIN — COLLAGENASE SANTYL: 250 OINTMENT TOPICAL at 11:38

## 2022-01-01 RX ADMIN — SODIUM BICARBONATE: 84 INJECTION, SOLUTION INTRAVENOUS at 06:19

## 2022-01-01 RX ADMIN — DOCUSATE SODIUM LIQUID 100 MG: 100 LIQUID ORAL at 00:30

## 2022-01-01 RX ADMIN — Medication: at 12:30

## 2022-01-01 RX ADMIN — CARBOXYMETHYLCELLULOSE SODIUM 1 DROP: 10 GEL OPHTHALMIC at 22:33

## 2022-01-01 RX ADMIN — SODIUM CHLORIDE 1000 ML: 9 INJECTION, SOLUTION INTRAVENOUS at 17:00

## 2022-01-01 RX ADMIN — DEXTROSE MONOHYDRATE: 100 INJECTION, SOLUTION INTRAVENOUS at 12:46

## 2022-01-01 RX ADMIN — PANTOPRAZOLE SODIUM 40 MG: 40 INJECTION, POWDER, FOR SOLUTION INTRAVENOUS at 08:44

## 2022-01-01 RX ADMIN — POTASSIUM CHLORIDE 20 MEQ: 29.8 INJECTION, SOLUTION INTRAVENOUS at 07:52

## 2022-01-01 RX ADMIN — Medication 150 MCG/HR: at 09:03

## 2022-01-01 RX ADMIN — SODIUM CHLORIDE, SODIUM GLUCONATE, SODIUM ACETATE, POTASSIUM CHLORIDE AND MAGNESIUM CHLORIDE: 526; 502; 368; 37; 30 INJECTION, SOLUTION INTRAVENOUS at 18:00

## 2022-01-01 RX ADMIN — METHIMAZOLE 5 MG: 5 TABLET ORAL at 09:11

## 2022-01-01 RX ADMIN — Medication 150 MCG/HR: at 14:26

## 2022-01-01 RX ADMIN — CARBOXYMETHYLCELLULOSE SODIUM 1 DROP: 10 GEL OPHTHALMIC at 00:43

## 2022-01-01 RX ADMIN — METHIMAZOLE 5 MG: 5 TABLET ORAL at 08:29

## 2022-01-01 RX ADMIN — Medication: at 15:18

## 2022-01-01 RX ADMIN — CALCIUM GLUCONATE 1000 MG: 20 INJECTION, SOLUTION INTRAVENOUS at 06:04

## 2022-01-01 RX ADMIN — NOREPINEPHRINE BITARTRATE 30 MCG/MIN: 1 SOLUTION INTRAVENOUS at 07:51

## 2022-01-01 RX ADMIN — DEXTROSE MONOHYDRATE: 100 INJECTION, SOLUTION INTRAVENOUS at 09:26

## 2022-01-01 RX ADMIN — METHIMAZOLE 5 MG: 5 TABLET ORAL at 21:15

## 2022-01-01 RX ADMIN — CALCIUM GLUCONATE 1000 MG: 20 INJECTION, SOLUTION INTRAVENOUS at 18:20

## 2022-01-01 RX ADMIN — CALCIUM GLUCONATE 1000 MG: 98 INJECTION, SOLUTION INTRAVENOUS at 10:58

## 2022-01-01 RX ADMIN — MUPIROCIN: 20 OINTMENT TOPICAL at 09:32

## 2022-01-01 RX ADMIN — DOCUSATE SODIUM LIQUID 100 MG: 100 LIQUID ORAL at 08:27

## 2022-01-01 RX ADMIN — COLLAGENASE SANTYL: 250 OINTMENT TOPICAL at 01:36

## 2022-01-01 RX ADMIN — CEFTRIAXONE SODIUM 1000 MG: 1 INJECTION, POWDER, FOR SOLUTION INTRAMUSCULAR; INTRAVENOUS at 16:00

## 2022-01-01 RX ADMIN — MEROPENEM 1000 MG: 1 INJECTION, POWDER, FOR SOLUTION INTRAVENOUS at 12:40

## 2022-01-01 RX ADMIN — SENNOSIDES 8.6 MG: 8.6 TABLET, COATED ORAL at 20:53

## 2022-01-01 RX ADMIN — Medication 150 MCG/HR: at 00:17

## 2022-01-01 RX ADMIN — Medication 1 MCG/KG/HR: at 19:23

## 2022-01-01 RX ADMIN — COLLAGENASE SANTYL: 250 OINTMENT TOPICAL at 08:57

## 2022-01-01 RX ADMIN — COLLAGENASE SANTYL: 250 OINTMENT TOPICAL at 08:19

## 2022-01-01 RX ADMIN — Medication 175 MCG/HR: at 22:07

## 2022-01-01 RX ADMIN — SODIUM PHOSPHATE, MONOBASIC, MONOHYDRATE 6 MMOL: 276; 142 INJECTION, SOLUTION INTRAVENOUS at 02:51

## 2022-01-01 RX ADMIN — MEROPENEM 1000 MG: 1 INJECTION, POWDER, FOR SOLUTION INTRAVENOUS at 20:32

## 2022-01-01 RX ADMIN — Medication 15 ML: at 22:33

## 2022-01-01 RX ADMIN — Medication: at 00:30

## 2022-01-01 RX ADMIN — LORAZEPAM 2 MG: 2 INJECTION INTRAMUSCULAR at 17:03

## 2022-01-01 RX ADMIN — CALCIUM GLUCONATE 1000 MG: 20 INJECTION, SOLUTION INTRAVENOUS at 09:58

## 2022-01-01 RX ADMIN — Medication 15 ML: at 08:20

## 2022-01-01 RX ADMIN — CARBOXYMETHYLCELLULOSE SODIUM 1 DROP: 10 GEL OPHTHALMIC at 18:32

## 2022-01-01 RX ADMIN — SODIUM PHOSPHATE, MONOBASIC, MONOHYDRATE 6 MMOL: 276; 142 INJECTION, SOLUTION INTRAVENOUS at 09:05

## 2022-01-01 RX ADMIN — CARBOXYMETHYLCELLULOSE SODIUM 1 DROP: 10 GEL OPHTHALMIC at 08:20

## 2022-01-01 RX ADMIN — SODIUM CHLORIDE 1000 ML: 9 INJECTION, SOLUTION INTRAVENOUS at 05:59

## 2022-01-01 RX ADMIN — Medication 175 MCG/HR: at 12:01

## 2022-01-01 RX ADMIN — NOREPINEPHRINE BITARTRATE 30 MCG/MIN: 1 SOLUTION INTRAVENOUS at 03:09

## 2022-01-01 RX ADMIN — Medication 10 ML: at 21:02

## 2022-01-01 RX ADMIN — Medication 75 MCG/HR: at 13:22

## 2022-01-01 RX ADMIN — MINERAL OIL 330 ML: 1000 LIQUID ORAL at 17:15

## 2022-01-01 RX ADMIN — MUPIROCIN: 20 OINTMENT TOPICAL at 08:57

## 2022-01-01 RX ADMIN — CARBOXYMETHYLCELLULOSE SODIUM 1 DROP: 10 GEL OPHTHALMIC at 12:00

## 2022-01-01 RX ADMIN — HEPARIN SODIUM 5000 UNITS: 5000 INJECTION INTRAVENOUS; SUBCUTANEOUS at 08:22

## 2022-01-01 RX ADMIN — CARBOXYMETHYLCELLULOSE SODIUM 1 DROP: 10 GEL OPHTHALMIC at 04:07

## 2022-01-01 RX ADMIN — CALCIUM GLUCONATE 1000 MG: 20 INJECTION, SOLUTION INTRAVENOUS at 13:21

## 2022-01-01 RX ADMIN — CARBOXYMETHYLCELLULOSE SODIUM 1 DROP: 10 GEL OPHTHALMIC at 08:27

## 2022-01-01 RX ADMIN — DEXTROSE MONOHYDRATE 25 G: 25 INJECTION, SOLUTION INTRAVENOUS at 09:21

## 2022-01-01 RX ADMIN — SODIUM BICARBONATE 50 MEQ: 84 INJECTION INTRAVENOUS at 22:16

## 2022-01-01 RX ADMIN — CARBOXYMETHYLCELLULOSE SODIUM 1 DROP: 10 GEL OPHTHALMIC at 04:39

## 2022-01-01 RX ADMIN — MAGNESIUM SULFATE HEPTAHYDRATE 2000 MG: 40 INJECTION, SOLUTION INTRAVENOUS at 12:27

## 2022-01-01 RX ADMIN — Medication 0.7 MCG/KG/HR: at 22:06

## 2022-01-01 RX ADMIN — Medication: at 06:12

## 2022-01-01 RX ADMIN — SODIUM PHOSPHATE, MONOBASIC, MONOHYDRATE 6 MMOL: 276; 142 INJECTION, SOLUTION INTRAVENOUS at 20:36

## 2022-01-01 RX ADMIN — ACETAMINOPHEN 650 MG: 325 TABLET ORAL at 15:09

## 2022-01-01 RX ADMIN — HEPARIN SODIUM 5000 UNITS: 5000 INJECTION INTRAVENOUS; SUBCUTANEOUS at 09:21

## 2022-01-01 RX ADMIN — PANTOPRAZOLE SODIUM 40 MG: 40 INJECTION, POWDER, FOR SOLUTION INTRAVENOUS at 08:20

## 2022-01-01 RX ADMIN — VASOPRESSIN 0.04 UNITS/MIN: 20 INJECTION INTRAVENOUS at 20:49

## 2022-01-01 RX ADMIN — Medication: at 00:56

## 2022-01-01 RX ADMIN — SODIUM CHLORIDE, PRESERVATIVE FREE 10 ML: 5 INJECTION INTRAVENOUS at 08:28

## 2022-01-01 RX ADMIN — MAGNESIUM SULFATE HEPTAHYDRATE 2000 MG: 40 INJECTION, SOLUTION INTRAVENOUS at 10:15

## 2022-01-01 RX ADMIN — SODIUM PHOSPHATE, MONOBASIC, MONOHYDRATE 12 MMOL: 276; 142 INJECTION, SOLUTION INTRAVENOUS at 02:30

## 2022-01-01 RX ADMIN — SODIUM CHLORIDE, PRESERVATIVE FREE 10 ML: 5 INJECTION INTRAVENOUS at 08:55

## 2022-01-01 RX ADMIN — PANTOPRAZOLE SODIUM 40 MG: 40 INJECTION, POWDER, FOR SOLUTION INTRAVENOUS at 09:12

## 2022-01-01 RX ADMIN — ATROPINE SULFATE 1 DROP: 10 SOLUTION/ DROPS OPHTHALMIC at 16:29

## 2022-01-01 RX ADMIN — VANCOMYCIN HYDROCHLORIDE 500 MG: 500 INJECTION, POWDER, LYOPHILIZED, FOR SOLUTION INTRAVENOUS at 16:15

## 2022-01-01 RX ADMIN — SODIUM PHOSPHATE, MONOBASIC, MONOHYDRATE 18 MMOL: 276; 142 INJECTION, SOLUTION INTRAVENOUS at 08:00

## 2022-01-01 RX ADMIN — CARBOXYMETHYLCELLULOSE SODIUM 1 DROP: 10 GEL OPHTHALMIC at 01:19

## 2022-01-01 RX ADMIN — Medication 15 ML: at 01:35

## 2022-01-01 RX ADMIN — Medication: at 10:00

## 2022-01-01 RX ADMIN — Medication 25 MCG/HR: at 20:19

## 2022-01-01 RX ADMIN — DEXTROSE MONOHYDRATE: 100 INJECTION, SOLUTION INTRAVENOUS at 13:18

## 2022-01-01 RX ADMIN — LORAZEPAM 2 MG: 2 INJECTION INTRAMUSCULAR at 16:26

## 2022-01-01 RX ADMIN — COLLAGENASE SANTYL: 250 OINTMENT TOPICAL at 09:11

## 2022-01-01 RX ADMIN — Medication 15 ML: at 08:45

## 2022-01-01 RX ADMIN — CARBOXYMETHYLCELLULOSE SODIUM 1 DROP: 10 GEL OPHTHALMIC at 13:42

## 2022-01-01 RX ADMIN — Medication 15 ML: at 08:28

## 2022-01-01 RX ADMIN — PANTOPRAZOLE SODIUM 40 MG: 40 INJECTION, POWDER, FOR SOLUTION INTRAVENOUS at 08:27

## 2022-01-01 RX ADMIN — Medication 15 ML: at 08:57

## 2022-01-01 RX ADMIN — SODIUM CHLORIDE, PRESERVATIVE FREE 10 ML: 5 INJECTION INTRAVENOUS at 08:49

## 2022-01-01 RX ADMIN — Medication: at 06:05

## 2022-01-01 RX ADMIN — SODIUM CHLORIDE, PRESERVATIVE FREE 10 ML: 5 INJECTION INTRAVENOUS at 08:22

## 2022-01-01 RX ADMIN — SODIUM CHLORIDE, PRESERVATIVE FREE 10 ML: 5 INJECTION INTRAVENOUS at 22:34

## 2022-01-01 RX ADMIN — MUPIROCIN: 20 OINTMENT TOPICAL at 20:36

## 2022-01-01 RX ADMIN — PANTOPRAZOLE SODIUM 40 MG: 40 INJECTION, POWDER, FOR SOLUTION INTRAVENOUS at 08:55

## 2022-01-01 RX ADMIN — CARBOXYMETHYLCELLULOSE SODIUM 1 DROP: 10 GEL OPHTHALMIC at 01:38

## 2022-01-01 RX ADMIN — Medication 1 MCG/KG/HR: at 09:09

## 2022-01-01 RX ADMIN — DEXTROSE MONOHYDRATE: 100 INJECTION, SOLUTION INTRAVENOUS at 13:29

## 2022-01-01 RX ADMIN — Medication 150 MCG/HR: at 22:16

## 2022-01-01 RX ADMIN — ALBUMIN (HUMAN) 25 G: 12.5 INJECTION, SOLUTION INTRAVENOUS at 16:15

## 2022-01-01 RX ADMIN — CALCIUM GLUCONATE 1000 MG: 20 INJECTION, SOLUTION INTRAVENOUS at 20:37

## 2022-01-01 RX ADMIN — MEROPENEM 1000 MG: 1 INJECTION, POWDER, FOR SOLUTION INTRAVENOUS at 08:41

## 2022-01-01 RX ADMIN — CALCIUM GLUCONATE 1000 MG: 20 INJECTION, SOLUTION INTRAVENOUS at 02:21

## 2022-01-01 RX ADMIN — MINERAL OIL 1 ENEMA: 100 ENEMA RECTAL at 11:00

## 2022-01-01 RX ADMIN — SODIUM CHLORIDE, PRESERVATIVE FREE 10 ML: 5 INJECTION INTRAVENOUS at 20:54

## 2022-01-01 RX ADMIN — Medication 15 ML: at 11:53

## 2022-01-01 RX ADMIN — SODIUM BICARBONATE: 84 INJECTION, SOLUTION INTRAVENOUS at 22:36

## 2022-01-01 RX ADMIN — Medication 15 ML: at 20:36

## 2022-01-01 RX ADMIN — COLLAGENASE SANTYL: 250 OINTMENT TOPICAL at 21:17

## 2022-01-01 RX ADMIN — CARBOXYMETHYLCELLULOSE SODIUM 1 DROP: 10 GEL OPHTHALMIC at 21:21

## 2022-01-01 RX ADMIN — DEXTROSE MONOHYDRATE 1000 ML: 100 INJECTION, SOLUTION INTRAVENOUS at 09:14

## 2022-01-01 RX ADMIN — CARBOXYMETHYLCELLULOSE SODIUM 1 DROP: 10 GEL OPHTHALMIC at 12:12

## 2022-01-01 RX ADMIN — MORPHINE SULFATE 2 MG: 2 INJECTION, SOLUTION INTRAMUSCULAR; INTRAVENOUS at 17:03

## 2022-01-01 RX ADMIN — METHIMAZOLE 5 MG: 5 TABLET ORAL at 10:43

## 2022-01-01 RX ADMIN — Medication: at 09:41

## 2022-01-01 RX ADMIN — Medication: at 11:15

## 2022-01-01 RX ADMIN — DEXTROSE MONOHYDRATE: 100 INJECTION, SOLUTION INTRAVENOUS at 22:48

## 2022-01-01 RX ADMIN — Medication 100 MCG/HR: at 14:51

## 2022-01-01 RX ADMIN — NOREPINEPHRINE BITARTRATE 5 MCG/MIN: 1 SOLUTION INTRAVENOUS at 20:12

## 2022-01-01 RX ADMIN — CARBOXYMETHYLCELLULOSE SODIUM 1 DROP: 10 GEL OPHTHALMIC at 16:23

## 2022-01-01 RX ADMIN — Medication 150 MCG/HR: at 20:36

## 2022-01-01 RX ADMIN — CALCIUM GLUCONATE 1000 MG: 20 INJECTION, SOLUTION INTRAVENOUS at 13:23

## 2022-01-01 RX ADMIN — SODIUM CHLORIDE, PRESERVATIVE FREE 10 ML: 5 INJECTION INTRAVENOUS at 08:45

## 2022-01-01 RX ADMIN — SENNOSIDES 8.6 MG: 8.6 TABLET, COATED ORAL at 09:10

## 2022-01-01 RX ADMIN — POTASSIUM CHLORIDE 20 MEQ: 29.8 INJECTION, SOLUTION INTRAVENOUS at 11:37

## 2022-01-01 RX ADMIN — MIDODRINE HYDROCHLORIDE 5 MG: 5 TABLET ORAL at 09:13

## 2022-01-01 RX ADMIN — CARBOXYMETHYLCELLULOSE SODIUM 1 DROP: 10 GEL OPHTHALMIC at 05:25

## 2022-01-01 RX ADMIN — CARBOXYMETHYLCELLULOSE SODIUM 1 DROP: 10 GEL OPHTHALMIC at 12:04

## 2022-01-01 RX ADMIN — POTASSIUM CHLORIDE 20 MEQ: 29.8 INJECTION, SOLUTION INTRAVENOUS at 10:08

## 2022-01-01 RX ADMIN — MUPIROCIN: 20 OINTMENT TOPICAL at 20:00

## 2022-01-01 RX ADMIN — CALCIUM GLUCONATE 1000 MG: 20 INJECTION, SOLUTION INTRAVENOUS at 01:41

## 2022-01-01 RX ADMIN — Medication 35 MCG/HR: at 18:16

## 2022-01-01 RX ADMIN — SODIUM PHOSPHATE, MONOBASIC, MONOHYDRATE 12 MMOL: 276; 142 INJECTION, SOLUTION INTRAVENOUS at 14:20

## 2022-01-01 RX ADMIN — ACETAMINOPHEN 650 MG: 325 TABLET ORAL at 04:37

## 2022-01-01 RX ADMIN — COLLAGENASE SANTYL: 250 OINTMENT TOPICAL at 08:44

## 2022-01-01 RX ADMIN — CARBOXYMETHYLCELLULOSE SODIUM 1 DROP: 10 GEL OPHTHALMIC at 08:58

## 2022-01-01 RX ADMIN — MEROPENEM 1000 MG: 1 INJECTION, POWDER, FOR SOLUTION INTRAVENOUS at 08:51

## 2022-01-01 RX ADMIN — Medication 0.8 MCG/KG/HR: at 17:57

## 2022-01-01 RX ADMIN — PANTOPRAZOLE SODIUM 40 MG: 40 INJECTION, POWDER, FOR SOLUTION INTRAVENOUS at 09:21

## 2022-01-01 RX ADMIN — Medication 15 ML: at 20:55

## 2022-01-01 RX ADMIN — MEROPENEM 1000 MG: 1 INJECTION, POWDER, FOR SOLUTION INTRAVENOUS at 21:19

## 2022-01-01 RX ADMIN — SODIUM CHLORIDE 804 ML: 9 INJECTION, SOLUTION INTRAVENOUS at 14:58

## 2022-01-01 RX ADMIN — SODIUM CHLORIDE, PRESERVATIVE FREE 10 ML: 5 INJECTION INTRAVENOUS at 09:11

## 2022-01-01 RX ADMIN — SODIUM BICARBONATE: 84 INJECTION, SOLUTION INTRAVENOUS at 21:17

## 2022-01-01 RX ADMIN — MEROPENEM 1000 MG: 1 INJECTION, POWDER, FOR SOLUTION INTRAVENOUS at 21:43

## 2022-01-01 RX ADMIN — SENNOSIDES 8.6 MG: 8.6 TABLET, COATED ORAL at 08:27

## 2022-01-01 RX ADMIN — SODIUM CHLORIDE, PRESERVATIVE FREE 10 ML: 5 INJECTION INTRAVENOUS at 20:37

## 2022-01-01 RX ADMIN — CEFTRIAXONE 2000 MG: 2 INJECTION, POWDER, FOR SOLUTION INTRAMUSCULAR; INTRAVENOUS at 10:30

## 2022-01-01 RX ADMIN — Medication 175 MCG/HR: at 04:55

## 2022-01-01 RX ADMIN — Medication 15 ML: at 23:26

## 2022-01-01 RX ADMIN — Medication 75 MCG/HR: at 10:30

## 2022-01-01 RX ADMIN — MEROPENEM 1000 MG: 1 INJECTION, POWDER, FOR SOLUTION INTRAVENOUS at 20:49

## 2022-01-01 RX ADMIN — SODIUM CHLORIDE, SODIUM GLUCONATE, SODIUM ACETATE, POTASSIUM CHLORIDE AND MAGNESIUM CHLORIDE: 526; 502; 368; 37; 30 INJECTION, SOLUTION INTRAVENOUS at 09:26

## 2022-01-01 RX ADMIN — DEXTROSE MONOHYDRATE 125 ML: 100 INJECTION, SOLUTION INTRAVENOUS at 01:29

## 2022-01-01 RX ADMIN — ACETAMINOPHEN 650 MG: 325 TABLET ORAL at 12:10

## 2022-01-01 RX ADMIN — METHIMAZOLE 5 MG: 5 TABLET ORAL at 08:46

## 2022-01-01 RX ADMIN — Medication 150 MCG/HR: at 04:29

## 2022-01-01 RX ADMIN — SODIUM CHLORIDE, SODIUM GLUCONATE, SODIUM ACETATE, POTASSIUM CHLORIDE AND MAGNESIUM CHLORIDE: 526; 502; 368; 37; 30 INJECTION, SOLUTION INTRAVENOUS at 18:34

## 2022-01-01 RX ADMIN — SENNOSIDES 8.6 MG: 8.6 TABLET, COATED ORAL at 01:36

## 2022-01-01 RX ADMIN — DOCUSATE SODIUM LIQUID 100 MG: 100 LIQUID ORAL at 10:43

## 2022-01-01 RX ADMIN — CARBOXYMETHYLCELLULOSE SODIUM 1 DROP: 10 GEL OPHTHALMIC at 20:15

## 2022-01-01 RX ADMIN — POTASSIUM CHLORIDE 20 MEQ: 29.8 INJECTION, SOLUTION INTRAVENOUS at 05:52

## 2022-01-01 RX ADMIN — Medication 1 MCG/KG/HR: at 22:19

## 2022-01-01 RX ADMIN — CEFEPIME 2000 MG: 2 INJECTION, POWDER, FOR SOLUTION INTRAVENOUS at 08:37

## 2022-01-01 RX ADMIN — CARBOXYMETHYLCELLULOSE SODIUM 1 DROP: 10 GEL OPHTHALMIC at 20:30

## 2022-01-01 RX ADMIN — CALCIUM GLUCONATE 1000 MG: 20 INJECTION, SOLUTION INTRAVENOUS at 14:31

## 2022-01-01 RX ADMIN — VASOPRESSIN 0.04 UNITS/MIN: 20 INJECTION INTRAVENOUS at 02:15

## 2022-01-01 RX ADMIN — CARBOXYMETHYLCELLULOSE SODIUM 1 DROP: 10 GEL OPHTHALMIC at 04:35

## 2022-01-01 RX ADMIN — CEFTRIAXONE 2000 MG: 2 INJECTION, POWDER, FOR SOLUTION INTRAMUSCULAR; INTRAVENOUS at 09:16

## 2022-01-01 RX ADMIN — ALBUMIN (HUMAN) 25 G: 12.5 INJECTION, SOLUTION INTRAVENOUS at 18:24

## 2022-01-01 RX ADMIN — ALBUMIN (HUMAN) 25 G: 0.25 INJECTION, SOLUTION INTRAVENOUS at 00:16

## 2022-01-01 RX ADMIN — CARBOXYMETHYLCELLULOSE SODIUM 1 DROP: 10 GEL OPHTHALMIC at 11:37

## 2022-01-01 RX ADMIN — SODIUM BICARBONATE: 84 INJECTION, SOLUTION INTRAVENOUS at 08:55

## 2022-01-01 RX ADMIN — SENNOSIDES 8.6 MG: 8.6 TABLET, COATED ORAL at 10:12

## 2022-01-01 RX ADMIN — MUPIROCIN: 20 OINTMENT TOPICAL at 22:33

## 2022-01-01 RX ADMIN — NOREPINEPHRINE BITARTRATE 5 MCG/MIN: 1 SOLUTION INTRAVENOUS at 05:18

## 2022-01-01 RX ADMIN — Medication 15 ML: at 09:11

## 2022-01-01 RX ADMIN — METHIMAZOLE 5 MG: 5 TABLET ORAL at 08:28

## 2022-01-01 RX ADMIN — CARBOXYMETHYLCELLULOSE SODIUM 1 DROP: 10 GEL OPHTHALMIC at 09:10

## 2022-01-01 RX ADMIN — IBUPROFEN 400 MG: 400 TABLET, FILM COATED ORAL at 16:10

## 2022-01-01 RX ADMIN — Medication 0.2 MCG/KG/HR: at 18:59

## 2022-01-01 RX ADMIN — CARBOXYMETHYLCELLULOSE SODIUM 1 DROP: 10 GEL OPHTHALMIC at 00:30

## 2022-01-01 RX ADMIN — CARBOXYMETHYLCELLULOSE SODIUM 1 DROP: 10 GEL OPHTHALMIC at 09:25

## 2022-01-01 RX ADMIN — SENNOSIDES 8.6 MG: 8.6 TABLET, COATED ORAL at 10:43

## 2022-01-01 RX ADMIN — CARBOXYMETHYLCELLULOSE SODIUM 1 DROP: 10 GEL OPHTHALMIC at 16:20

## 2022-01-01 RX ADMIN — MUPIROCIN: 20 OINTMENT TOPICAL at 08:23

## 2022-01-01 RX ADMIN — VASOPRESSIN 0.03 UNITS/MIN: 20 INJECTION INTRAVENOUS at 10:48

## 2022-01-01 RX ADMIN — SODIUM CHLORIDE 1000 ML: 9 INJECTION, SOLUTION INTRAVENOUS at 20:20

## 2022-01-01 ASSESSMENT — PULMONARY FUNCTION TESTS
PIF_VALUE: 46
PIF_VALUE: 46
PIF_VALUE: 36
PIF_VALUE: 36
PIF_VALUE: 41
PIF_VALUE: 36
PIF_VALUE: 46
PIF_VALUE: 36
PIF_VALUE: 46
PIF_VALUE: 37
PIF_VALUE: 35
PIF_VALUE: 45
PIF_VALUE: 46
PIF_VALUE: 46
PIF_VALUE: 42
PIF_VALUE: 46
PIF_VALUE: 46
PIF_VALUE: 36
PIF_VALUE: 35
PIF_VALUE: 46
PIF_VALUE: 40
PIF_VALUE: 35
PIF_VALUE: 45
PIF_VALUE: 46
PIF_VALUE: 36
PIF_VALUE: 46
PIF_VALUE: 35
PIF_VALUE: 46
PIF_VALUE: 38
PIF_VALUE: 27
PIF_VALUE: 25
PIF_VALUE: 35
PIF_VALUE: 46
PIF_VALUE: 46
PIF_VALUE: 36
PIF_VALUE: 46
PIF_VALUE: 33
PIF_VALUE: 37
PIF_VALUE: 30
PIF_VALUE: 36
PIF_VALUE: 40
PIF_VALUE: 26
PIF_VALUE: 46
PIF_VALUE: 34
PIF_VALUE: 36
PIF_VALUE: 36
PIF_VALUE: 46
PIF_VALUE: 41
PIF_VALUE: 38
PIF_VALUE: 29
PIF_VALUE: 45
PIF_VALUE: 46
PIF_VALUE: 46
PIF_VALUE: 35
PIF_VALUE: 30
PIF_VALUE: 30
PIF_VALUE: 46
PIF_VALUE: 35
PIF_VALUE: 36
PIF_VALUE: 35
PIF_VALUE: 45
PIF_VALUE: 30
PIF_VALUE: 54
PIF_VALUE: 36
PIF_VALUE: 35
PIF_VALUE: 45
PIF_VALUE: 28
PIF_VALUE: 31
PIF_VALUE: 29
PIF_VALUE: 36
PIF_VALUE: 36
PIF_VALUE: 46
PIF_VALUE: 31
PIF_VALUE: 35
PIF_VALUE: 39
PIF_VALUE: 45
PIF_VALUE: 41
PIF_VALUE: 46
PIF_VALUE: 42
PIF_VALUE: 46
PIF_VALUE: 45
PIF_VALUE: 36
PIF_VALUE: 46
PIF_VALUE: 46
PIF_VALUE: 36
PIF_VALUE: 46
PIF_VALUE: 42
PIF_VALUE: 24
PIF_VALUE: 46
PIF_VALUE: 36
PIF_VALUE: 42
PIF_VALUE: 36
PIF_VALUE: 43
PIF_VALUE: 36
PIF_VALUE: 45
PIF_VALUE: 36
PIF_VALUE: 36
PIF_VALUE: 55
PIF_VALUE: 31
PIF_VALUE: 28
PIF_VALUE: 46
PIF_VALUE: 35
PIF_VALUE: 46
PIF_VALUE: 47
PIF_VALUE: 45
PIF_VALUE: 36
PIF_VALUE: 45
PIF_VALUE: 46
PIF_VALUE: 25
PIF_VALUE: 35
PIF_VALUE: 45
PIF_VALUE: 30
PIF_VALUE: 36
PIF_VALUE: 31
PIF_VALUE: 46
PIF_VALUE: 46
PIF_VALUE: 41
PIF_VALUE: 31
PIF_VALUE: 45
PIF_VALUE: 46
PIF_VALUE: 27
PIF_VALUE: 35
PIF_VALUE: 36
PIF_VALUE: 35
PIF_VALUE: 46
PIF_VALUE: 29
PIF_VALUE: 36
PIF_VALUE: 46
PIF_VALUE: 31
PIF_VALUE: 36
PIF_VALUE: 35
PIF_VALUE: 46
PIF_VALUE: 36
PIF_VALUE: 40
PIF_VALUE: 46
PIF_VALUE: 33
PIF_VALUE: 35
PIF_VALUE: 45
PIF_VALUE: 46
PIF_VALUE: 45
PIF_VALUE: 35
PIF_VALUE: 45
PIF_VALUE: 35
PIF_VALUE: 46
PIF_VALUE: 36
PIF_VALUE: 36
PIF_VALUE: 46
PIF_VALUE: 46
PIF_VALUE: 32
PIF_VALUE: 36
PIF_VALUE: 55
PIF_VALUE: 36
PIF_VALUE: 30
PIF_VALUE: 46
PIF_VALUE: 29
PIF_VALUE: 36
PIF_VALUE: 36
PIF_VALUE: 31
PIF_VALUE: 32
PIF_VALUE: 44
PIF_VALUE: 42
PIF_VALUE: 46
PIF_VALUE: 36
PIF_VALUE: 46
PIF_VALUE: 33
PIF_VALUE: 35
PIF_VALUE: 36
PIF_VALUE: 36
PIF_VALUE: 31
PIF_VALUE: 36
PIF_VALUE: 33
PIF_VALUE: 35
PIF_VALUE: 46
PIF_VALUE: 36
PIF_VALUE: 46
PIF_VALUE: 36
PIF_VALUE: 31
PIF_VALUE: 38
PIF_VALUE: 39
PIF_VALUE: 35
PIF_VALUE: 46
PIF_VALUE: 36
PIF_VALUE: 45
PIF_VALUE: 43
PIF_VALUE: 46
PIF_VALUE: 30
PIF_VALUE: 46
PIF_VALUE: 36
PIF_VALUE: 31
PIF_VALUE: 46
PIF_VALUE: 36
PIF_VALUE: 45
PIF_VALUE: 46
PIF_VALUE: 29
PIF_VALUE: 36
PIF_VALUE: 45
PIF_VALUE: 33
PIF_VALUE: 36
PIF_VALUE: 45
PIF_VALUE: 46
PIF_VALUE: 46
PIF_VALUE: 35
PIF_VALUE: 31
PIF_VALUE: 32
PIF_VALUE: 35
PIF_VALUE: 31
PIF_VALUE: 36
PIF_VALUE: 36
PIF_VALUE: 46
PIF_VALUE: 36
PIF_VALUE: 46
PIF_VALUE: 46
PIF_VALUE: 36
PIF_VALUE: 36
PIF_VALUE: 46
PIF_VALUE: 35
PIF_VALUE: 36
PIF_VALUE: 36
PIF_VALUE: 43
PIF_VALUE: 36
PIF_VALUE: 46
PIF_VALUE: 46
PIF_VALUE: 40
PIF_VALUE: 35
PIF_VALUE: 35
PIF_VALUE: 31
PIF_VALUE: 35
PIF_VALUE: 46
PIF_VALUE: 30
PIF_VALUE: 48
PIF_VALUE: 46
PIF_VALUE: 46
PIF_VALUE: 42
PIF_VALUE: 36
PIF_VALUE: 35
PIF_VALUE: 46
PIF_VALUE: 31
PIF_VALUE: 36
PIF_VALUE: 36
PIF_VALUE: 45
PIF_VALUE: 45
PIF_VALUE: 36
PIF_VALUE: 46
PIF_VALUE: 35
PIF_VALUE: 31
PIF_VALUE: 31
PIF_VALUE: 30
PIF_VALUE: 46
PIF_VALUE: 35
PIF_VALUE: 27
PIF_VALUE: 37
PIF_VALUE: 46
PIF_VALUE: 36
PIF_VALUE: 36
PIF_VALUE: 46
PIF_VALUE: 36
PIF_VALUE: 31
PIF_VALUE: 36
PIF_VALUE: 42
PIF_VALUE: 31
PIF_VALUE: 46
PIF_VALUE: 46
PIF_VALUE: 35
PIF_VALUE: 46
PIF_VALUE: 30
PIF_VALUE: 35
PIF_VALUE: 36
PIF_VALUE: 36
PIF_VALUE: 46
PIF_VALUE: 26
PIF_VALUE: 31
PIF_VALUE: 30
PIF_VALUE: 46
PIF_VALUE: 36
PIF_VALUE: 46
PIF_VALUE: 44
PIF_VALUE: 27
PIF_VALUE: 46
PIF_VALUE: 36
PIF_VALUE: 46
PIF_VALUE: 46
PIF_VALUE: 38
PIF_VALUE: 46
PIF_VALUE: 46
PIF_VALUE: 55
PIF_VALUE: 36
PIF_VALUE: 46
PIF_VALUE: 34
PIF_VALUE: 33
PIF_VALUE: 46
PIF_VALUE: 43
PIF_VALUE: 46
PIF_VALUE: 31
PIF_VALUE: 46
PIF_VALUE: 33
PIF_VALUE: 35
PIF_VALUE: 46
PIF_VALUE: 45
PIF_VALUE: 46
PIF_VALUE: 40
PIF_VALUE: 46
PIF_VALUE: 32
PIF_VALUE: 36
PIF_VALUE: 39
PIF_VALUE: 36
PIF_VALUE: 45
PIF_VALUE: 35
PIF_VALUE: 46
PIF_VALUE: 36
PIF_VALUE: 31
PIF_VALUE: 35
PIF_VALUE: 46
PIF_VALUE: 46
PIF_VALUE: 45
PIF_VALUE: 46
PIF_VALUE: 29
PIF_VALUE: 31
PIF_VALUE: 46
PIF_VALUE: 46
PIF_VALUE: 36
PIF_VALUE: 36
PIF_VALUE: 46
PIF_VALUE: 35
PIF_VALUE: 32
PIF_VALUE: 46
PIF_VALUE: 36
PIF_VALUE: 36
PIF_VALUE: 45
PIF_VALUE: 31
PIF_VALUE: 31
PIF_VALUE: 46
PIF_VALUE: 36
PIF_VALUE: 46
PIF_VALUE: 36
PIF_VALUE: 30
PIF_VALUE: 36
PIF_VALUE: 36
PIF_VALUE: 35
PIF_VALUE: 18
PIF_VALUE: 36
PIF_VALUE: 31
PIF_VALUE: 46
PIF_VALUE: 36
PIF_VALUE: 36
PIF_VALUE: 46
PIF_VALUE: 30

## 2022-09-01 NOTE — PROGRESS NOTES
Hadley Boyers    Age 49 y.o.    female    1975    MRN [id number]    9/12/2022  Arrival Time_____________  OR Time____________75 Min     Procedure(s):  SURGICAL EXTRACTION OF TOOTH #8                      General    Surgeon(s):  Antonia Roper, DMD       Phone [phone] ([facility])     [address] [place]  Cell         Work  _____________________________________________________________________  _____________________________________________________________________  _____________________________________________________________________  _____________________________________________________________________  _____________________________________________________________________    PCP _____________________________ Phone_________________     H&P__________________Bringing      Chart            Epic   DOS      Called________  EKG__________________Bringing      Chart            Epic   DOS      Called________  LAB__________________ Bringing      Chart            Epic   DOS      Called________  Cardiac Clearance_______Bringing      Chart            Epic      DOS      Called________    Cardiologist________________________ Phone___________________________    ? Zoroastrianism concerns / Waiver on Chart            PAT Communications________________  ? Pre-op Instructions Given [place]          _________________________________  ? Directions to Surgery Center                          _________________________________  ? Transportation Home_______________      __________________________________  ?  Crutches/Walker__________________        __________________________________    ________Pre-op Orders   _______Transcribed    _______Wt.  ________Pharmacy          _______SCD  ______VTE     ______TED Cori Keas  _______  Surgery Consent    _______  Anesthesia Consent         COVID DATE______________LOCATION________________ RESULT__________

## 2022-09-06 RX ORDER — FERROUS SULFATE 325(65) MG
325 TABLET ORAL
COMMUNITY

## 2022-09-06 RX ORDER — DIAPER,BRIEF,INFANT-TODD,DISP
EACH MISCELLANEOUS PRN
COMMUNITY

## 2022-09-06 NOTE — PROGRESS NOTES

## 2022-09-06 NOTE — PROGRESS NOTES
Obstructive Sleep Apnea (RODRIGO) Screening     Patient:  Sigrid Montano    YOB: 1975      Medical Record #:  [id number]                     Date:  9/6/2022     1. Are you a loud and/or regular snorer? []  Yes       [x] No    2. Have you been observed to gasp or stop breathing during sleep? []  Yes       [x] No    3. Do you feel tired or groggy upon awakening or do you awaken with a headache?           []  Yes       [x] No    4. Are you often tired or fatigued during the wake time hours? []  Yes       [] No    5. Do you fall asleep sitting, reading, watching TV or driving? []  Yes       [] No    6. Do you often have problems with memory or concentration? []  Yes       [] No    **If patient's score is ? 3 they are considered high risk for RODRIGO. An Anesthesia provider will evaluate the patient and develop a plan of care the day of surgery. Note:  If the patient's BMI is more than 35 kg m¯² , has neck circumference > 40 cm, and/or high blood pressure the risk is greater (© American Sleep Apnea Association, 2006).

## 2022-09-12 ENCOUNTER — ANESTHESIA EVENT (OUTPATIENT)
Dept: OPERATING ROOM | Age: 47
End: 2022-09-12

## 2022-09-12 ENCOUNTER — HOSPITAL ENCOUNTER (OUTPATIENT)
Age: 47
Setting detail: OUTPATIENT SURGERY
Discharge: HOME | End: 2022-09-12
Attending: DENTIST | Admitting: DENTIST

## 2022-09-12 ENCOUNTER — ANESTHESIA (OUTPATIENT)
Dept: OPERATING ROOM | Age: 47
End: 2022-09-12

## 2022-09-12 VITALS
WEIGHT: 58.8 LBS | DIASTOLIC BLOOD PRESSURE: 82 MMHG | OXYGEN SATURATION: 98 % | TEMPERATURE: 98.6 F | RESPIRATION RATE: 16 BRPM | SYSTOLIC BLOOD PRESSURE: 137 MMHG | HEART RATE: 86 BPM | BODY MASS INDEX: 13.61 KG/M2 | HEIGHT: 55 IN

## 2022-09-12 PROCEDURE — 6370000000 HC RX 637 (ALT 250 FOR IP): Performed by: DENTIST

## 2022-09-12 PROCEDURE — 2500000003 HC RX 250 WO HCPCS: Performed by: DENTIST

## 2022-09-12 PROCEDURE — 3600000003 HC SURGERY LEVEL 3 BASE: Performed by: DENTIST

## 2022-09-12 PROCEDURE — 7100000011 HC PHASE II RECOVERY - ADDTL 15 MIN: Performed by: DENTIST

## 2022-09-12 PROCEDURE — 7100000010 HC PHASE II RECOVERY - FIRST 15 MIN: Performed by: DENTIST

## 2022-09-12 PROCEDURE — 3600000013 HC SURGERY LEVEL 3 ADDTL 15MIN: Performed by: DENTIST

## 2022-09-12 PROCEDURE — 2709999900 HC NON-CHARGEABLE SUPPLY: Performed by: DENTIST

## 2022-09-12 RX ORDER — LIDOCAINE HYDROCHLORIDE 10 MG/ML
0.3 INJECTION, SOLUTION EPIDURAL; INFILTRATION; INTRACAUDAL; PERINEURAL
Status: DISCONTINUED | OUTPATIENT
Start: 2022-09-12 | End: 2022-09-12 | Stop reason: HOSPADM

## 2022-09-12 RX ORDER — DIPHENHYDRAMINE HYDROCHLORIDE 50 MG/ML
6.25 INJECTION INTRAMUSCULAR; INTRAVENOUS
Status: DISCONTINUED | OUTPATIENT
Start: 2022-09-12 | End: 2022-09-12 | Stop reason: HOSPADM

## 2022-09-12 RX ORDER — SODIUM CHLORIDE 0.9 % (FLUSH) 0.9 %
5-40 SYRINGE (ML) INJECTION EVERY 12 HOURS SCHEDULED
Status: DISCONTINUED | OUTPATIENT
Start: 2022-09-12 | End: 2022-09-12 | Stop reason: HOSPADM

## 2022-09-12 RX ORDER — ONDANSETRON 2 MG/ML
4 INJECTION INTRAMUSCULAR; INTRAVENOUS EVERY 30 MIN PRN
Status: DISCONTINUED | OUTPATIENT
Start: 2022-09-12 | End: 2022-09-12 | Stop reason: HOSPADM

## 2022-09-12 RX ORDER — SODIUM CHLORIDE, SODIUM LACTATE, POTASSIUM CHLORIDE, CALCIUM CHLORIDE 600; 310; 30; 20 MG/100ML; MG/100ML; MG/100ML; MG/100ML
INJECTION, SOLUTION INTRAVENOUS CONTINUOUS
Status: DISCONTINUED | OUTPATIENT
Start: 2022-09-12 | End: 2022-09-12 | Stop reason: HOSPADM

## 2022-09-12 RX ORDER — SODIUM CHLORIDE 9 MG/ML
25 INJECTION, SOLUTION INTRAVENOUS PRN
Status: DISCONTINUED | OUTPATIENT
Start: 2022-09-12 | End: 2022-09-12 | Stop reason: HOSPADM

## 2022-09-12 RX ORDER — OXYCODONE HYDROCHLORIDE 5 MG/1
5 TABLET ORAL
Status: DISCONTINUED | OUTPATIENT
Start: 2022-09-12 | End: 2022-09-12 | Stop reason: HOSPADM

## 2022-09-12 RX ORDER — SODIUM CHLORIDE 0.9 % (FLUSH) 0.9 %
5-40 SYRINGE (ML) INJECTION PRN
Status: DISCONTINUED | OUTPATIENT
Start: 2022-09-12 | End: 2022-09-12 | Stop reason: HOSPADM

## 2022-09-12 RX ORDER — CEFAZOLIN SODIUM 1 G/3ML
INJECTION, POWDER, FOR SOLUTION INTRAMUSCULAR; INTRAVENOUS
Status: DISCONTINUED
Start: 2022-09-12 | End: 2022-09-12 | Stop reason: HOSPADM

## 2022-09-12 RX ORDER — BUPIVACAINE HYDROCHLORIDE AND EPINEPHRINE 2.5; 5 MG/ML; UG/ML
INJECTION, SOLUTION EPIDURAL; INFILTRATION; INTRACAUDAL; PERINEURAL PRN
Status: DISCONTINUED | OUTPATIENT
Start: 2022-09-12 | End: 2022-09-12 | Stop reason: ALTCHOICE

## 2022-09-12 RX ORDER — MIDAZOLAM HYDROCHLORIDE 1 MG/ML
2 INJECTION INTRAMUSCULAR; INTRAVENOUS
Status: DISCONTINUED | OUTPATIENT
Start: 2022-09-12 | End: 2022-09-12 | Stop reason: HOSPADM

## 2022-09-12 RX ORDER — CHLORHEXIDINE GLUCONATE 0.12 MG/ML
15 RINSE ORAL ONCE
Status: COMPLETED | OUTPATIENT
Start: 2022-09-12 | End: 2022-09-12

## 2022-09-12 RX ORDER — MEPERIDINE HYDROCHLORIDE 50 MG/ML
12.5 INJECTION INTRAMUSCULAR; INTRAVENOUS; SUBCUTANEOUS EVERY 5 MIN PRN
Status: DISCONTINUED | OUTPATIENT
Start: 2022-09-12 | End: 2022-09-12 | Stop reason: HOSPADM

## 2022-09-12 RX ORDER — OXYMETAZOLINE HYDROCHLORIDE 0.05 G/100ML
2 SPRAY NASAL ONCE
Status: COMPLETED | OUTPATIENT
Start: 2022-09-12 | End: 2022-09-12

## 2022-09-12 RX ORDER — SODIUM CHLORIDE 9 MG/ML
INJECTION, SOLUTION INTRAVENOUS PRN
Status: DISCONTINUED | OUTPATIENT
Start: 2022-09-12 | End: 2022-09-12 | Stop reason: HOSPADM

## 2022-09-12 RX ADMIN — OXYMETAZOLINE HCL 2 SPRAY: 0.05 SPRAY NASAL at 07:00

## 2022-09-12 RX ADMIN — Medication 15 ML: at 06:59

## 2022-09-12 NOTE — H&P
The H&P in the chart was reviewed, the patient was examined, and no change has occurred in the patient's condition since the H&P was completed.     Electronically signed by Audrey Sandoval DMD on 9/12/2022 at 7:24 AM

## 2022-09-12 NOTE — OP NOTE
Operative Note      Patient: Darcy Moreno  YOB: 1975  MRN: [id number]    Date of Procedure: 9/12/2022    Pre-Op Diagnosis: DENTAL CARIES tooth #8    Post-Op Diagnosis: Same       Procedure(s):  SURGICAL EXTRACTION OF TOOTH #8    Surgeon(s):  Ira Yen DMD    Assistant:   Dean Stoner    Anesthesia: IV sedation (propofol, versed, fentanyl)    Estimated Blood Loss (mL): Minimal    Complications: None    Specimens: None    Implants: None      Drains: None    Findings: Tooth #8 fractured to the gum line with gross caries    Detailed Description of Procedure: The patient was placed in supine position on the operating table. Following induction of IV sedation, patient was draped in sterile fashion. 2 ml of 0.25% marcaine was infiltrated at surgical sites. Tooth #8 was elevated and extracted surgically. Site irrigated and curetted. Closed using 3.0 chromic gut. The throat pack was removed. Tolerated well. No complications. At the conclusion of the procedure the patient emerged from anesthesia without complication. She was taken to the PACU postoperatively.          Electronically signed by Ira Yen DMD on 9/12/2022 at 7:26 AM

## 2022-09-12 NOTE — ANESTHESIA PRE PROCEDURE
Department of Anesthesiology  Preprocedure Note       Name:  Shyanne Lemos   Age:  49 y.o.  :  1975                                          MRN:  [id number]         Date:  2022      Surgeon: Stefanie Decker):  Farshad Gray DMD    Procedure: Procedure(s):  SURGICAL EXTRACTION OF TOOTH #8    Medications prior to admission:   Prior to Admission medications    Medication Sig Start Date End Date Taking? Authorizing Provider   ferrous sulfate (IRON 325) 325 (65 Fe) MG tablet Take 325 mg by mouth daily (with breakfast)   Yes Historical Provider, MD   hydrocortisone 0.5 % cream Apply topically as needed Apply topically 2 times daily. Yes Historical Provider, MD   trimethoprim (TRIMPEX) 100 MG tablet Take 100 mg by mouth daily    Historical Provider, MD   methimazole (TAPAZOLE) 5 MG tablet Take 5 mg by mouth daily    Historical Provider, MD   acetaminophen (TYLENOL) 325 MG tablet Take 650 mg by mouth every 6 hours as needed for Pain    Historical Provider, MD   Cholecalciferol (VITAMIN D3) 2000 UNITS CAPS Take 1,000 Units by mouth daily    Historical Provider, MD   mineral oil-hydrophilic petrolatum (AQUAPHOR) ointment Apply topically daily     Historical Provider, MD   metoprolol (TOPROL-XL) 25 MG XL tablet Take 25 mg by mouth daily.       Historical Provider, MD   oxybutynin (DITROPAN XL) 10 MG CR tablet Take 10 mg by mouth 2 times daily     Historical Provider, MD       Current medications:    Current Facility-Administered Medications   Medication Dose Route Frequency Provider Last Rate Last Admin    ceFAZolin (ANCEF) 2000 mg in dextrose 5 % 100 mL IVPB  2,000 mg IntraVENous Once Farshad Gray DMD        lidocaine PF 1 % injection 0.3 mL  0.3 mL IntraDERmal Once PRN Giovanny Christensen MD        lactated ringers infusion   IntraVENous Continuous Giovanny Christensen MD        sodium chloride flush 0.9 % injection 5-40 mL  5-40 mL IntraVENous 2 times per day Giovanny Christensen MD        sodium chloride flush 0.9 % injection 5-40 mL  5-40 mL IntraVENous PRN Bertin Osuna MD        0.9 % sodium chloride infusion   IntraVENous PRN Bertin Osuna MD         Current Outpatient Medications   Medication Sig Dispense Refill    ferrous sulfate (IRON 325) 325 (65 Fe) MG tablet Take 325 mg by mouth daily (with breakfast)      hydrocortisone 0.5 % cream Apply topically as needed Apply topically 2 times daily.  trimethoprim (TRIMPEX) 100 MG tablet Take 100 mg by mouth daily      methimazole (TAPAZOLE) 5 MG tablet Take 5 mg by mouth daily      acetaminophen (TYLENOL) 325 MG tablet Take 650 mg by mouth every 6 hours as needed for Pain      Cholecalciferol (VITAMIN D3) 2000 UNITS CAPS Take 1,000 Units by mouth daily      mineral oil-hydrophilic petrolatum (AQUAPHOR) ointment Apply topically daily       metoprolol (TOPROL-XL) 25 MG XL tablet Take 25 mg by mouth daily.         oxybutynin (DITROPAN XL) 10 MG CR tablet Take 10 mg by mouth 2 times daily          Allergies:  No Known Allergies    Problem List:    Patient Active Problem List   Diagnosis Code    Pneumonia J18.9    HTN (hypertension) I10    Spina bifida ([address] Q05.9       Past Medical History:        Diagnosis Date    Hypertension     Mental retardation     functions ~ age 14    Paraplegia ([address]     S/P  shunt     behind right ear    Scoliosis     Hard time laying flat    Spina bifida ([address]     Wheelchair bound    Strabismus     Thyroid disease     hyperthyroidism    Toxic multinodular goiter     Urinary incontinence     self caths QID    UTI (urinary tract infection)     self caths every 4 hours       Past Surgical History:        Procedure Laterality Date    BACK SURGERY      Rods for scoliosis    DENTAL SURGERY N/A 2/5/2020    DENTAL EXTRACTION # 19 performed by Roberto [facility]iman DMD [facility]      Multiple    VENTRICULOPERITONEAL SHUNT      WISDOM TOOTH EXTRACTION         Social History:    Social History     Tobacco Use    Smoking status: Never    Smokeless tobacco: Never   Substance Use Topics    Alcohol use: No                                Counseling given: Not Answered      Vital Signs (Current):   Vitals:    09/06/22 1425 09/06/22 1426   Weight:  58 lb 12.8 oz (26.7 kg)   Height: 4' (1.219 m)                                               BP Readings from Last 3 Encounters:   02/05/20 121/74   02/05/20 122/70   02/14/16 130/76       NPO Status:                                                                                 BMI:   Wt Readings from Last 3 Encounters:   09/06/22 58 lb 12.8 oz (26.7 kg)   01/30/20 58 lb (26.3 kg)   02/07/16 64 lb (29 kg)     Body mass index is 17.94 kg/m². CBC:   Lab Results   Component Value Date/Time    WBC 9.5 02/14/2016 05:21 AM    RBC 4.28 02/14/2016 05:21 AM    HGB 11.0 02/14/2016 05:21 AM    HCT 35.1 02/14/2016 05:21 AM    MCV 82.0 02/14/2016 05:21 AM    RDW 14.8 02/14/2016 05:21 AM     02/14/2016 05:21 AM       CMP:   Lab Results   Component Value Date/Time     06/24/2021 04:03 PM    K 4.6 06/24/2021 04:03 PM    CL 99 06/24/2021 04:03 PM    CO2 26 06/24/2021 04:03 PM    BUN 10 06/24/2021 04:03 PM    CREATININE <0.5 06/24/2021 04:03 PM    GFRAA >60 06/24/2021 04:03 PM    GFRAA >60 11/29/2011 11:13 PM    AGRATIO 0.8 02/07/2016 08:24 PM    LABGLOM >60 06/24/2021 04:03 PM    GLUCOSE 64 06/24/2021 04:03 PM    PROT 7.1 02/07/2016 08:24 PM    PROT 7.7 11/29/2011 11:13 PM    CALCIUM 9.7 06/24/2021 04:03 PM    BILITOT <0.2 02/07/2016 08:24 PM    ALKPHOS 81 02/07/2016 08:24 PM    AST 21 02/07/2016 08:24 PM    ALT 11 02/07/2016 08:24 PM       POC Tests: No results for input(s): POCGLU, POCNA, POCK, POCCL, POCBUN, POCHEMO, POCHCT in the last 72 hours.     Coags: No results found for: PROTIME, INR, APTT    HCG (If Applicable):   Lab Results   Component Value Date    PREGTESTUR Negative 02/05/2020        ABGs:   Lab Results   Component Value Date/Time    PHART 7.449 02/09/2016 08:33 PM    PO2ART 67.6 02/09/2016 08:33 PM    HSH5FTC 48.7 02/09/2016 08:33 PM    RYH8QXP 33.0 02/09/2016 08:33 PM    BEART 7.9 02/09/2016 08:33 PM    D1NDMBBE 93.8 02/09/2016 08:33 PM        Type & Screen (If Applicable):  No results found for: LABABO, LABRH    Drug/Infectious Status (If Applicable):  No results found for: HIV, HEPCAB    COVID-19 Screening (If Applicable): No results found for: COVID19        Anesthesia Evaluation  Patient summary reviewed and Nursing notes reviewed no history of anesthetic complications:   Airway: Mallampati: II     Neck ROM: full     Dental:          Pulmonary:   (+) pneumonia:                             Cardiovascular:    (+) hypertension:,                   Neuro/Psych:   (+) psychiatric history:            GI/Hepatic/Renal:             Endo/Other:                     Abdominal:             Vascular: Other Findings:           Anesthesia Plan      general     ASA 3     (Medications & allergies reviewed  All available lab & EKG data reviewed)  Induction: intravenous. Anesthetic plan and risks discussed with patient. Plan discussed with CRNA.                     Roby Mirza MD   9/12/2022

## 2022-09-12 NOTE — DISCHARGE INSTRUCTIONS
Given to patient     ANESTHESIA DISCHARGE INSTRUCTIONS    You are under the influence of drugs- do not drink alcohol, drive a car, operate machinery(such as power tools, kitchen appliances, etc), sign legal documents, or make any important decisions for 24 hours (or while on pain medications). Children should not ride bikes or [place] or play on gym sets  for 24 hours after surgery. A responsible adult should be with you for 24 hours. Rest at home today- increase activity as tolerated. Progress slowly to a regular diet unless your physician has instructed you otherwise. Drink plenty of water. CALL YOUR DOCTOR IF YOU:  Have moderate to severe nausea or vomiting AND are unable to hold down fluids or prescribed medications. Have bright red bloody drainage from your dressing that won't stop oozing. Do not get relief with your pain medication    NORMAL (POSSIBLE) SIDE EFFECTS FROM ANESTHESIA:     Confusion, temporary memory loss, delayed reaction times in the first 24 hours  Lightheadedness, dizziness, difficulty focusing, blurred vision  Nausea/vomiting can happen  Shivering, feeling cold, sore throat, cough and muscle aches should stop within 24-48 hours  Trouble urinating - call your surgeon if it has been more than 8 hrs  Bruising or soreness at the IV site - call if it remains red, firm or there is drainage             FEMALES OF CHILDBEARING AGE WHO ARE TAKING BIRTH CONTROL PILLS:  You may have received a medication during your procedure that interferes with the   actions of birth control pills (Bridion or Emend). Use some other kind of birth control in addition to your pills, like a condom, for 1 month after your procedure to prevent unwanted pregnancy. The following instructions are to be followed if you have a known history or diagnosis of sleep apnea: For all sleep apnea patients:  ? Sleep on your side or sitting up in a chair whenever possible, especially the first 24 hours after surgery.   ? Use only medicines prescribed by your doctor. ? Do not drink alcohol. ? If you have a dental device to assist you while at rest, use it at all times for the first 24 hours. For patients using CPAP machines:  ? Use your CPAP machine during all periods of sleep as usual.  ? Use your CPAP machine during all periods of daytime rest while on pain medicines. ** Follow up with your primary care doctor for continued care. IF YOU DO NOT TAKE ALL OF YOUR NARCOTIC PAIN MEDICATION, please dispose of them responsibly. There are drop off boxes in the Emergency Departments 24/7 at both [facility] and [place] [facility]. If these locations are not convenient, other options for discarding them can be found at:  [email or web address]    Hospital or office staff may NOT accept any medications to drop off in the cabinet for you.

## 2022-09-12 NOTE — PROGRESS NOTES
Patient arrived to pacu, awake and oriented, denies pain or nausea. VSS. No drainage from incision, suture insitu. See paper charting for assessment as electronic chart was not accessible during patient visit.

## 2022-09-12 NOTE — ANESTHESIA POSTPROCEDURE EVALUATION
Department of Anesthesiology  Postprocedure Note    Patient: Ronaldo Soto  MRN: [id number]  YOB: 1975  Date of evaluation: 9/12/2022      Procedure Summary     Date: 09/12/22 Room / Location: [address] [place] [address]    Anesthesia Start:  Anesthesia Stop:     Procedure: SURGICAL EXTRACTION OF TOOTH #8 (Mouth) Diagnosis:       Dental caries on pit and fissure surface penetrating into dentin      (DENTAL CARIES)    Surgeons: Jj Ramirez DMD Responsible Provider:     Anesthesia Type: general ASA Status: 3          Anesthesia Type: No value filed.     Lewis Phase I:      Lewis Phase II: Lewis Score: 10      Anesthesia Post Evaluation    Comments: Postoperative Anesthesia Note    Name:    Ronaldo Soto  MRN:      [id number]    Patient Vitals in the past 12 hrs:  09/12/22 0836, BP:137/82, Temp:98.6 °F (37 °C), Temp src:Temporal, Pulse:86, Resp:16, SpO2:98 %  09/12/22 0830, BP:124/75, Pulse:88, Resp:16, SpO2:99 %  09/12/22 0825, BP:117/76, Pulse:91, Resp:16, SpO2:99 %  09/12/22 0820, BP:111/73, Temp:98.6 °F (37 °C), Temp src:Temporal, Pulse:90, Resp:16, SpO2:98 %  09/12/22 0700, BP:(!) 144/78, Temp:96.8 °F (36 °C), Pulse:(!) 101, Resp:16, SpO2:98 %     LABS:    CBC  Lab Results       Component                Value               Date/Time                  WBC                      9.5                 02/14/2016 05:21 AM        HGB                      11.0 (L)            02/14/2016 05:21 AM        HCT                      35.1 (L)            02/14/2016 05:21 AM        PLT                      690 (H)             02/14/2016 05:21 AM   RENAL  Lab Results       Component                Value               Date/Time                  NA                       138                 06/24/2021 04:03 PM        K                        4.6                 06/24/2021 04:03 PM        CL                       99                  06/24/2021 04:03 PM        CO2                      26 06/24/2021 04:03 PM        BUN                      10                  06/24/2021 04:03 PM        CREATININE               <0.5 (L)            06/24/2021 04:03 PM        GLUCOSE                  64 (L)              06/24/2021 04:03 PM   COAGS  No results found for: PROTIME, INR, APTT    Intake & Output: In: 150 (P.O.:100; I.V.:50)  Out: -     Nausea & Vomiting:  No    Level of Consciousness:  Awake    Pain Assessment:  Adequate analgesia    Anesthesia Complications:  No apparent anesthetic complications    SUMMARY      Vital signs stable  OK to discharge from Stage I post anesthesia care.   Care transferred from Anesthesiology department on discharge from perioperative area

## 2022-12-22 PROBLEM — A41.9 SEPSIS (HCC): Status: ACTIVE | Noted: 2022-01-01

## 2022-12-22 PROBLEM — I47.1 SVT (SUPRAVENTRICULAR TACHYCARDIA) (HCC): Status: ACTIVE | Noted: 2022-01-01

## 2022-12-22 PROBLEM — E05.90 HYPERTHYROIDISM: Status: ACTIVE | Noted: 2022-01-01

## 2022-12-22 PROBLEM — M41.55 OTHER SECONDARY SCOLIOSIS, THORACOLUMBAR REGION: Status: ACTIVE | Noted: 2022-01-01

## 2022-12-22 PROBLEM — E87.20 LACTIC ACIDOSIS: Status: ACTIVE | Noted: 2022-01-01

## 2022-12-22 PROBLEM — E87.20 METABOLIC ACIDEMIA: Status: ACTIVE | Noted: 2022-01-01

## 2022-12-22 PROBLEM — R65.20 SEVERE SEPSIS (HCC): Status: ACTIVE | Noted: 2022-01-01

## 2022-12-22 PROBLEM — R82.81 PYURIA: Status: ACTIVE | Noted: 2022-01-01

## 2022-12-22 PROBLEM — R74.8 ELEVATED ALKALINE PHOSPHATASE LEVEL: Status: ACTIVE | Noted: 2022-01-01

## 2022-12-22 PROBLEM — D72.825 BANDEMIA: Status: ACTIVE | Noted: 2022-01-01

## 2022-12-22 PROBLEM — R79.89 ELEVATED PROCALCITONIN: Status: ACTIVE | Noted: 2022-01-01

## 2022-12-22 NOTE — CONSULTS
INPATIENT PULMONARY CRITICAL CARE CONSULT NOTE      Chief Complaint/Referring Provider:  Patient is being seen at the request of Dr. Alan Hernandez for a consultation for severe sepsis     Presenting HPI: Patient Was brought to the hospital with altered mental status    As per admitting provider-47 y.o. female who presented to [facility] with [address]. Patient has a history of spinal bifida and mental retardation. She is wheel chair bound and her mother is her caregiver and decision maker. Per patient's mother, patient started seeming fatigued a few hours ago. This quickly progressed and patient started to seem somnolent. By the time she presented to the ED, patient was unarousable. She had a high fever and severe tachycardia. She regained consciousness once IVF were started. Patient has a neurogenic bladder and straight caths multiple times per day. She had evidence of UTI on a UA. Patient had good response to IVF but remains critically ill in ED.   Patient was transferred to the ICU for further management, patient when seen in the ICU was appeared to be critically ill, patient had borderline blood pressure and patient was breathing about 40 times a minute along with that patient had a heart rate of 150 and patient was having profound increase in work of breathing and patient was having increased shortness of breath and patient was having intermittent paroxysmal and paradoxical breathing and patient was decompensated last and patient's clinical status was discussed with patient and also patient's mother who was at bedside and everything possible to be done for the patient was the instruction and given the patient's clinical status it was decided to intubate the patient and put on mechanical vent support, patient had large amounts of thick mucopurulent secretions in the posterior pharynx and also from the vocal cords at the time of intubation, patient also was started on IV pressors along with IV fluids, was having hypothermia and patient was started on a cooling blanket, patient was also having profound abdominal distention and pain, patient did not have any significant urine output in the Byrnes catheter which was inserted in the ER time of evaluation the ICU, no other pertinent review of system could be obtained           Patient Active Problem List    Diagnosis Date Noted    Severe sepsis ([address] 12/22/2022    Other secondary scoliosis, thoracolumbar region 12/22/2022    Lactic acidosis 12/22/2022    Pyuria 12/22/2022    Elevated alkaline phosphatase level 96/75/3443    Metabolic acidemia 51/50/5726    Elevated procalcitonin 12/22/2022    Bandemia 12/22/2022    SVT (supraventricular tachycardia) ([address] 12/22/2022    Hyperthyroidism 12/22/2022    Pneumonia 02/07/2016    HTN (hypertension) 02/07/2016    Spina bifida ([address] 02/07/2016       Past Medical History:   Diagnosis Date    Hypertension     Mental retardation     functions ~ age 14    Paraplegia ([address]     S/P  shunt     behind right ear    Scoliosis     Hard time laying flat    Spina bifida ([address]     Wheelchair bound    Strabismus     Thyroid disease     hyperthyroidism    Toxic multinodular goiter     Urinary incontinence     self caths QID    UTI (urinary tract infection)     self caths every 4 hours        Past Surgical History:   Procedure Laterality Date    BACK SURGERY      Rods for scoliosis    DENTAL SURGERY N/A 2/5/2020    DENTAL EXTRACTION # 19 performed by Malcolm Jorgensen DMD at [address] [place] [address] 9/12/2022    SURGICAL EXTRACTION OF TOOTH #8 performed by Malcolm Jorgensen DMD at [facility]      Multiple    VENTRICULOPERITONEAL SHUNT      WISDOM TOOTH EXTRACTION          Family History   Problem Relation Age of Onset    High Blood Pressure Mother     High Blood Pressure Father     Diabetes Father         Social History     Tobacco Use    Smoking status: Never    Smokeless tobacco: Never   Substance Use Topics    Alcohol use:  No No Known Allergies            Physical Exam:  Blood pressure (!) 49/37, pulse (!) 110, temperature (!) 103 °F (39.4 °C), temperature source Bladder, resp. rate 18, weight 59 lb (26.8 kg), SpO2 100 %, not currently breastfeeding.'     Constitutional: Profound respite distress with increased work of breathing with intermittent paradoxical breathing  HENT:  Oropharynx is clear and moist. No thyromegaly. Eyes:  Conjunctivae are normal. Pupils equal, round, and reactive to light. No scleral icterus. Neck: . No tracheal deviation present. No obvious thyroid mass. Cardiovascular: Profound sinus tachycardia normal heart sounds. No right ventricular heave. No lower extremity edema. Pulmonary/Chest: No wheezes. Scattered rales. Chest wall is not dull to percussion. Significant use of accessory muscle respiration  Abdominal: Profoundly distended and tender abdomen when seen  Musculoskeletal: No cyanosis. No clubbing. Spina bifida with scoliosis Patient has atrophic lower extremities  Lymphadenopathy: No cervical or supraclavicular adenopathy. Skin: Skin is warm and dry. No rash or nodules on the exposed extremities. Psychiatric: In pain and having increased anxiety prior to intubation  Neurologic: Alert and communicative prior to intubation        Results:  CBC:   Recent Labs     12/22/22  1445   WBC 19.9*   HGB 9.3*   HCT 31.6*   MCV 82.4        BMP:   Recent Labs     12/22/22  1445      K 3.6   CL 99   CO2 17*   BUN 14   CREATININE 0.8     LIVER PROFILE:   Recent Labs     12/22/22  1445   AST 37   ALT 16   BILITOT 0.4   ALKPHOS 248*       UA:  Recent Labs     12/22/22  1505   COLORU Yellow   PHUR 6.0   WBCUA >100*   RBCUA None seen   BACTERIA 4+*   CLARITYU CLOUDY*   SPECGRAV >=1.030   LEUKOCYTESUR MODERATE*   UROBILINOGEN 1.0   BILIRUBINUR Negative   BLOODU LARGE*   GLUCOSEU Negative       Imaging:  I have reviewed radiology images personally.     XR ABDOMEN (KUB) (SINGLE AP VIEW)   Final Result Enteric tube is in the stomach         XR CHEST PORTABLE   Final Result   Endotracheal tube is at the susanne         XR CHEST PORTABLE   Final Result   No radiographic evidence of acute pulmonary disease. CT ABDOMEN PELVIS WO CONTRAST Additional Contrast? None    (Results Pending)     XR CHEST PORTABLE    Result Date: 12/22/2022  EXAMINATION: ONE XRAY VIEW OF THE CHEST 12/22/2022 2:52 pm COMPARISON: Chest x-ray dated 02/13/2016. HISTORY: ORDERING SYSTEM PROVIDED HISTORY: #SOB TECHNOLOGIST PROVIDED HISTORY: Reason for exam:->#SOB Reason for Exam: sob FINDINGS: HEART/MEDIASTINUM: The cardiomediastinal silhouette is stable. PLEURA/LUNGS: There are no focal consolidations or pleural effusions. There is no appreciable pneumothorax. BONES/SOFT TISSUE: No acute abnormality. Unchanged thoracolumbar scoliosis with extensive postoperative changes. No radiographic evidence of acute pulmonary disease. Latest Reference Range & Units Most Recent   Lactic Acid, Sepsis 0.4 - 1.9 mmol/L 10.2 (HH)  12/22/22 14:45   Glucose, Random 70 - 99 mg/dL 137 (H)  12/22/22 14:45   CALCIUM, SERUM, 197207 8.3 - 10.6 mg/dL 8.7  12/22/22 14:45   Total Protein 6.4 - 8.2 g/dL 6.5  12/22/22 14:45   Procalcitonin 0.00 - 0.15 ng/mL 12.93 (H)  12/22/22 14:45   PROTEIN TOTAL 6.4 - 8.2 gm/dl 7.7  11/29/11 23:13   Vitamin B-12 211 - 911 pg/mL 427  6/24/21 16:03   Albumin 3.4 - 5.0 g/dL 2.7 (L)  12/22/22 14:45   Globulin g/dL 3.9  2/7/16 20:24   Albumin/Globulin Ratio 1.1 - 2.2  0.7 (L)  12/22/22 14:45   Alk Phos 40 - 129 U/L 248 (H)  12/22/22 14:45     EKG-Supraventricular tachycardiaRight superior axis deviationNonspecific ST abnormalityAbnormal ECGWhen compared with ECG of 11-FEB-2016 14:39,Vent.  rate has increased BY  62 BPM     Latest Reference Range & Units 12/22/22 14:45   INFLUENZA A NOT DETECTED  NOT DETECTED   INFLUENZA B NOT DETECTED  NOT DETECTED   SARS-CoV-2 RNA, RT PCR NOT DETECTED  NOT DETECTED     Echocardiogram:None on Epic   PFT:None in Epic      Latest Reference Range & Units 2/9/16 20:33 12/22/22 14:45   Hemoglobin, Art, Extended 12.0 - 16.0 g/dL 11.9 (L)    pH, Arterial 7.350 - 7.450  7.449    pCO2, Arterial 35.0 - 45.0 mmHg 48.7 (H)    pO2, Arterial 75.0 - 108.0 mmHg 67.6 (L)    HCO3, Arterial 21.0 - 29.0 mmol/L 33.0 (H)    TCO2 (calc), Art Not Established mmol/L 34.5    Base Excess, Arterial -3.0 - 3.0 mmol/L 7.9 (H)    O2 Sat, Arterial >92 % 93.8    O2 Content, Arterial Not Established mL/dL 16    Methemoglobin, Arterial <1.5 % 0.5    Carboxyhgb, Arterial 0.0 - 1.5 % 0.3    pH, Ming 7.350 - 7.450   7.359   pCO2, Ming 40.0 - 50.0 mmHg  26.5 (L)   pO2, Ming 25.0 - 40.0 mmHg  94.6 (H)   HCO3, Venous 23.0 - 29.0 mmol/L  14.6 (L)   TC02 (Calc), Ming Not Established mmol/L  15   Base Excess, Ming -3.0 - 3.0 mmol/L  -9.4 (L)   MetHgb, Ming <1.5 %  0.5   O2 Sat, Ming Not Established %  97       Assessment:  Principal Problem:    Severe sepsis ([facility])  Active Problems:    Other secondary scoliosis, thoracolumbar region    Lactic acidosis    Pyuria    Elevated alkaline phosphatase level    Metabolic acidemia    Elevated procalcitonin    Bandemia    SVT (supraventricular tachycardia) ([facility])    Hyperthyroidism    Spina bifida ([facility]  Resolved Problems:    * No resolved hospital problems.  *          Plan:   Admit to ICU  Patient's clinical status was rapidly deteriorating when evaluated in the ICU and after discussion with patient's family wanted everything to be done for the patient-patient was intubated and put on mechanical vent to support  Initial ventilator settings given to the RT  Titration of ventilator as per repeat ABG  Pulmonary toilet  Patient has been started on cefepime and vancomycin by the admitting provider which can be continued for now and titration as per clinical status and cultures  Patient has been given fluid boluses  Patient continues to be hypotensive and patient was started on IV pressors and titration of pressors as per mean arterial pressure more than 65 mmHg  IV sedation to maintain patient mental synchrony  Titration of sedation as per RASS scores  Patient's systolic blood pressure 1 point was 50 mmHg and emergent CVC was inserted with the caveat that once the PICC line is placed then present plan to be discontinued-discussed with nursing  Patient was started on bicarb drip and saline was discontinued  Patient does have history of hypothyroidism and patient also had intermittent SVT on this admission  Patient is getting Tapazole at this time- will send add-on thyroid profile to see if that is contributing to the patient pathology is may require patient to have addition of PTU/iodine  Patient has profound abdominal distention and patient was having significant pain prior to intubation and at 1 point a CT of the abdomen pelvis was ordered-nursing was requested to hold off as patient's clinical status is deteriorating and patient may not be stable to undergo the CT for now and will reassess in the morning to do the needful  Strict input over charting  Patient's renal function is to monitor closely as patient has potential for further decompensation and MAYTE secondary to ATN  Patient's WBC count and lactic acid to be trended  PUD and DVT prophylaxis      Patient's clinical status is precarious and critical and has poor potential for further decompensation with potential for non survival    Case was discussed with patient's mother and ICU team    Critical care time spent with patient was 45 minutes exclusive of any procedures    Patient's clinical status deteriorates further - consider palliative care consult to discuss the goals of care and CODE STATUS      Electronically signed by:  Nuno Pineda MD    12/22/2022    9:18 PM.

## 2022-12-22 NOTE — H&P
Hospital Medicine History & Physical      PCP: Maxime Carreon DO    Date of Admission: 12/22/2022    Date of Service: Pt seen/examined on 12/22/22 and Admitted to Inpatient with expected LOS greater than two midnights due to medical therapy. Chief Complaint:  AMS      History Of Present Illness:    49 y.o. female who presented to [facility] with AMS. Patient has a history of spinal bifida and mental retardation. She is wheel chair bound and her mother is her caregiver and decision maker. Per patient's mother, patient started seeming fatigued a few hours ago. This quickly progressed and patient started to seem somnolent. By the time she presented to the ED, patient was unarousable. She had a high fever and severe tachycardia. She regained consciousness once IVF were started. Patient has a neurogenic bladder and straight caths multiple times per day. She had evidence of UTI on a UA. Patient had good response to IVF but remains critically ill in ED.     Past Medical History:          Diagnosis Date    Hypertension     Mental retardation     functions ~ age 14    Paraplegia ([address]     S/P  shunt     behind right ear    Scoliosis     Hard time laying flat    Spina bifida ([address]     Wheelchair bound    Strabismus     Thyroid disease     hyperthyroidism    Toxic multinodular goiter     Urinary incontinence     self caths QID    UTI (urinary tract infection)     self caths every 4 hours       Past Surgical History:          Procedure Laterality Date    BACK SURGERY      Rods for scoliosis    DENTAL SURGERY N/A 2/5/2020    DENTAL EXTRACTION # 19 performed by Memo Deng DMD at [address] [place] [address] 9/12/2022    SURGICAL EXTRACTION OF TOOTH #8 performed by Memo Deng DMD at [facility]      Multiple    VENTRICULOPERITONEAL SHUNT      WISDOM TOOTH EXTRACTION         Medications Prior to Admission:      Prior to Admission medications    Medication Sig Start Date End Date Taking? Authorizing Provider   ferrous sulfate (IRON 325) 325 (65 Fe) MG tablet Take 325 mg by mouth daily (with breakfast)    Historical Provider, MD   hydrocortisone 0.5 % cream Apply topically as needed Apply topically 2 times daily. Historical Provider, MD   trimethoprim (TRIMPEX) 100 MG tablet Take 100 mg by mouth daily    Historical Provider, MD   methimazole (TAPAZOLE) 5 MG tablet Take 5 mg by mouth daily    Historical Provider, MD   acetaminophen (TYLENOL) 325 MG tablet Take 650 mg by mouth every 6 hours as needed for Pain    Historical Provider, MD   Cholecalciferol (VITAMIN D3) 2000 UNITS CAPS Take 1,000 Units by mouth daily    Historical Provider, MD   mineral oil-hydrophilic petrolatum (AQUAPHOR) ointment Apply topically daily     Historical Provider, MD   metoprolol (TOPROL-XL) 25 MG XL tablet Take 25 mg by mouth daily. Historical Provider, MD   oxybutynin (DITROPAN XL) 10 MG CR tablet Take 10 mg by mouth 2 times daily     Historical Provider, MD       Allergies:  Patient has no known allergies. Social History:      The patient currently lives at home    TOBACCO:   reports that she has never smoked. She has never used smokeless tobacco.  ETOH:   reports no history of alcohol use. E-cigarette/Vaping       Questions Responses    E-cigarette/Vaping Use     Start Date     Passive Exposure     Quit Date     Counseling Given     Comments               Family History:      Reviewed and negative in regards to presenting illness/complaint. Problem Relation Age of Onset    High Blood Pressure Mother     High Blood Pressure Father     Diabetes Father        REVIEW OF SYSTEMS COMPLETED:   Pertinent positives as noted in the HPI. All other systems reviewed and negative.     PHYSICAL EXAM PERFORMED:    BP 82/61   Pulse (!) 149   Temp (!) 103.8 °F (39.9 °C) (Bladder)   Resp (!) 31   Wt 59 lb (26.8 kg)   SpO2 100%   BMI 18.00 kg/m²     General appearance:  frail, small stature  HEENT:  Normal cephalic, atraumatic without obvious deformity. Pupils equal, round, and reactive to light. Extra ocular muscles intact. Conjunctivae/corneas clear. Neck: Supple, with full range of motion. No jugular venous distention. Trachea midline. Respiratory:  Normal respiratory effort. Clear to auscultation, bilaterally without Rales/Wheezes/Rhonchi. Cardiovascular:  tachycardia, regular rhythm with normal S1/S2 without murmurs, rubs or gallops. Abdomen: Soft, non-tender, non-distended with normal bowel sounds. Musculoskeletal:  No clubbing, cyanosis or edema bilaterally. Full range of motion without deformity. Chronic LE contractures, poor tone  Skin: Skin color, texture, turgor normal.  No rashes or lesions. Neurologic:  Neurovascularly intact without any focal sensory/motor deficits. Cranial nerves: II-XII intact, grossly non-focal.  Psychiatric:  Alert and oriented, limited insight  Capillary Refill: Brisk,3 seconds, normal  Peripheral Pulses: +2 palpable, equal bilaterally       Labs:     Recent Labs     12/22/22  1445   WBC 19.9*   HGB 9.3*   HCT 31.6*        Recent Labs     12/22/22  1445      K 3.6   CL 99   CO2 17*   BUN 14   CREATININE 0.8   CALCIUM 8.7     Recent Labs     12/22/22  1445   AST 37   ALT 16   BILITOT 0.4   ALKPHOS 248*     No results for input(s): INR in the last 72 hours. No results for input(s): Blaire Jona in the last 72 hours.     Urinalysis:      Lab Results   Component Value Date/Time    NITRU Negative 12/22/2022 03:05 PM    [address] >100 12/22/2022 03:05 PM    BACTERIA 4+ 12/22/2022 03:05 PM    RBCUA None seen 12/22/2022 03:05 PM    BLOODU LARGE 12/22/2022 03:05 PM    SPECGRAV >=1.030 12/22/2022 03:05 PM    GLUCOSEU Negative 12/22/2022 03:05 PM       Radiology:     CXR: I have reviewed the CXR with the following interpretation: no acute disease  EKG:  I have reviewed the EKG with the following interpretation: marked tachycardia    XR CHEST PORTABLE   Final Result   No radiographic evidence of acute pulmonary disease. Consults:    IP CONSULT TO HOSPITALIST  IP CONSULT TO CRITICAL CARE    ASSESSMENT:    Active Hospital Problems    Diagnosis Date Noted    Sepsis ([facility] [A41.9] 12/22/2022     Priority: Medium         PLAN:  Severe sepsis  - possible progression to shock soon  - presented with tachycardia, fever, leukocytosis. Lactate 10.2 on admission  - IVF bolus ordered  - suspect UTI based on UA  - blood, urine cultures ordered  - started on ceftriaxone    Acute metabolic encephalopathy  - 2/2 above  - supportive care    Lactic acidosis  - severe, 2/2 sepsis  - aggressive IVF ordered  - continue to monitor    Hyperthyroidism  - TSH ordered  - continue home methimazole    Anemia  - chronic, stable  - holding home Fe supplement  - continue to monitor    HTN  - hypotension on admission  - holding metoprolol    Spina Bifida  - paraplegic at baseline  - supportive care    DVT Prophylaxis: lovenox  Diet: general diet  Code Status: full code    PT/OT Eval Status: not ordered    Dispo - ICU       Wilfred Astudillo MD    Thank you Amilcar Crawford DO for the opportunity to be involved in this patient's care. If you have any questions or concerns please feel free to contact me at [phone].

## 2022-12-22 NOTE — CODE DOCUMENTATION
Pt presents to room with BVM ventilations in progress. Pt unresponsive to stimuli. Family reports pt went unresponsive in car en route to ED.

## 2022-12-22 NOTE — PROGRESS NOTES
Admit request for this patient sent via [employer] to hospitalist Jo-Ann Reese at [phone] hrs.  on 12/22/2022    Vicenta Bird MD

## 2022-12-23 PROBLEM — R91.8 PULMONARY INFILTRATES: Status: ACTIVE | Noted: 2022-01-01

## 2022-12-23 PROBLEM — E16.2 HYPOGLYCEMIA: Status: ACTIVE | Noted: 2022-01-01

## 2022-12-23 PROBLEM — B96.20 BACTEREMIA DUE TO ESCHERICHIA COLI: Status: ACTIVE | Noted: 2022-01-01

## 2022-12-23 PROBLEM — K72.00 SHOCK LIVER: Status: ACTIVE | Noted: 2022-01-01

## 2022-12-23 PROBLEM — R78.81 BACTEREMIA DUE TO ESCHERICHIA COLI: Status: ACTIVE | Noted: 2022-01-01

## 2022-12-23 NOTE — CONSULTS
[facility] Nurse  Consult Note       NAME:  Neena Toscano  MEDICAL RECORD NUMBER:  [id number]  AGE: 47 y.o.   GENDER: female  : 1975  TODAY'S DATE:  2022    Subjective;patient intubated.   Parent at bed side.  Showed wound care what they used at [place].  ( Firacol Plus  collagen wound drsg)  Wound Care explained we do not carry same product but have collagen product called Santyl.  Parent, mother, agreeable.   Reason for WOCN Evaluation and Assessment: sacrum      Neena Toscano is a 47 y.o. female referred by:   [] Physician  [x] Nursing  [] Other:     Wound Identification:stage 3  Wound Type: pressure  Contributing Factors: HTN, paraplegia, spina bifida    Wound History: 47 y.o. female who presented to [facility] with AMS. Patient has a history of spinal bifida and mental retardation. She is wheel chair bound and her mother is her caregiver and decision maker.  Per partient spent time at [facility].  Current Wound Care Treatment:  dry drsg    Patient Goal of Care:  [x] Wound Healing  [] Odor Control  [] Palliative Care  [x] Pain Control   [] Other:         PAST MEDICAL HISTORY        Diagnosis Date    Hypertension     Mental retardation     functions ~ age 11    Paraplegia (HCC)     S/P  shunt     behind right ear    Scoliosis     Hard time laying flat    Spina bifida (HCC)     Wheelchair bound    Strabismus     Thyroid disease     hyperthyroidism    Toxic multinodular goiter     Urinary incontinence     self caths QID    UTI (urinary tract infection)     self caths every 4 hours       PAST SURGICAL HISTORY    Past Surgical History:   Procedure Laterality Date    BACK SURGERY      Rods for scoliosis    DENTAL SURGERY N/A 2020    DENTAL EXTRACTION # 19 performed by Igor Roberts DMD at [facility] OR    DENTAL SURGERY N/A 2022    SURGICAL EXTRACTION OF TOOTH #8 performed by Igor Roberts DMD at [facility] OR    LEG SURGERY      Multiple    VENTRICULOPERITONEAL SHUNT  WISDOM TOOTH EXTRACTION         FAMILY HISTORY    Family History   Problem Relation Age of Onset    High Blood Pressure Mother     High Blood Pressure Father     Diabetes Father        SOCIAL HISTORY    Social History     Tobacco Use    Smoking status: Never    Smokeless tobacco: Never   Substance Use Topics    Alcohol use: No    Drug use: No       ALLERGIES    No Known Allergies    MEDICATIONS    No current facility-administered medications on file prior to encounter. Current Outpatient Medications on File Prior to Encounter   Medication Sig Dispense Refill    ferrous sulfate (IRON 325) 325 (65 Fe) MG tablet Take 325 mg by mouth daily (with breakfast)      hydrocortisone 0.5 % cream Apply topically as needed Apply topically 2 times daily. trimethoprim (TRIMPEX) 100 MG tablet Take 100 mg by mouth daily      methimazole (TAPAZOLE) 5 MG tablet Take 5 mg by mouth daily      acetaminophen (TYLENOL) 325 MG tablet Take 650 mg by mouth every 6 hours as needed for Pain      Cholecalciferol (VITAMIN D3) 2000 UNITS CAPS Take 1,000 Units by mouth daily      mineral oil-hydrophilic petrolatum (AQUAPHOR) ointment Apply topically daily       metoprolol (TOPROL-XL) 25 MG XL tablet Take 25 mg by mouth daily. oxybutynin (DITROPAN XL) 10 MG CR tablet Take 10 mg by mouth 2 times daily          Objective;  Intubated, multiple lines,    /84   Pulse 87   Temp (P) 99.1 °F (37.3 °C)   Resp 16   Ht 4' (1.219 m)   Wt 59 lb (26.8 kg)   SpO2 96%   BMI 18.00 kg/m²     LABS:  WBC:    Lab Results   Component Value Date/Time    WBC 32.4 12/23/2022 06:27 AM     H/H:    Lab Results   Component Value Date/Time    HGB 8.5 12/23/2022 06:27 AM    HCT 28.3 12/23/2022 06:27 AM     PTT:  No results found for: APTT, PTT[APTT}  PT/INR:  No results found for: PROTIME, INR  HgBA1c:  No results found for: LABA1C    Assessment; eschar & slough to sacrum   Giles Risk Score: Giles Scale Score: 10    Patient Active Problem List Diagnosis    Pneumonia    HTN (hypertension)    Spina bifida ([facility] [address]    Severe sepsis ([facility] [address]    Other secondary scoliosis, thoracolumbar region    Lactic acidosis    Pyuria    Elevated alkaline phosphatase level    Metabolic acidemia    Elevated procalcitonin    Bandemia    SVT (supraventricular tachycardia) ([facility] [address]    Hyperthyroidism       Measurements:  Wound 12/22/22 Sacrum Mid (Active)   Dressing Status New dressing applied 12/23/22 1300   Wound Cleansed Not Cleansed 12/23/22 1300   Wound Assessment Erythema 12/23/22 1300   Drainage Amount None 12/23/22 1300   Odor None 12/23/22 1300   Margins Unattached edges 12/23/22 1300   Number of days: 0       Wound 12/22/22 Sacrum Stage 2 (Active)   Wound Image   12/23/22 1555   Wound Etiology Pressure Stage 3 12/23/22 1555   Dressing Status New dressing applied 12/23/22 1300   Wound Cleansed Not Cleansed 12/23/22 1300   Dressing/Treatment Pharmaceutical agent (see MAR) 12/23/22 1555   Offloading for Diabetic Foot Ulcers Offloading ordered 12/23/22 1555   Dressing Change Due 12/23/22 12/23/22 1555   Wound Length (cm) 2.5 cm 12/23/22 1555   Wound Width (cm) 2 cm 12/23/22 1555   Wound Depth (cm) 0.2 cm 12/23/22 1555   Wound Surface Area (cm^2) 5 cm^2 12/23/22 1555   Wound Volume (cm^3) 1 cm^3 12/23/22 1555   Distance Tunneling (cm) 0 cm 12/23/22 1555   Tunneling Position ___ O'Clock 0 12/23/22 1555   Undermining Starts ___ O'Clock 0 12/23/22 1555   Undermining Ends___ O'Clock 0 12/23/22 1555   Undermining Maxium Distance (cm) 0 12/23/22 1555   Wound Assessment Eschar dry 12/23/22 1555   Drainage Amount None 12/23/22 1555   Odor None 12/23/22 1555   Corine-wound Assessment Blanchable erythema 12/23/22 1555   Margins Unattached edges 12/23/22 1555   Wound Thickness Description not for Pressure Injury Full thickness 12/23/22 1555   Number of days: 0         Wound 12/22/22 Toe (Comment  which one) Anterior;Right (Active)   Number of days: 0     Incision 09/12/22 Mouth (Active) Number of days: 102         Response to treatment:  Well tolerated by patient. Pain Assessment:  Severity:  0 / 10  Quality of pain: N/A  Wound Pain Timing/Severity: none  Premedicated: No    Plan   Plan of Care: Wound 12/22/22 Sacrum Stage 2-Dressing/Treatment: Pharmaceutical agent (see MAR) (santyl)    Recommend  Daily cleanse with bathwipe or normal saline, pat dry the sacrum area. Apply Santyl nickel thick, cover with dry dressing and medipore tape or tegaderm.     Specialty Bed Required : Yes   [x] Low Air Loss   [x] Pressure Redistribution  [] Fluid Immersion  [] Bariatric  [] Total Pressure Relief  [] Other:     Current Diet: ADULT TUBE FEEDING; Orogastric; Renal Formula; Continuous; 20; Yes; 0; Other (specify); trophic; 20; 30; Q 4 hours  Dietician consult:  Yes    Discharge Plan:  Placement for patient upon discharge: skilled nursing    Patient appropriate for Outpatient [address] [facility] [address]: Yes    Referrals:  [x]  / discharge planner  following  [] [facility]  [] Supplies  [] Other    Patient/Caregiver Teaching:Parent understands treatment  Level of patient/caregiver understanding able to:   [] Indicates understanding       [] Needs reinforcement  [] Unsuccessful      [x] Verbal Understanding  [] Demonstrated understanding       [] No evidence of learning  [] Refused teaching         [] N/A       Electronically signed by Mina Lenz RN, BSN on 12/23/2022 at 3:59 PM

## 2022-12-23 NOTE — PROGRESS NOTES
12/22/22 2341   Patient Observation   Heart Rate 97   Resp 18   Breath Sounds   Right Upper Lobe Clear   Right Middle Lobe Diminished   Right Lower Lobe Diminished   Left Upper Lobe Clear   Left Lower Lobe Diminished   Vent Information   Vent Mode AC/VC   Ventilator Settings   Vt (Set, mL) 400 mL   Resp Rate (Set) 16 bmp   PEEP/CPAP (cmH2O) 5   FiO2  40 %   Pressure Support (cm H2O) 0 cm H2O   Vent Patient Data (Readings)   Vt (Measured) 384 mL   Peak Inspiratory Pressure (cmH2O) 36 cmH2O   Rate Measured 24 br/min   Minute Volume (L/min) 9.94 Liters   Mean Airway Pressure (cmH2O) 16 cmH20   I:E Ratio 1.30:1   Flow Sensitivity 3 L/min   Backup Apnea On   Vent Alarm Settings   High Pressure (cmH2O) 40 cmH2O   Low Minute Volume (lpm) 3 L/min   Low Exhaled Vt (ml) 200 mL   RR High (bpm) 40 br/min   Apnea (secs) 20 secs   ETT    Placement Date: 12/22/22   Present on Admission/Arrival: No  Placed By: Licensed provider  Placement Verified By: Auscultation;Colorimetric ETCO2 device; Chest X-ray  Preoxygenation: Yes  Mask Ventilation: Ventilated by mask (1)  Airway Type: Cuffed  A...    Secured At 22 cm   Measured From Lips   ETT Placement Center   Secured By Commercial tube wayne   Site Assessment Dry   Cuff Pressure 32 cm H2O

## 2022-12-23 NOTE — ED PROVIDER NOTES
[facility] Emergency Department      CHIEF COMPLAINT  Altered Mental Status      HISTORY OF PRESENT ILLNESS  Konrad Rose is a 49 y.o. female with a history of spina bifida and paraplegia with a  shunt in place who also self caths throughout the day presents with altered mental status. Her mom states that she got home from work today and the patient was somnolent and difficult to arouse. She put her in the car to bring her here and on route here she became completely unresponsive. She states she has had a little cough recently and has a history of recurrent UTIs. .   No other complaints, modifying factors or associated symptoms. I have reviewed the following from the nursing documentation.     Past Medical History:   Diagnosis Date    Hypertension     Mental retardation     functions ~ age 14    Paraplegia ([address]     S/P  shunt     behind right ear    Scoliosis     Hard time laying flat    Spina bifida ([address]     Wheelchair bound    Strabismus     Thyroid disease     hyperthyroidism    Toxic multinodular goiter     Urinary incontinence     self caths QID    UTI (urinary tract infection)     self caths every 4 hours     Past Surgical History:   Procedure Laterality Date    BACK SURGERY      Rods for scoliosis    DENTAL SURGERY N/A 2/5/2020    DENTAL EXTRACTION # 19 performed by Latia Bello DMD at [address] [place] [address] 9/12/2022    SURGICAL EXTRACTION OF TOOTH #8 performed by Latia Bello DMD at [facility]      Multiple    VENTRICULOPERITONEAL SHUNT      WISDOM TOOTH EXTRACTION       Family History   Problem Relation Age of Onset    High Blood Pressure Mother     High Blood Pressure Father     Diabetes Father      Social History     Socioeconomic History    Marital status: Single     Spouse name: Not on file    Number of children: Not on file    Years of education: Not on file    Highest education level: Not on file   Occupational History    Not on file Tobacco Use    Smoking status: Never    Smokeless tobacco: Never   Substance and Sexual Activity    Alcohol use: No    Drug use: No    Sexual activity: Never   Other Topics Concern    Not on file   Social History Narrative    Not on file     Social Determinants of Health     Financial Resource Strain: Not on file   Food Insecurity: Not on file   Transportation Needs: Not on file   Physical Activity: Not on file   Stress: Not on file   Social Connections: Not on file   Intimate Partner Violence: Not on file   Housing Stability: Not on file     Current Facility-Administered Medications   Medication Dose Route Frequency Provider Last Rate Last Admin    methIMAzole (TAPAZOLE) tablet 5 mg  5 mg Oral Daily Keyana Davila MD   5 mg at 12/22/22 2115    sodium chloride flush 0.9 % injection 5-40 mL  5-40 mL IntraVENous 2 times per day Keyana Davila MD   10 mL at 12/22/22 2103    sodium chloride flush 0.9 % injection 5-40 mL  5-40 mL IntraVENous PRN Keyana Davila MD        0.9 % sodium chloride infusion   IntraVENous PRN Keyana Davila MD        enoxaparin Sodium (LOVENOX) injection 30 mg  30 mg SubCUTAneous Daily Keyana Davila MD        acetaminophen (TYLENOL) tablet 650 mg  650 mg Oral Q6H PRN Keyana Davila MD        Or    acetaminophen (TYLENOL) suppository 650 mg  650 mg Rectal Q6H PRN Keyana Davila MD        [facility] ON 12/23/2022] cefTRIAXone (ROCEPHIN) 1,000 mg in dextrose 5 % 50 mL IVPB mini-bag  1,000 mg IntraVENous Q24H Keyana Davila MD        propofol 1000 MG/100ML injection             dexmedetomidine (PRECEDEX) 400 mcg in sodium chloride 0.9 % 100 mL infusion  0.1-1.5 mcg/kg/hr IntraVENous Continuous Theresa Rivera MD 2.7 mL/hr at 12/22/22 2010 0.4 mcg/kg/hr at 12/22/22 2010    fentaNYL (SUBLIMAZE) 1,000 mcg in sodium chloride 0.9% 100 mL infusion   mcg/hr IntraVENous Continuous Theresa Rivera MD 2.5 mL/hr at 12/22/22 2019 25 mcg/hr at 12/22/22 2019    lidocaine 1 % injection 5 mL  5 mL IntraDERmal Once Umesh Mott MD        sodium chloride flush 0.9 % injection 5-40 mL  5-40 mL IntraVENous 2 times per day Umesh Mott MD   10 mL at 12/22/22 2102    sodium chloride flush 0.9 % injection 5-40 mL  5-40 mL IntraVENous PRN Umesh Mott MD        0.9 % sodium chloride infusion  25 mL IntraVENous PRN Umesh Mott MD        norepinephrine (LEVOPHED) 16 mg in dextrose 5 % 250 mL infusion  1-100 mcg/min IntraVENous Continuous Umesh Mott MD 32.8 mL/hr at 12/22/22 2118 35 mcg/min at 12/22/22 2118    sodium bicarbonate 75 mEq in sodium chloride 0.45 % 1,075 mL infusion   IntraVENous Continuous Umesh Mott MD        Eunice Janeth ON 12/23/2022] pantoprazole (PROTONIX) injection 40 mg  40 mg IntraVENous Daily Umesh Mott MD         No Known Allergies    REVIEW OF SYSTEMS  Limited secondary to altered mental status on arrival      Unless otherwise stated in this report, this patient's positive and negative responses for review of systems (constitutional, eyes, ENT, cardiovascular, respiratory, gastrointestinal, neurological, genitourinary, musculoskeletal, integument systems and systems related to the presenting problem) are either stated in the preceding paragraph, were not pertinent or were negative for the symptoms and/or complaints related to the medical problem. PHYSICAL EXAM  BP (!) 49/37   Pulse (!) 110   Temp (!) 103 °F (39.4 °C) (Bladder)   Resp 18   Wt 59 lb (26.8 kg)   SpO2 100%   BMI 18.00 kg/m²   GENERAL APPEARANCE: Somnolent on arrival, difficult to arouse. Frail-appearing. HEAD: Normocephalic. Atraumatic. EYES: PERRL. EOM's grossly intact. ENT: Mucous membranes are dry. NECK: Supple, trachea midline. HEART: Tachycardic, normal rhythm. LUNGS: Shallow respirations on arrival.  Lungs clear bilaterally to auscultation. ABDOMEN: Soft. Non-distended. Non-tender. No guarding or rebound. EXTREMITIES: Chronic deformities of the lower extremities at baseline. SKIN: Warm, dry and intact. No acute rashes. NEUROLOGICAL: Initially somnolent, difficult to arouse. Paraplegic the sensation level at T12. PSYCHIATRIC: Somnolent on arrival    LABS  I have reviewed all labs for this visit.    Results for orders placed or performed during the hospital encounter of 12/22/22   COVID-19 & Influenza Combo    Specimen: Nasopharyngeal Swab   Result Value Ref Range    SARS-CoV-2 RNA, RT PCR NOT DETECTED NOT DETECTED    INFLUENZA A NOT DETECTED NOT DETECTED    INFLUENZA B NOT DETECTED NOT DETECTED   Lactate, Sepsis   Result Value Ref Range    Lactic Acid, Sepsis 10.2 (HH) 0.4 - 1.9 mmol/L   Urinalysis   Result Value Ref Range    Color, UA Yellow Straw/Yellow    Clarity, UA CLOUDY (A) Clear    Glucose, Ur Negative Negative mg/dL    Bilirubin Urine Negative Negative    Ketones, Urine Negative Negative mg/dL    Specific Gravity, UA >=1.030 1.005 - 1.030    Blood, Urine LARGE (A) Negative    pH, UA 6.0 5.0 - 8.0    Protein, UA 30 (A) Negative mg/dL    Urobilinogen, Urine 1.0 <2.0 E.U./dL    Nitrite, Urine Negative Negative    Leukocyte Esterase, Urine MODERATE (A) Negative    Microscopic Examination YES     Urine Type NotGiven    Blood gas, venous   Result Value Ref Range    pH, Ming 7.359 7.350 - 7.450    pCO2, Ming 26.5 (L) 40.0 - 50.0 mmHg    pO2, Ming 94.6 (H) 25.0 - 40.0 mmHg    HCO3, Venous 14.6 (L) 23.0 - 29.0 mmol/L    Base Excess, Ming -9.4 (L) -3.0 - 3.0 mmol/L    O2 Sat, Ming 97 Not Established %    Carboxyhemoglobin 4.6 (H) 0.0 - 1.5 %    MetHgb, Ming 0.5 <1.5 %    TC02 (Calc), Ming 15 Not Established mmol/L    O2 Therapy Unknown    CBC with Auto Differential   Result Value Ref Range    WBC 19.9 (H) 4.0 - 11.0 K/uL    RBC 3.84 (L) 4.00 - 5.20 M/uL    Hemoglobin 9.3 (L) 12.0 - 16.0 g/dL    Hematocrit 31.6 (L) 36.0 - 48.0 %    MCV 82.4 80.0 - 100.0 fL    MCH 24.3 (L) 26.0 - 34.0 pg    MCHC 29.6 (L) 31.0 - 36.0 g/dL    RDW 15.3 12.4 - 15.4 %    Platelets 278 055 - 793 K/uL    MPV 7.6 5.0 - 10.5 fL    PLATELET SLIDE REVIEW Adequate     SLIDE REVIEW see below     Neutrophils % 25.0 %    Lymphocytes % 11.0 %    Monocytes % 0.0 %    Eosinophils % 1.0 %    Basophils % 1.0 %    Neutrophils Absolute 17.3 (H) 1.7 - 7.7 K/uL    Lymphocytes Absolute 2.2 1.0 - 5.1 K/uL    Monocytes Absolute 0.0 0.0 - 1.3 K/uL    Eosinophils Absolute 0.2 0.0 - 0.6 K/uL    Basophils Absolute 0.2 0.0 - 0.2 K/uL    Bands Relative 62 (H) 0 - 7 %    Vacuolated Neutrophils Present (A)     Anisocytosis Occasional (A)     Poikilocytes Occasional (A)     Schistocytes Occasional (A)    Comprehensive Metabolic Panel w/ Reflex to MG   Result Value Ref Range    Sodium 137 136 - 145 mmol/L    Potassium reflex Magnesium 3.6 3.5 - 5.1 mmol/L    Chloride 99 99 - 110 mmol/L    CO2 17 (L) 21 - 32 mmol/L    Anion Gap 21 (H) 3 - 16    Glucose 137 (H) 70 - 99 mg/dL    BUN 14 7 - 20 mg/dL    Creatinine 0.8 0.6 - 1.1 mg/dL    Est, Glom Filt Rate >60 >60    Calcium 8.7 8.3 - 10.6 mg/dL    Total Protein 6.5 6.4 - 8.2 g/dL    Albumin 2.7 (L) 3.4 - 5.0 g/dL    Albumin/Globulin Ratio 0.7 (L) 1.1 - 2.2    Total Bilirubin 0.4 0.0 - 1.0 mg/dL    Alkaline Phosphatase 248 (H) 40 - 129 U/L    ALT 16 10 - 40 U/L    AST 37 15 - 37 U/L   Procalcitonin   Result Value Ref Range    Procalcitonin 12.93 (H) 0.00 - 0.15 ng/mL   Microscopic Urinalysis   Result Value Ref Range    WBC, UA >100 (A) 0 - 5 /HPF    RBC, UA None seen 0 - 4 /HPF    Epithelial Cells, UA 6-10 (A) 0 - 5 /HPF    Bacteria, UA 4+ (A) None Seen /HPF   Lactate, Sepsis   Result Value Ref Range    Lactic Acid, Sepsis 5.4 (HH) 0.4 - 1.9 mmol/L   EKG 12 Lead   Result Value Ref Range    Ventricular Rate 186 BPM    Atrial Rate 186 BPM    QRS Duration 62 ms    Q-T Interval 262 ms    QTc Calculation (Bazett) 461 ms    R Axis 262 degrees    T Axis 68 degrees    Diagnosis       Supraventricular tachycardiaRight superior axis deviationNonspecific ST abnormalityAbnormal ECGWhen compared with ECG of 11-FEB-2016 14:39,Vent.  rate has increased BY 62 BPM       EKG  The Ekg interpreted by myself  sinus tachycardia, iqon=322  with a rate of 186  Axis is   Right axis deviation  QTc is  normal  Intervals and Durations are unremarkable. No specific ST-T wave changes appreciated. No evidence of acute ischemia. No significant change from prior EKG dated February 11, 2016    Cardiac Monitoring: The cardiac monitor revealed sinus tachycardia as interpreted by me. The cardiac monitor was ordered secondary to the patient's complaint of sepsis and to monitor the patient for dysrhythmia. RADIOLOGY  X-RAYS: ALL IMAGES INCLUDING PLAIN FILMS, CT, ULTRASOUND AND MRI HAVE BEEN READ BY THE RADIOLOGIST. I have personally reviewed plain film images and have reviewed the radiology reports. XR ABDOMEN (KUB) (SINGLE AP VIEW)   Final Result   Enteric tube is in the stomach         XR CHEST PORTABLE   Final Result   Endotracheal tube is at the susanne         XR CHEST PORTABLE   Final Result   No radiographic evidence of acute pulmonary disease. CT ABDOMEN PELVIS WO CONTRAST Additional Contrast? None    (Results Pending)              Rechecks: Physical assessment performed. Just prior to RSI the patient started talking to us and was more alert. She is satting in the low 90s on nasal cannula oxygen. She and her mother have been updated on her vital signs and lab work findings. Initially was hypotensive which is responding to IV fluids. She has been updated that she will be admitted to the ICU for treatment of sepsis. Critical Care:I personally spent a total of 50 minutes of critical care time in obtaining history, performing a physical exam, bedside monitoring of interventions, collecting and interpreting tests and discussion with consultants but excluding time spent performing procedures, treating other patients and teaching time.                                                                                                             Sepsis:  Is this patient to be included in the SEP-1 Core Measure due to severe sepsis or septic shock? Yes   SEP-1 CORE MEASURE DATA      Sepsis Criteria   Severe Sepsis Criteria   Septic Shock Criteria     Must be confirmed or suspected to move forward with diagnosis of sepsis. Must meet 2:    [x] Temperature > 100.9 F (38.3 C)        or < 96.8 F (36 C)  [x] HR > 90  [] RR > 20  [] WBC > 12 or < 4 or 10% bands      AND:      [x] Infection Confirmed or        Suspected. Must meet 1:    [] Lactate > 2       or   [] Signs of Organ Dysfunction:    - SBP < 90 or MAP < 65  - Altered mental status  - Creatinine > 2 or increased from      baseline  - Urine Output < 0.5 ml/kg/hr  - Bilirubin > 2  - INR > 1.5 (not anticoagulated)  - Platelets < 631,196  - Acute Respiratory Failure as     evidenced by new need for NIPPV     or mechanical ventilation      [] No criteria met for Severe Sepsis. Must meet 1:    [x] Lactate > 4        or   [] SBP < 90 or MAP < 65 for at        least two readings in the first        hour after fluid bolus        administration      [] Vasopressors initiated (if hypotension persists after fluid resuscitation)        [] No criteria met for Septic Shock.    Patient Vitals for the past 6 hrs:   BP Temp Pulse Resp SpO2   12/22/22 1700 85/67 -- (!) 138 (!) 31 94 %   12/22/22 1703 84/75 -- (!) 152 30 99 %   12/22/22 1706 82/61 -- (!) 149 (!) 31 100 %   12/22/22 1753 87/66 (!) 103 °F (39.4 °C) (!) 145 (!) 31 100 %   12/22/22 1922 (!) 52/35 -- (!) 110 16 100 %   12/22/22 1925 (!) 49/37 -- (!) 104 16 100 %   12/22/22 2051 -- -- (!) 110 18 --      Recent Labs     12/22/22  1445   WBC 19.9*   CREATININE 0.8   BILITOT 0.4            Time Septic Shock Identified: [id number]    Fluid Resuscitation Rational: at least 30mL/kg based on entered actual weight at time of triage    Repeat lactate level: ordered and pending at this time    Reassessment Exam:   I have reassessed tissue perfusion and hemodynamic status after fluid bolus at this time: 1633            ED COURSE/MDM  Patient seen and evaluated. Here the patient is febrile and tachycardic, hypoxic on arrival.  Is actually extremely somnolent on arrival we cannot arouse her. Blood glucose was normal and she was given IV Narcan with no change in her mental status. We were actually preparing for RSI and were assisting ventilations when she suddenly woke up and started talking to us. She was placed on nasal cannula and was oxygenating with sats in the low 90s. She remained tachycardic. She is febrile and I suspect sepsis. Byrnes catheter was placed, temp sensing and her temp was 104. She was given Tylenol and Motrin. She has started to defervesce. She was given a 30 cc/kg fluid bolus. Initially was mildly hypotensive but she is quite small only weighing 26 kg. Blood pressure was improving. She is awake and alert, mentating well. Chest x-ray is unremarkable. She does have a UTI. Lactic acid is 10. COVID and flu are negative. VBG shows no acidosis she has a leukocytosis as well. She has been given broad-spectrum antibiotics. Blood pressure started to drop slightly after her initial fluid bolus was done so we did give her a second fluid bolus and again her blood pressure is responding. .  I considered central line and pressors however she is responding to IV fluids and does look fluid depleted and I think it is appropriate to continue with IV fluids. She continues to be awake and alert, mentating well. Old records reviewed. Labs and imaging reviewed and results discussed with patient. Patient was reassessed as noted above . Plan of care discussed with patient. Patient in agreement with plan. CLINICAL IMPRESSION  1. Septic shock ([address]    2. Acute cystitis with hematuria        Blood pressure (!) 49/37, pulse (!) 110, temperature (!) 103 °F (39.4 °C), temperature source Bladder, resp.  rate 18, weight 59 lb (26.8 kg), SpO2 100 %, not currently breastfeeding. Astrid Bella was admitted in stable condition. Adam San MD am the primary clinician of record.     (Please note this note was completed with a voice recognition program.  Efforts were made to edit the dictations but occasionally words are mis-transcribed.)       Regino Amaya MD  12/22/22 8732

## 2022-12-23 NOTE — PROGRESS NOTES
12/23/22 0830   Patient Observation   Heart Rate 93   Resp 18   SpO2 96 %   Additional Respiratoray Assessments   Humidification Source HME   Ambu Bag With Mask At Bedside Yes

## 2022-12-23 NOTE — PROGRESS NOTES
Physician Progress Note      PATIENTMartínez Salamanca  CSN #:                  [id number]  :                       1975  ADMIT DATE:       2022 2:41 PM  [address] [place] DATE:  RESPONDING  PROVIDER #:        Paula Bonner MD          QUERY TEXT:    Pt admitted with sepsis. Pt noted to have need for intermittent self   catheterizations for neurogenic bladder. If possible, please document in the   progress notes and discharge summary if you are evaluating and / or treating   any of the following: The medical record reflects the following:  Risk Factors: neurogenic bladder  Clinical Indicators: per H&P- \" Patient has a neurogenic bladder and straight   caths multiple times per day. She had evidence of UTI on a UA\"  Treatment: Septic IVF's, ABX, mechanical ventilation, supportive ICU monitored   care    Thank you,  Gisselle Tracy RN, BSN  [email or web address]  Options provided:  -- Sepsis related to intermittent straight catheterizations  -- Sepsis unrelated to intermittent straight catheterization  -- Other - I will add my own diagnosis  -- Disagree - Not applicable / Not valid  -- Disagree - Clinically unable to determine / Unknown  -- Refer to Clinical Documentation Reviewer    PROVIDER RESPONSE TEXT:    This patient has sepsis related to Sepsis related to intermittent straight   catheterization.     Query created by: Deisy Rangel on 2022 9:14 AM      Electronically signed by:  Paula Bonner MD 2022 11:46 AM

## 2022-12-23 NOTE — CARE COORDINATION
CM attempted to reach patient's mother for completion of initial assessment. No answer. VM left for call back. Per chart review, pt with h/o spinal bifida and mental retardation. Pt is wheelchair bound. Mother is caregiver. pt currently admitted to ICU, critically ill. Vented on precedex, levophed, fentanyl and bicarb gtts. Also IV abx. CM team will follow for needs. Pending call back from mother.

## 2022-12-23 NOTE — PROCEDURES
Central Venous Catheter Insertion Procedure Note    Consent: Emergent procedure    Indication:  Hypotension, Shock, Limited Access     Procedure:        A time out was performed. Patient positioned then site prepared with chlorhexidine, draped. Sterile technique was utilized including gown, mask, drape and gloves. Lidocaine was administered via 25 gauge needle. 18 gauge needle inserted with access of rightfemoral vein . Wire inserted to 16cm . Small incision made in skin, dilator inserted over wire then removed. Triple lumen inserted to 16 cm, secured and sterile dressing applied. Blood return from all 3 lumens confirmed. Complications:  None  Distal port of the central line is not drawing blood which was discussed with nursing    There were no changes to vital signs. Patient tolerated procedure well.     EBL <10 ml    The procedure was done on emergent basis because patient systolic blood pressure was 50 mmHg and prior to that a PICC line has been ordered for the patient and nursing was requested to that once the PICC line is placed in the central line which will be discontinued    Hernan Hamlin MD

## 2022-12-23 NOTE — PROGRESS NOTES
INPATIENT PULMONARY CRITICAL CARE PROGRESS NOTE      Reason for visit    severe sepsis    SUBJECTIVE: Patient when seen this morning continues to be critically ill on mechanical ventilatory support, patient continues to be on IV pressors to maintain hemodynamics, patient continues to be on IV sedation to maintain patient mental synchrony, patient had a T-max of 102.6 °F, patient has requirement for 40% oxygen and PEEP of 5 to maintain oxygen saturation, patient had hypoglycemia today, patient urine output is on the lower side, patient has a cumulative fluid balance of +5.1 L, no other pertinent review of system could be obtained       Physical Exam:  Blood pressure 97/70, pulse 93, temperature (P) 99.1 °F (37.3 °C), resp. rate 16, height 4' (1.219 m), weight 59 lb (26.8 kg), SpO2 96 %, not currently breastfeeding.'     Constitutional: No profound respiratory distress on mechanical vent to support  HENT: Endotracheal tube present. No thyromegaly. Eyes:  Conjunctivae are normal. Pupils equal, round, and reactive to light. No scleral icterus. Neck: . No tracheal deviation present. No obvious thyroid mass. Cardiovascular: Sinus rhythm. No lower extremity edema. Pulmonary/Chest: No wheezes. Scattered rales. Chest wall is not dull to percussion. No use of accessory muscle respiration  Abdominal: Profoundly distended a abdomen when seen  Musculoskeletal: No cyanosis. No clubbing. Spina bifida with scoliosis Patient has atrophic lower extremities  Lymphadenopathy: No cervical or supraclavicular adenopathy.    Skin: Mottling of the skin especially in the distal aspect of lower extremities, sacral decub  Neurologic intubated and sedated      Results:  CBC:   Recent Labs     12/22/22  1445 12/23/22 0627   WBC 19.9* 32.4*   HGB 9.3* 8.5*   HCT 31.6* 28.3*   MCV 82.4 81.1    124*     BMP:   Recent Labs     12/22/22  1445 12/23/22 0627    138   K 3.6 4.2   CL 99 100   CO2 17* 13*   BUN 14 21*   CREATININE 0.8 1.0     LIVER PROFILE:   Recent Labs     12/22/22  1445 12/23/22  0627   AST 37 3,162*   ALT 16 962*   BILITOT 0.4 0.4   ALKPHOS 248* 90     PT/INR: No results for input(s): PROTIME, INR in the last 72 hours. APTT: No results for input(s): APTT in the last 72 hours. UA:  Recent Labs     12/22/22  1505   COLORU Yellow   PHUR 6.0   WBCUA >100*   RBCUA None seen   BACTERIA 4+*   CLARITYU CLOUDY*   SPECGRAV >=1.030   LEUKOCYTESUR MODERATE*   UROBILINOGEN 1.0   BILIRUBINUR Negative   BLOODU LARGE*   GLUCOSEU Negative       Imaging:  I have reviewed radiology images personally. CT ABDOMEN PELVIS WO CONTRAST Additional Contrast? None   Final Result   1. Technically limited CT due to the patient's underlying medical condition   and artifact from positioning, hardware and lack of IV contrast.   2. Probable sacral decubitus ulcer for which correlation with physical exam   is advised. 3. Questionable inflammation at the left hip joint which may simply be due to   the patient's underlying medical condition and stasis. Osteoarthritis or   septic pattern of arthritis cannot be definitively excluded. 4. Heterogeneity of the right kidney that is difficult to evaluate. An   underlying pattern of inflammation/pyelonephritis may be considered in the   appropriate clinical setting. No obvious sign of obstructive uropathy. 5. Bibasilar pleural fluid and airspace changes. Correlate with pulmonary   symptoms. XR ABDOMEN (KUB) (SINGLE AP VIEW)   Final Result   Enteric tube is in the stomach         XR CHEST PORTABLE   Final Result   Endotracheal tube is at the susanne         XR CHEST PORTABLE   Final Result   No radiographic evidence of acute pulmonary disease. XR ABDOMEN (KUB) (SINGLE AP VIEW)    Result Date: 12/22/2022  EXAMINATION: ONE SUPINE XRAY VIEW(S) OF THE ABDOMEN 12/22/2022 7:40 pm COMPARISON: None.  HISTORY: ORDERING SYSTEM PROVIDED HISTORY: OG placement TECHNOLOGIST PROVIDED HISTORY: Reason for exam:->OG placement Reason for Exam: OG placement FINDINGS: The enteric tube is in the stomach. Side hole is not seen. Mild gaseous distension of the stomach. Elevation of the right hemidiaphragm. Previous lumbar fixation     Enteric tube is in the stomach     XR CHEST PORTABLE    Result Date: 12/22/2022  EXAMINATION: ONE XRAY VIEW OF THE CHEST 12/22/2022 7:40 pm COMPARISON: Earlier today HISTORY: ORDERING SYSTEM PROVIDED HISTORY: ETT placement TECHNOLOGIST PROVIDED HISTORY: Reason for exam:->ETT placement Reason for Exam: ETT placement FINDINGS: Enteric tube has been placed. Tip is in the stomach. Elevation of the right hemidiaphragm which was not noted on the previous study. Consolidation in the right lower lobe and a small right pleural effusion. Endotracheal tube is at the susanne. This should be retracted approximately 1 cm     Endotracheal tube is at the susanne     XR CHEST PORTABLE    Result Date: 12/22/2022  EXAMINATION: ONE XRAY VIEW OF THE CHEST 12/22/2022 2:52 pm COMPARISON: Chest x-ray dated 02/13/2016. HISTORY: ORDERING SYSTEM PROVIDED HISTORY: #SOB TECHNOLOGIST PROVIDED HISTORY: Reason for exam:->#SOB Reason for Exam: sob FINDINGS: HEART/MEDIASTINUM: The cardiomediastinal silhouette is stable. PLEURA/LUNGS: There are no focal consolidations or pleural effusions. There is no appreciable pneumothorax. BONES/SOFT TISSUE: No acute abnormality. Unchanged thoracolumbar scoliosis with extensive postoperative changes. No radiographic evidence of acute pulmonary disease.         Latest Reference Range & Units 12/23/22 06:27   ALT 10 - 40 U/L 962 (H)   AST 15 - 37 U/L 3,162 (H)      Latest Reference Range & Units 12/23/22 09:10 12/23/22 09:11 12/23/22 09:38 12/23/22 13:26   POC Glucose 70 - 99 mg/dl 15 (LL) 15 (LL) 163 (H) 91      Latest Reference Range & Units 12/22/22 14:45 12/22/22 20:40 12/23/22 06:11   Lactic Acid, Sepsis 0.4 - 1.9 mmol/L 10.2 (HH) 5.4 (HH) 10.8 (HH)      Latest Reference Range & Units 2/7/16 21:30 2/9/16 20:33 12/22/22 23:07 12/23/22 08:28   Hemoglobin, Art, Extended 12.0 - 16.0 g/dL 12.8 11.9 (L) 9.0 (L) 9.8 (L)   pH, Arterial 7.350 - 7.450  7. 456 (H) 7.449 7.417 7.388   pCO2, Arterial 35.0 - 45.0 mmHg 44.7 48.7 (H) 21.4 (L) 22.9 (L)   pO2, Arterial 75.0 - 108.0 mmHg 72.0 (L) 67.6 (L) 161.7 (H) 144.7 (H)   HCO3, Arterial 21.0 - 29.0 mmol/L 30.8 (H) 33.0 (H) 13.5 (L) 13.5 (L)   TCO2 (calc), Art Not Established mmol/L 32.2 34.5 14.1 14.2   Base Excess, Arterial -3.0 - 3.0 mmol/L 6.1 (H) 7.9 (H) -9.6 (L) -10.0 (L)   O2 Sat, Arterial >92 % 94.8 93.8 98.3 98.5   O2 Content, Arterial Not Established mL/dL 17 16     Methemoglobin, Arterial <1.5 % 0.3 0.5 0.5 0.7   Carboxyhgb, Arterial 0.0 - 1.5 % 0.4 0.3 0.2 0.3     Blood Culture, Routine  Abnormal   Gram stain Aerobic bottle:   Gram negative rods   Information to follow   Gram stain Anaerobic bottle:   Gram negative rods   Information to follow   P      Organism Escherichia coli DNA Detected Abnormal  P    Blood Culture, Routine See additional report for complete BCID panel. Assessment:  Principal Problem:    Severe sepsis (HCC)  Active Problems:    Other secondary scoliosis, thoracolumbar region    Lactic acidosis    Pyuria    Elevated alkaline phosphatase level    Metabolic acidemia    Elevated procalcitonin    Bandemia    SVT (supraventricular tachycardia) (HCC)    Hyperthyroidism    Bacteremia due to Escherichia coli    Hypoglycemia    Shock liver    Pulmonary infiltrates    Spina bifida ([address]  Resolved Problems:    * No resolved hospital problems.  *          Plan:   Ventilator  support to keep saturation between 90 and 94% only  Ventilator settings and waveforms reviewed  Pulmonary toilet  IV sedation to maintain patient ventilator synchrony  Titration of sedation as per RASS scores  Patient has been started on cefepime and vancomycin which was changed to IV meropenem on the basis of patient's blood cultures which were positive for E. coli to cover any ESBL infections  Patient has been given fluid boluses  Patient continues to be hypotensive and patient was started on IV pressors and titration of pressors as per mean arterial pressure more than 65 mmHg  Patient had hypoglycemic episode this morning and patient was given D50 W and also D10 infusion has been started  Patient's bicarb drip was changed to dextrose base infusion  Trend the lactic acid levels which had improved transiently but has worsened again  Monitor for any worsening renal insufficiency  Patient was started on bicarb drip and saline was discontinued  Patient does have history of hypothyroidism and patient also had intermittent SVT on this admission  Patient is getting Tapazole at this time-is to be continued-patient's TSH/T3 and T4 were within normal range  Patient has profound abdominal distention and will try to do a CT of the abdomen pelvis today  Strict input output charting  Patient's renal function is to monitor closely as patient has potential for further decompensation and MAYTE secondary to ATN  Patient's WBC count and lactic acid to be trended  Patient has mottling of the legs secondary to sepsis on pressors-vascular consult was requested by IM and recommendation reviewed  Patient has a shock liver and patient's LFTs to be trended  Wound care as per wound care team  PUD and DVT prophylaxis    Patient's clinical status is precarious and critical and has poor potential for further decompensation with potential for non survival    Case was discussed with patient's mother and ICU team    Critical care time spent with patient was 35 minutes exclusive of any procedures    Patient's clinical status deteriorates further - consider palliative care consult to discuss the goals of care and CODE STATUS        Electronically signed by:  BRETT WEBER MD    12/23/2022    7:23 PM.

## 2022-12-23 NOTE — PLAN OF CARE
Problem: Nutrition Deficit:  Goal: Optimize nutritional status  Outcome: Not Progressing     Problem: Discharge Planning  Goal: Discharge to home or other facility with appropriate resources  Outcome: Progressing     Problem: Safety - Medical Restraint  Goal: Remains free of injury from restraints (Restraint for Interference with Medical Device)  Description: INTERVENTIONS:  1. Determine that other, less restrictive measures have been tried or would not be effective before applying the restraint  2. Evaluate the patient's condition at the time of restraint application  3. Inform patient/family regarding the reason for restraint  4.  Q2H: Monitor safety, psychosocial status, comfort, nutrition and hydration  Outcome: Progressing  Flowsheets (Taken 12/23/2022 1400)  Remains free of injury from restraints (restraint for interference with medical device):   Determine that other, less restrictive measures have been tried or would not be effective before applying the restraint   Evaluate the patient's condition at the time of restraint application   Inform patient/family regarding the reason for restraint   Every 2 hours: Monitor safety, psychosocial status, comfort, nutrition and hydration     Problem: Pain  Goal: Verbalizes/displays adequate comfort level or baseline comfort level  Outcome: Progressing

## 2022-12-23 NOTE — PROGRESS NOTES
1855: Patient intubated with a #7.5 ETT. Etomidate 20 mL given IV and Versed 2 mg IV given for intubation per Dr. Denzil Hashimoto. 1909: ETT exchanged due to blown cuff. # 7.5 ETT placed @ the 24 cm at the lip.

## 2022-12-23 NOTE — PROGRESS NOTES
12/23/22 1559   Patient Observation   Heart Rate 92   Resp 16   SpO2 96 %   Vent Information   Vent Mode AC/VC   Ventilator Settings   Vt (Set, mL) 400 mL   Resp Rate (Set) 16 bmp   PEEP/CPAP (cmH2O) 5   FiO2  40 %   Pressure Support (cm H2O) 0 cm H2O   Vent Patient Data (Readings)   Vt (Measured) 413 mL   Peak Inspiratory Pressure (cmH2O) 27 cmH2O   Rate Measured 16 br/min   Minute Volume (L/min) 6.61 Liters   Mean Airway Pressure (cmH2O) 11 cmH20   I:E Ratio 1:2.40   Flow Sensitivity 3 L/min   Vent Alarm Settings   High Pressure (cmH2O) 40 cmH2O   Low Minute Volume (lpm) 2 L/min   RR High (bpm) 40 br/min   Additional Respiratoray Assessments   Humidification Source HME   Ambu Bag With Mask At Bedside Yes   ETT    Placement Date: 12/22/22   Present on Admission/Arrival: No  Placed By: Licensed provider  Placement Verified By: Auscultation;Colorimetric ETCO2 device; Chest X-ray  Preoxygenation: Yes  Mask Ventilation: Ventilated by mask (1)  Airway Type: Cuffed  A...    Secured At 22 cm   Measured From Lips   ETT Placement Right   Secured By Commercial tube wayne   Site Assessment Dry   Cuff Pressure 32 cm H2O

## 2022-12-23 NOTE — PROCEDURES
ENDOTRACHEAL INTUBATION    INDICATION: Life threatening respiratory failure    TIME OUT: taken    SEDATION: etomidate and versed    PROCEDURE: Using video laryngoscopy, the vocal cords were well visualized and a 7.5 mm endotracheal tube was place directly through the cords. Good breath sounds auscultated bilaterally without sounds over abdomen. Good return of CO2 on the monitor. CXR is pending.    Apparently patient's ET tube cuff blew and patient had to have a new ET tube placed without any apparent complications  Estimated blood loss was 0    Hernan Hamlin MD

## 2022-12-23 NOTE — CONSULTS
Vascular Surgery Consultation    Date of Admission:  12/22/2022  2:41 PM  Date of Consultation:  12/23/2022    PCP:  Daniel Beaver DO       Chief Complaint: Altered mental status    History of Present Illness: We are asked to see this patient in consultation by Dr. Marylene Sage regarding lack of pulses and mottling of feet. Imelda Davila is a 49 y.o. female who has a history of mental retardation and spina bifida. She is non ambulatory. She has been admitted with severe sepsis and altered mental status. She was unresponsive, hypotensive and tachycardic and has required intubation with mechanical ventilation, fluid boluses and IV pressors. Nursing has noted cool bilateral feet with cyanosis and lack of doppler signals. Past Medical History:  Past Medical History:   Diagnosis Date    Hypertension     Mental retardation     functions ~ age 14    Paraplegia ([address]     S/P  shunt     behind right ear    Scoliosis     Hard time laying flat    Spina bifida ([address]     Wheelchair bound    Strabismus     Thyroid disease     hyperthyroidism    Toxic multinodular goiter     Urinary incontinence     self caths QID    UTI (urinary tract infection)     self caths every 4 hours       Past Surgical History:  Past Surgical History:   Procedure Laterality Date    BACK SURGERY      Rods for scoliosis    DENTAL SURGERY N/A 2/5/2020    DENTAL EXTRACTION # 19 performed by Allen Heredia DMD at [address] [place] [address] 9/12/2022    SURGICAL EXTRACTION OF TOOTH #8 performed by Allen Heredia DMD at [facility]      Multiple    VENTRICULOPERITONEAL SHUNT      WISDOM TOOTH EXTRACTION         Home Medications:   Prior to Admission medications    Medication Sig Start Date End Date Taking?  Authorizing Provider   ferrous sulfate (IRON 325) 325 (65 Fe) MG tablet Take 325 mg by mouth daily (with breakfast)    Historical Provider, MD   hydrocortisone 0.5 % cream Apply topically as needed Apply topically 2 times daily. Historical Provider, MD   trimethoprim (TRIMPEX) 100 MG tablet Take 100 mg by mouth daily    Historical Provider, MD   methimazole (TAPAZOLE) 5 MG tablet Take 5 mg by mouth daily    Historical Provider, MD   acetaminophen (TYLENOL) 325 MG tablet Take 650 mg by mouth every 6 hours as needed for Pain    Historical Provider, MD   Cholecalciferol (VITAMIN D3) 2000 UNITS CAPS Take 1,000 Units by mouth daily    Historical Provider, MD   mineral oil-hydrophilic petrolatum (AQUAPHOR) ointment Apply topically daily     Historical Provider, MD   metoprolol (TOPROL-XL) 25 MG XL tablet Take 25 mg by mouth daily. Historical Provider, MD   oxybutynin (DITROPAN XL) 10 MG CR tablet Take 10 mg by mouth 2 times daily     Historical Provider, MD        Facility Administered Medications:    mupirocin   Nasal BID    heparin (porcine)  5,000 Units SubCUTAneous BID    chlorhexidine  15 mL Mouth/Throat BID    carboxymethylcellulose PF  1 drop Both Eyes 6 times per day    methIMAzole  5 mg Oral Daily    sodium chloride flush  5-40 mL IntraVENous 2 times per day    cefTRIAXone (ROCEPHIN) IV  1,000 mg IntraVENous Q24H    lidocaine 1 % injection  5 mL IntraDERmal Once    pantoprazole  40 mg IntraVENous Daily       Allergies:  Patient has no known allergies.      Social History:      Social History     Socioeconomic History    Marital status: Single     Spouse name: Not on file    Number of children: Not on file    Years of education: Not on file    Highest education level: Not on file   Occupational History    Not on file   Tobacco Use    Smoking status: Never    Smokeless tobacco: Never   Substance and Sexual Activity    Alcohol use: No    Drug use: No    Sexual activity: Never   Other Topics Concern    Not on file   Social History Narrative    Not on file     Social Determinants of Health     Financial Resource Strain: Not on file   Food Insecurity: Not on file   Transportation Needs: Not on file Physical Activity: Not on file   Stress: Not on file   Social Connections: Not on file   Intimate Partner Violence: Not on file   Housing Stability: Not on file       Family History:        Problem Relation Age of Onset    High Blood Pressure Mother     High Blood Pressure Father     Diabetes Father        Review of Systems:  A 14 point review of systems was completed. Pertinent positives identified in the HPI, all other review of systems negative. Physical Examination:    /85   Pulse 85   Temp 97.3 °F (36.3 °C) (Bladder)   Resp 16   Wt 59 lb (26.8 kg)   SpO2 97%   BMI 18.00 kg/m²        Admission Weight: 59 lb (26.8 kg)       General appearance: NAD, intubated and sedated. Eyes: PERRLA  Neck: no JVD, no lymphadenopathy. Respiratory: effort is unlabored, no crackles, wheezes or rubs. Cardiovascular: regular, no murmur. No carotid bruits. No edema or varicosities. Pulses:    femoral   RIGHT 1   LEFT 1   Popliteal doppler signals- monophasic  GI: Distended. Musculoskeletal: lower limb abnormalities consistent with diagnosis of spina bifida. Skin: no dermatitis or ulceration. Vascular congestion bilateral feet. Neuro/psychiatric: intubated and sedated. Labs:   CBC:   Recent Labs     12/22/22  1445 12/23/22  0627   WBC 19.9* 32.4*   HGB 9.3* 8.5*   HCT 31.6* 28.3*   MCV 82.4 81.1    124*     BMP:   Recent Labs     12/22/22  1445      K 3.6   CL 99   CO2 17*   BUN 14   CREATININE 0.8   CALCIUM 8.7     Cardiac Enzymes: No results for input(s): CKTOTAL, CKMB, CKMBINDEX, TROPONINI in the last 72 hours. PT/INR: No results for input(s): PROTIME, INR in the last 72 hours. APTT: No results for input(s): APTT in the last 72 hours.   Liver Profile:  Lab Results   Component Value Date/Time    AST 37 12/22/2022 02:45 PM    ALT 16 12/22/2022 02:45 PM    BILITOT 0.4 12/22/2022 02:45 PM    ALKPHOS 248 12/22/2022 02:45 PM   No results found for: CHOL, HDL, TRIG  TSH:  No results found for: TSH  UA:   Lab Results   Component Value Date/Time    COLORU Yellow 12/22/2022 03:05 PM    PHUR 6.0 12/22/2022 03:05 PM    WBCUA >100 12/22/2022 03:05 PM    RBCUA None seen 12/22/2022 03:05 PM    BACTERIA 4+ 12/22/2022 03:05 PM    CLARITYU CLOUDY 12/22/2022 03:05 PM    SPECGRAV >=1.030 12/22/2022 03:05 PM    LEUKOCYTESUR MODERATE 12/22/2022 03:05 PM    UROBILINOGEN 1.0 12/22/2022 03:05 PM    BILIRUBINUR Negative 12/22/2022 03:05 PM    BLOODU LARGE 12/22/2022 03:05 PM    GLUCOSEU Negative 12/22/2022 03:05 PM           Assessment:  Severe Vasoconstriction due to sepsis and high does pressors. Likely has some underlying abnormal lower extremity flow abnormalities due to abnormal lower limb anatomy. Recommendations:  Treatment of sepsis and weaning of pressors should improve blood flow to feet. Non invasive testing at this point will be abnormal and would not recommend aggressive revascularization in any case given completely non ambulatory status. Will follow.

## 2022-12-23 NOTE — PROGRESS NOTES
12/22/22 2051   Patient Observation   Heart Rate (!) 110   Resp 18   Breath Sounds   Right Upper Lobe Clear   Right Middle Lobe Clear   Right Lower Lobe Diminished   Left Upper Lobe Clear   Left Lower Lobe Diminished   Vent Information   Ventilator Initiate Yes   Vent Mode AC/VC   Ventilator Settings   Vt (Set, mL) 400 mL   Resp Rate (Set) 16 bmp   PEEP/CPAP (cmH2O) 5   FiO2  40 %   Pressure Support (cm H2O) 0 cm H2O   Vent Patient Data (Readings)   Vt (Measured) 410 mL   Peak Inspiratory Pressure (cmH2O) 28 cmH2O   Rate Measured 17 br/min   Minute Volume (L/min) 7.06 Liters   Mean Airway Pressure (cmH2O) 11 cmH20   I:E Ratio 1:2.40   Flow Sensitivity 3 L/min   Backup Apnea On   Vent Alarm Settings   High Pressure (cmH2O) 40 cmH2O   Low Minute Volume (lpm) 3 L/min   Low Exhaled Vt (ml) 200 mL   RR High (bpm) 40 br/min   Apnea (secs) 20 secs   ETT    Placement Date: 12/22/22   Present on Admission/Arrival: No  Placed By: Licensed provider  Placement Verified By: Auscultation;Colorimetric ETCO2 device; Chest X-ray  Preoxygenation: Yes  Mask Ventilation: Ventilated by mask (1)  Airway Type: Cuffed  A...    Secured At 23 cm   Measured From Lips   ETT Placement Right   Secured By Commercial tube wayne   Site Assessment Dry   Cuff Pressure 32 cm H2O

## 2022-12-23 NOTE — PROGRESS NOTES
18- Spoke with MD about patients cold, purple feet with lose of pulses. At this time only right femoral pulse can be found. Daljit- MD stated it is likely due to peripheral vasoconstriction. Placed Vascular surgery consult. Advised to pas on to daytime RN to touch base with rounding attending.

## 2022-12-23 NOTE — PROGRESS NOTES
Hospitalist Progress Note      PCP: Narciso Tarango DO    Date of Admission: 12/22/2022    Chief Complaint: [address] Course:   49 y.o. female who presented to [facility] with AMS. Patient has a history of spinal bifida and mental retardation. She is wheel chair bound and her mother is her caregiver and decision maker. Per patient's mother, patient started seeming fatigued a few hours ago. This quickly progressed and patient started to seem somnolent. By the time she presented to the ED, patient was unarousable. She had a high fever and severe tachycardia. She regained consciousness once IVF were started. Patient has a neurogenic bladder and straight caths multiple times per day. She had evidence of UTI on a UA. Patient had good response to IVF but remains critically ill in ED.     Subjective: intubated, sedated       Medications:  Reviewed    Infusion Medications    fentaNYL      sodium chloride      dexmedetomidine 0.4 mcg/kg/hr (12/23/22 0631)    sodium chloride      norepinephrine 25 mcg/min (12/23/22 0655)    sodium bicarbonate infusion 150 mL/hr at 12/23/22 0619     Scheduled Medications    mupirocin   Nasal BID    heparin (porcine)  5,000 Units SubCUTAneous BID    chlorhexidine  15 mL Mouth/Throat BID    carboxymethylcellulose PF  1 drop Both Eyes 6 times per day    cefepime  2,000 mg IntraVENous Q12H    metroNIDAZOLE  500 mg IntraVENous Q8H    methIMAzole  5 mg Oral Daily    sodium chloride flush  5-40 mL IntraVENous 2 times per day    lidocaine 1 % injection  5 mL IntraDERmal Once    pantoprazole  40 mg IntraVENous Daily     PRN Meds: sodium chloride flush, sodium chloride, acetaminophen **OR** acetaminophen, sodium chloride      Intake/Output Summary (Last 24 hours) at 12/23/2022 0835  Last data filed at 12/23/2022 0600  Gross per 24 hour   Intake 3473.82 ml   Output 35 ml   Net 3438.82 ml       Physical Exam Performed:    /85   Pulse 93   Temp 97.3 °F (36.3 °C) (Bladder)   Resp 18   Wt 59 lb (26.8 kg)   SpO2 96%   BMI 18.00 kg/m²     General appearance:  intubated, sedated  HEENT:  Normal cephalic, atraumatic without obvious deformity. Pupils equal, round, and reactive to light. Extra ocular muscles intact. Conjunctivae/corneas clear. Neck: Supple, with full range of motion. No jugular venous distention. Trachea midline. Respiratory:  Normal respiratory effort. Clear to auscultation, bilaterally without Rales/Wheezes/Rhonchi. Cardiovascular:  tachycardia, regular rhythm with normal S1/S2 without murmurs, rubs or gallops. Abdomen: Soft, non-tender, non-distended with normal bowel sounds. Musculoskeletal:  No clubbing, cyanosis or edema bilaterally. Full range of motion without deformity. Chronic LE contractures, poor tone  Skin: Skin color, texture, turgor normal.  No rashes or lesions. Neurologic:  Neurovascularly intact without any focal sensory/motor deficits. Cranial nerves: II-XII intact, grossly non-focal.  Psychiatric:  sedated  Capillary Refill: Brisk,3 seconds, normal  Peripheral Pulses: +2 palpable, equal bilaterally        Labs:   Recent Labs     12/22/22  1445 12/23/22  0627   WBC 19.9* 32.4*   HGB 9.3* 8.5*   HCT 31.6* 28.3*    124*     Recent Labs     12/22/22  1445      K 3.6   CL 99   CO2 17*   BUN 14   CREATININE 0.8   CALCIUM 8.7     Recent Labs     12/22/22  1445   AST 37   ALT 16   BILITOT 0.4   ALKPHOS 248*     No results for input(s): INR in the last 72 hours. No results for input(s): Cheryl Horn in the last 72 hours.     Urinalysis:      Lab Results   Component Value Date/Time    NITRU Negative 12/22/2022 03:05 PM    WBCUA >100 12/22/2022 03:05 PM    BACTERIA 4+ 12/22/2022 03:05 PM    RBCUA None seen 12/22/2022 03:05 PM    BLOODU LARGE 12/22/2022 03:05 PM    SPECGRAV >=1.030 12/22/2022 03:05 PM    GLUCOSEU Negative 12/22/2022 03:05 PM       Radiology:  XR ABDOMEN (KUB) (SINGLE AP VIEW)   Final Result   Enteric tube is in the stomach XR CHEST PORTABLE   Final Result   Endotracheal tube is at the susanne         XR CHEST PORTABLE   Final Result   No radiographic evidence of acute pulmonary disease. CT ABDOMEN PELVIS WO CONTRAST Additional Contrast? 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]      Priority: Medium    Lactic acidosis [E87.20]      Priority: Medium    Pyuria [R82.81]      Priority: Medium    Elevated alkaline phosphatase level [R74.8]      Priority: Medium    Metabolic acidemia [X27.16]      Priority: Medium    Elevated procalcitonin [R79.89]      Priority: Medium    Bandemia [D72.825]      Priority: Medium    SVT (supraventricular tachycardia) ([facility]) [I47.1]      Priority: Medium    Hyperthyroidism [E05.90]      Priority: Medium    Spina bifida ([facility] [address] [Q05.9]      Septic shock  - presented with tachycardia, fever, leukocytosis. Lactate 10.2 on admission  - continue IVF  - suspect UTI based on UA.  Other sources of infection include pneumonia and intra-abdominal infection  - wean pressors as able  - CT abd/pel ordered  - blood, urine cultures ordered  - started on vanc, cefepime, flagyl    Acute hypoxic respiratory failure  - 2/2 above  - pulmonology consulted  - intubated 12/22  - vent management per pulm     Acute metabolic encephalopathy  - 2/2 above  - supportive care     Lactic acidosis  - severe, 2/2 sepsis  - continue bicarb gtt  - vascular surgery consulted for severe mottling  - no surgical intervention warranted  - continue to monitor     Hyperthyroidism  - TSH ordered  - continue home methimazole     Anemia  - chronic, stable  - holding home Fe supplement  - continue to monitor     HTN  - hypotension on admission  - holding metoprolol     Spina Bifida  - paraplegic at baseline  - supportive care    DVT Prophylaxis: lovenox  Diet: ADULT DIET;  Regular  Code Status: Full Code  PT/OT Eval Status: ordered    Dispo - ICU    Appropriate for A1 Discharge Unit: No      Argenis Katz MD

## 2022-12-23 NOTE — PROGRESS NOTES
Comprehensive Nutrition Assessment    Type and Reason for Visit:  Initial (New vent)    Nutrition Recommendations/Plan:   Continue Nepro formula at 20 mL/hr per MD  When approved by MD, as tolerated, increase rate by 10 mL/hr q 4 hours until goal of 30 mL/hr is met via O/G   Recommend 30 mL H20 flush q 4 hours. Monitor IVF infusion, Na labs and need for adjustments in water flush  Monitor TF tolerance (abd distention, bowel habits, N/V, cramping)  Monitor nutrition adequacy, pertinent labs, bowel habits, wt changes, and clinical progress     Malnutrition Assessment:  Malnutrition Status:  Insufficient data (12/23/22 1446)    Context:  Acute Illness       Nutrition Assessment:    New vent: 49 y.o. female admitted w/ AMS. PMH spinal bifida and mental retardation. Pt was unresponsive, hypotensive and tachycardic, required intubation yesterday. Pt w/ MAYTE and UTI. Intubated and sedated on precedex and fentanyl in the ICU. Hypotensive on levo. Weight recorded was stated, RD to order new updated weight. MD started Nepro formula at trophic rate, will continue. Goal rate recommendations included, will continue to monitor. Nutrition Related Findings:    IVF at 150 ml/hr, D5 at 100 ml/hr. BG dropped to 49. Labs reviewed. Wound Type: Stage III, Pressure Injury       Current Nutrition Intake & Therapies:    Average Meal Intake: NPO  Average Supplements Intake: NPO  ADULT TUBE FEEDING; Orogastric; Renal Formula; Continuous; 20; Yes; 0; Other (specify); trophic; 20; 30; Q 4 hours  Current Tube Feeding (TF) Orders:  Feeding Route: Orogastric  Formula: Renal Formula  Schedule: Continuous  Goal TF & Flush Orders Provides: Nepro (renal formula) x 20 hours at goal rate of 30 ml/hr to provide 600 ml TV, 1080 kcals, 49 g protein and 436 ml free water. FRee water flush 30 ml q 4 hours. Anthropometric Measures:  Height: 4' (121.9 cm)  Ideal Body Weight (IBW): 40 lbs (18 kg)       Current Body Weight: 59 lb (26.8 kg), 147.5 % IBW. Weight Source: Stated  Current BMI (kg/m2): 18                       BMI Categories: Normal Weight (BMI 18.5-24. 9)    Estimated Daily Nutrient Needs:  Energy Requirements Based On: Kcal/kg (35-40 kcals/kg)  Weight Used for Energy Requirements: Current (27 kg)  Energy (kcal/day): 945-1080  Weight Used for Protein Requirements: Current (2-2.2 g/kg)  Protein (g/day): 54-59 g  Method Used for Fluid Requirements: 1 ml/kcal    Nutrition Diagnosis:   Inadequate energy intake related to impaired respiratory function as evidenced by NPO or clear liquid status due to medical condition, intubation, nutrition support - enteral nutrition    Nutrition Interventions:   Food and/or Nutrient Delivery: Start Tube Feeding  Nutrition Education/Counseling: No recommendation at this time  Coordination of Nutrition Care: Continue to monitor while inpatient       Goals:     Goals: Initiate nutrition support, within 2 days       Nutrition Monitoring and Evaluation:   Behavioral-Environmental Outcomes: None Identified  Food/Nutrient Intake Outcomes: Enteral Nutrition Intake/Tolerance  Physical Signs/Symptoms Outcomes: Biochemical Data, Nutrition Focused Physical Findings, Weight    Discharge Planning:     Too soon to determine     Natasha Bass [address], [address] [place] [address],   Contact: Office: [phone]; [address]: [phone]

## 2022-12-23 NOTE — PROGRESS NOTES
12/23/22 1107   Patient Observation   Heart Rate 88   Resp 15   SpO2 97 %   Vent Information   Vent Mode AC/VC   Ventilator Settings   Vt (Set, mL) 400 mL   Resp Rate (Set) 16 bmp   PEEP/CPAP (cmH2O) 5   FiO2  40 %   Pressure Support (cm H2O) 0 cm H2O   Vent Patient Data (Readings)   Vt (Measured) 414 mL   Peak Inspiratory Pressure (cmH2O) 28 cmH2O   Rate Measured 16 br/min   Minute Volume (L/min) 6.6 Liters   Mean Airway Pressure (cmH2O) 11 cmH20   I:E Ratio 1:2.40   Flow Sensitivity 3 L/min   Vent Alarm Settings   High Pressure (cmH2O) 40 cmH2O   Low Minute Volume (lpm) 2 L/min   RR High (bpm) 40 br/min   Additional Respiratoray Assessments   Humidification Source HME   Ambu Bag With Mask At Bedside Yes   ETT    Placement Date: 12/22/22   Present on Admission/Arrival: No  Placed By: Licensed provider  Placement Verified By: Auscultation;Colorimetric ETCO2 device; Chest X-ray  Preoxygenation: Yes  Mask Ventilation: Ventilated by mask (1)  Airway Type: Cuffed  A...    Secured At 22 cm   Measured From Teeth   ETT Placement Center   Secured By Commercial tube wayne   Site Assessment Dry   Patient Transport   Time Spent Transporting 31-45   Transport Ventillation Type Transport vent   Transport From ICU   Transport Destination CT scan   Transport Destination ICU   Emergency Equipment Included Yes

## 2022-12-23 NOTE — PROGRESS NOTES
12/23/22 0808   Patient Observation   Heart Rate 86   Resp 16   SpO2 97 %   Vent Information   Vent Mode AC/VC   Ventilator Settings   Vt (Set, mL) 400 mL   Resp Rate (Set) 16 bmp   PEEP/CPAP (cmH2O) 5   FiO2  40 %   Pressure Support (cm H2O) 0 cm H2O   Vent Patient Data (Readings)   Vt (Measured) 408 mL   Peak Inspiratory Pressure (cmH2O) 30 cmH2O   Rate Measured 16 br/min   Minute Volume (L/min) 6.5 Liters   Mean Airway Pressure (cmH2O) 11 cmH20   I:E Ratio 1:2.40   Flow Sensitivity 3 L/min   Vent Alarm Settings   High Pressure (cmH2O) 40 cmH2O   Low Minute Volume (lpm) 3 L/min   High Minute Volume (lpm) 20 L/min   Low Exhaled Vt (ml) 200 mL   High Exhaled Vt (ml) 1000 mL   RR Low (bpm) 1000   RR High (bpm) 40 br/min   Apnea (secs) 20 secs   ETT    Placement Date: 12/22/22   Present on Admission/Arrival: No  Placed By: Licensed provider  Placement Verified By: Auscultation;Colorimetric ETCO2 device; Chest X-ray  Preoxygenation: Yes  Mask Ventilation: Ventilated by mask (1)  Airway Type: Cuffed  A...    Secured At 22 cm   Measured From Lips   ETT Placement Right   Secured By Commercial tube wayne   Site Assessment Dry   Cuff Pressure 32 cm H2O

## 2022-12-24 PROBLEM — D69.6 THROMBOCYTOPENIA (HCC): Status: ACTIVE | Noted: 2022-01-01

## 2022-12-24 NOTE — PROGRESS NOTES
Hospitalist Progress Note      PCP: Yaa Medina DO    Date of Admission: 12/22/2022    Chief Complaint: [address] Course:   49 y.o. female who presented to [facility] with AMS. Patient has a history of spinal bifida and mental retardation. She is wheel chair bound and her mother is her caregiver and decision maker. Per patient's mother, patient started seeming fatigued a few hours ago. This quickly progressed and patient started to seem somnolent. By the time she presented to the ED, patient was unarousable. She had a high fever and severe tachycardia. She regained consciousness once IVF were started. Patient has a neurogenic bladder and straight caths multiple times per day. She had evidence of UTI on a UA. Patient had good response to IVF but remains critically ill in ED.     Subjective: intubated, sedated       Medications:  Reviewed    Infusion Medications    vasopressin (Septic Shock) infusion      fentaNYL 50 mcg/hr (12/24/22 0542)    dextrose      sodium bicarbonate infusion 100 mL/hr at 12/24/22 0855    sodium chloride      dexmedetomidine 0.7 mcg/kg/hr (12/24/22 0542)    sodium chloride      norepinephrine 6 mcg/min (12/24/22 0542)     Scheduled Medications    calcium gluconate IVPB  1,000 mg IntraVENous Once    mupirocin   Nasal BID    heparin (porcine)  5,000 Units SubCUTAneous BID    chlorhexidine  15 mL Mouth/Throat BID    carboxymethylcellulose PF  1 drop Both Eyes 6 times per day    meropenem  1,000 mg IntraVENous Q12H    collagenase   Topical Daily    methIMAzole  5 mg Oral Daily    sodium chloride flush  5-40 mL IntraVENous 2 times per day    lidocaine 1 % injection  5 mL IntraDERmal Once    pantoprazole  40 mg IntraVENous Daily     PRN Meds: magnesium sulfate, potassium chloride, glucose, dextrose bolus **OR** dextrose bolus, glucagon (rDNA), dextrose, sodium chloride flush, sodium chloride, acetaminophen **OR** acetaminophen, sodium chloride      Intake/Output Summary (Last 24 hours) at 12/24/2022 0941  Last data filed at 12/24/2022 0542  Gross per 24 hour   Intake 5091.7 ml   Output 541 ml   Net 4550.7 ml       Physical Exam Performed:    BP 87/63   Pulse 92   Temp 97.9 °F (36.6 °C)   Resp 16   Ht 4' (1.219 m)   Wt 77 lb 6.1 oz (35.1 kg)   SpO2 97%   BMI 23.61 kg/m²     General appearance:  intubated, sedated  HEENT:  Normal cephalic, atraumatic without obvious deformity. Pupils equal, round, and reactive to light. Extra ocular muscles intact. Conjunctivae/corneas clear. Neck: Supple, with full range of motion. No jugular venous distention. Trachea midline. Respiratory:  Normal respiratory effort. Clear to auscultation, bilaterally without Rales/Wheezes/Rhonchi. Cardiovascular:  tachycardia, regular rhythm with normal S1/S2 without murmurs, rubs or gallops. Abdomen: Soft, non-tender, non-distended with normal bowel sounds. Musculoskeletal:  No clubbing, cyanosis or edema bilaterally. Full range of motion without deformity. Chronic LE contractures, poor tone  Skin: Skin color, texture, turgor normal.  No rashes or lesions. Neurologic:  Neurovascularly intact without any focal sensory/motor deficits. Cranial nerves: II-XII intact, grossly non-focal.  Psychiatric:  sedated  Capillary Refill: Brisk,3 seconds, normal  Peripheral Pulses: +2 palpable, equal bilaterally        Labs:   Recent Labs     12/22/22  1445 12/23/22  0627 12/24/22  0530   WBC 19.9* 32.4* 31.3*   HGB 9.3* 8.5* 7.9*   HCT 31.6* 28.3* 25.5*    124* 119*     Recent Labs     12/22/22  1445 12/23/22  0627 12/24/22  0530    138 133*   K 3.6 4.2 3.2*   CL 99 100 94*   CO2 17* 13* 24   BUN 14 21* 27*   CREATININE 0.8 1.0 1.1   CALCIUM 8.7 6.2* 5.6*     Recent Labs     12/22/22  1445 12/23/22  0627 12/24/22  0530   AST 37 3,162* 4,285*   ALT 16 962* 1,817*   BILIDIR  --   --  <0.2   BILITOT 0.4 0.4 0.4   ALKPHOS 248* 90 105     No results for input(s): INR in the last 72 hours.   No results for input(s): Reji Sin in the last 72 hours. Urinalysis:      Lab Results   Component Value Date/Time    NITRU Negative 12/22/2022 03:05 PM    WBCUA >100 12/22/2022 03:05 PM    BACTERIA 4+ 12/22/2022 03:05 PM    RBCUA None seen 12/22/2022 03:05 PM    BLOODU LARGE 12/22/2022 03:05 PM    SPECGRAV >=1.030 12/22/2022 03:05 PM    GLUCOSEU Negative 12/22/2022 03:05 PM       Radiology:  CT ABDOMEN PELVIS WO CONTRAST Additional Contrast? None   Final Result   1. Technically limited CT due to the patient's underlying medical condition   and artifact from positioning, hardware and lack of IV contrast.   2. Probable sacral decubitus ulcer for which correlation with physical exam   is advised. 3. Questionable inflammation at the left hip joint which may simply be due to   the patient's underlying medical condition and stasis. Osteoarthritis or   septic pattern of arthritis cannot be definitively excluded. 4. Heterogeneity of the right kidney that is difficult to evaluate. An   underlying pattern of inflammation/pyelonephritis may be considered in the   appropriate clinical setting. No obvious sign of obstructive uropathy. 5. Bibasilar pleural fluid and airspace changes. Correlate with pulmonary   symptoms. XR ABDOMEN (KUB) (SINGLE AP VIEW)   Final Result   Enteric tube is in the stomach         XR CHEST PORTABLE   Final Result   Endotracheal tube is at the susanne         XR CHEST PORTABLE   Final Result   No radiographic evidence of acute pulmonary disease.              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  Lactate 10.2 on admission  - continue IVF  - blood, urine cultures positive of E coli  - suspect pyelonephritis based on CT abd/pel and culture results  - wean pressors as able  - continue merrem    Acute hypoxic respiratory failure  - 2/2 above  - pulmonology consulted  - intubated 12/22  - vent management per pulm     Acute metabolic encephalopathy  - 2/2 above  - supportive care     Lactic acidosis  - severe, 2/2 sepsis  - continue bicarb gtt  - vascular surgery consulted for severe mottling  - no surgical intervention warranted  - continue to monitor     Hyperthyroidism  - TSH WNL  - continue home methimazole     Anemia  - chronic, stable  - holding home Fe supplement  - continue to monitor     HTN  - hypotension on admission  - holding metoprolol     Spina Bifida  - paraplegic at baseline  - supportive care    DVT Prophylaxis: lovenox  Diet: ADULT TUBE FEEDING; Orogastric; Renal Formula; Continuous; 20; Yes; 0; Other (specify); trophic; 20; 30; Q 4 hours  Code Status: Full Code  PT/OT Eval Status: ordered    Dispo - ICU    Appropriate for A1 Discharge Unit: No      Seymour Hurley MD

## 2022-12-24 NOTE — PROGRESS NOTES
krystal Baird     responded to Pt's mother request for a visit. Pt lost her father a year ago, unexpectedly. Pt's mom is present at the bedside processing unresolved grief(re:) and anticipatory grief related to Pt/dtr. Mother stated \"I am not ready to let her go\" and reflected on Pt's life of pain and suffering. CH provided active and empathic listening, words of comfort and reassurance of God's presence, love and care. [address] nurtured mother's hope. Pt nodded her head in acceptance of CH's prayers and Psalm readings. Ch concluded the visit with a blessing.  will remain available to offer spiritual support. 12/24/22 1502   Encounter Summary   Encounter Overview/Reason  Initial Encounter   Service Provided For: Patient and family together   Referral/Consult From: Nurse   Support System Family members;Parent   Last Encounter  12/24/22  (AP: [address] offered emotional/spiritual support to Pt and her mom. Prayers and Psalm of comfort.)   Complexity of Encounter Moderate   Begin Time 1430   End Time  1500   Total Time Calculated 30 min   Encounter    Type Family Care   Spiritual/Emotional needs   Type Spiritual Support   Assessment/Intervention/Outcome   Assessment Complicated grieving;Concerns with suffering   Intervention Active listening;Explored/Affirmed feelings, thoughts, concerns;Nurtured Hope;Prayer (assurance of)/[place];Read/Provided Scripture   Outcome Comfort;Encouraged; Connection/Belonging   Plan and Referrals   Plan/Referrals Continue to visit, (comment)

## 2022-12-24 NOTE — PROGRESS NOTES
12/24/22 0926   Patient Observation   Heart Rate 92   Resp 16   SpO2 97 %   Breath Sounds   Right Upper Lobe Clear   Right Middle Lobe Clear   Right Lower Lobe Clear   Left Upper Lobe Clear   Left Lower Lobe Clear   Vent Information   Vent Mode AC/VC   Ventilator Settings   Vt (Set, mL) 400 mL   Resp Rate (Set) 16 bmp   PEEP/CPAP (cmH2O) 5   FiO2  40 %   Pressure Support (cm H2O) 0 cm H2O   Vent Patient Data (Readings)   Vt (Measured) 415 mL   Peak Inspiratory Pressure (cmH2O) 33 cmH2O   Rate Measured 16 br/min   Minute Volume (L/min) 6.62 Liters   Mean Airway Pressure (cmH2O) 12 cmH20   I:E Ratio 1:2.40   Vent Alarm Settings   High Pressure (cmH2O) 40 cmH2O   Low Minute Volume (lpm) 2 L/min   High Minute Volume (lpm) 20 L/min   Low Exhaled Vt (ml) 200 mL   RR High (bpm) 40 br/min   Apnea (secs) 20 secs   Additional Respiratoray Assessments   Humidification Source HME   Ambu Bag With Mask At Bedside Yes   Airway Clearance   Suction ET Tube   Subglottic Suction Done No   Suction Device Inline suction catheter   Sputum Method Obtained Endotracheal   Sputum Amount Scant   Sputum Color/Odor Tan   Sputum Consistency Thick; Thin   ETT    Placement Date: 12/22/22   Present on Admission/Arrival: No  Placed By: Licensed provider  Placement Verified By: Auscultation;Colorimetric ETCO2 device; Chest X-ray  Preoxygenation: Yes  Mask Ventilation: Ventilated by mask (1)  Airway Type: Cuffed  A...    Secured At 24 cm   Measured From Lips   ETT Placement Center   Secured By Commercial tube wayne   Site Assessment Dry   Cuff Pressure 30 cm H2O

## 2022-12-24 NOTE — PROGRESS NOTES
12/24/22 1609   Patient Observation   Heart Rate 89   Resp 16   SpO2 95 %   Vent Information   Vent Mode AC/VC   Ventilator Settings   Vt (Set, mL) 400 mL   Resp Rate (Set) 16 bmp   PEEP/CPAP (cmH2O) 5   FiO2  40 %   Pressure Support (cm H2O) 0 cm H2O   Vent Patient Data (Readings)   Vt (Measured) 418 mL   Peak Inspiratory Pressure (cmH2O) 44 cmH2O   Rate Measured 16 br/min   Minute Volume (L/min) 6.68 Liters   Mean Airway Pressure (cmH2O) 15 cmH20   I:E Ratio 1:2.40   Vent Alarm Settings   High Pressure (cmH2O) 55 cmH2O   Low Minute Volume (lpm) 2 L/min   RR High (bpm) 40 br/min   Additional Respiratoray Assessments   Humidification Source HME   ETT    Placement Date: 12/22/22   Present on Admission/Arrival: No  Placed By: Licensed provider  Placement Verified By: Auscultation;Colorimetric ETCO2 device; Chest X-ray  Preoxygenation: Yes  Mask Ventilation: Ventilated by mask (1)  Airway Type: Cuffed  A...    Secured At 22 cm   Measured From Lips   ETT Placement Right   Secured By Commercial tube paiz   Site Assessment Dry   Cuff Pressure 30 cm H2O   Tie/Paiz Changed No

## 2022-12-24 NOTE — PROGRESS NOTES
12/24/22 0025   Patient Observation   Heart Rate 91   Resp 18   SpO2 95 %   Breath Sounds   Right Upper Lobe Clear   Right Middle Lobe Clear   Right Lower Lobe Clear   Left Upper Lobe Clear   Left Lower Lobe Clear   Vent Information   Vent Mode AC/VC   Ventilator Settings   Vt (Set, mL) 400 mL   Resp Rate (Set) 16 bmp   PEEP/CPAP (cmH2O) 5   FiO2  40 %   Pressure Support (cm H2O) 0 cm H2O   Vent Patient Data (Readings)   Vt (Measured) 304 mL   Peak Inspiratory Pressure (cmH2O) 26 cmH2O   Rate Measured 16 br/min   Minute Volume (L/min) 4.88 Liters   Mean Airway Pressure (cmH2O) 11 cmH20   I:E Ratio 1:2.40   Flow Sensitivity 3 L/min   Vent Alarm Settings   High Pressure (cmH2O) 40 cmH2O   Low Minute Volume (lpm) 2 L/min   Low Exhaled Vt (ml) 200 mL   RR High (bpm) 40 br/min   Apnea (secs) 20 secs   Additional Respiratoray Assessments   Humidification Source HME   Ambu Bag With Mask At Bedside Yes   ETT    Placement Date: 12/22/22   Present on Admission/Arrival: No  Placed By: Licensed provider  Placement Verified By: Auscultation;Colorimetric ETCO2 device; Chest X-ray  Preoxygenation: Yes  Mask Ventilation: Ventilated by mask (1)  Airway Type: Cuffed  A...    Secured At 22 cm   Measured From Lips   ETT Placement Center   Secured By Commercial tube wayne   Site Assessment Dry   Cuff Pressure 30 cm H2O

## 2022-12-24 NOTE — PROGRESS NOTES
VASCULAR    Seen for BLE cyanosis and ischemia. Remains on pressor support without significant wean. Feet cyanotic with minimal blanching  Vasospasm with associated ischemia due to sepsis and pressor support. No plans for any form of vascular intervention at this time. Wean pressor agents as possible. High risk for some degree of tissue/limb loss if/when she recovers from sepsis.     Kami Nannette

## 2022-12-24 NOTE — PROGRESS NOTES
INPATIENT PULMONARY CRITICAL CARE PROGRESS NOTE      Reason for visit    severe sepsis    SUBJECTIVE: Patient when seen this morning continues to be critically ill on mechanical ventilatory support, patient has modest secretions endotracheal tube, patient was afebrile, patient continues to be hypotensive and continues to be on norepinephrine infusion but patient was having increased mottling of the lower extremities and transition from norepinephrine to vasopressin was tried but patient had worsening hemodynamics, patient also has had hypoglycemia in spite of being on enteral feeds and had to be given dextrose infusion, patient continues to be on IV sedation to maintain patient mental synchrony       Physical Exam:  Blood pressure 84/63, pulse 89, temperature 98.9 °F (37.2 °C), temperature source Bladder, resp. rate 16, height 4' (1.219 m), weight 77 lb 6.1 oz (35.1 kg), SpO2 95 %, not currently breastfeeding.'     Constitutional: No profound respiratory distress on mechanical vent to support  HENT: Endotracheal tube present. No thyromegaly. Eyes:  Conjunctivae are normal. Pupils equal, round, and reactive to light. No scleral icterus. Neck: . No tracheal deviation present. No obvious thyroid mass. Cardiovascular: Sinus rhythm. No lower extremity edema. Pulmonary/Chest: No wheezes. Scattered rales. Chest wall is not dull to percussion. No use of accessory muscle respiration  Abdominal: Profoundly distended a abdomen when seen  Musculoskeletal: No cyanosis. No clubbing. Spina bifida with scoliosis Patient has atrophic lower extremities  Lymphadenopathy: No cervical or supraclavicular adenopathy.    Skin: Mottling of the skin especially in the distal aspect of lower extremities which is worsening along with that patient also has slight duskiness of the left fingers, sacral decub  Neurologic intubated and sedated      Results:  CBC:   Recent Labs     12/22/22  1445 12/23/22  0627 12/24/22  0530   WBC 19.9* 32.4* 31.3*   HGB 9.3* 8.5* 7.9*   HCT 31.6* 28.3* 25.5*   MCV 82.4 81.1 77.0*    124* 119*     BMP:   Recent Labs     12/22/22  1445 12/23/22  0627 12/24/22  0530 12/24/22  1522    138 133*  --    K 3.6 4.2 3.2* 3.8   CL 99 100 94*  --    CO2 17* 13* 24  --    BUN 14 21* 27*  --    CREATININE 0.8 1.0 1.1  --      LIVER PROFILE:   Recent Labs     12/22/22  1445 12/23/22  0627 12/24/22  0530   AST 37 3,162* 4,285*   ALT 16 962* 1,817*   BILIDIR  --   --  <0.2   BILITOT 0.4 0.4 0.4   ALKPHOS 248* 90 105       UA:  Recent Labs     12/22/22  1505   COLORU Yellow   PHUR 6.0   WBCUA >100*   RBCUA None seen   BACTERIA 4+*   CLARITYU CLOUDY*   SPECGRAV >=1.030   LEUKOCYTESUR MODERATE*   UROBILINOGEN 1.0   BILIRUBINUR Negative   BLOODU LARGE*   GLUCOSEU Negative       Imaging:  I have reviewed radiology images personally. CT ABDOMEN PELVIS WO CONTRAST Additional Contrast? None   Final Result   1. Technically limited CT due to the patient's underlying medical condition   and artifact from positioning, hardware and lack of IV contrast.   2. Probable sacral decubitus ulcer for which correlation with physical exam   is advised. 3. Questionable inflammation at the left hip joint which may simply be due to   the patient's underlying medical condition and stasis. Osteoarthritis or   septic pattern of arthritis cannot be definitively excluded. 4. Heterogeneity of the right kidney that is difficult to evaluate. An   underlying pattern of inflammation/pyelonephritis may be considered in the   appropriate clinical setting. No obvious sign of obstructive uropathy. 5. Bibasilar pleural fluid and airspace changes. Correlate with pulmonary   symptoms. XR ABDOMEN (KUB) (SINGLE AP VIEW)   Final Result   Enteric tube is in the stomach         XR CHEST PORTABLE   Final Result   Endotracheal tube is at the susanne         XR CHEST PORTABLE   Final Result   No radiographic evidence of acute pulmonary disease. XR ABDOMEN (KUB) (SINGLE AP VIEW)    Result Date: 12/22/2022  EXAMINATION: ONE SUPINE XRAY VIEW(S) OF THE ABDOMEN 12/22/2022 7:40 pm COMPARISON: None. HISTORY: ORDERING SYSTEM PROVIDED HISTORY: OG placement TECHNOLOGIST PROVIDED HISTORY: Reason for exam:->OG placement Reason for Exam: OG placement FINDINGS: The enteric tube is in the stomach. Side hole is not seen. Mild gaseous distension of the stomach. Elevation of the right hemidiaphragm. Previous lumbar fixation     Enteric tube is in the stomach     XR CHEST PORTABLE    Result Date: 12/22/2022  EXAMINATION: ONE XRAY VIEW OF THE CHEST 12/22/2022 7:40 pm COMPARISON: Earlier today HISTORY: ORDERING SYSTEM PROVIDED HISTORY: ETT placement TECHNOLOGIST PROVIDED HISTORY: Reason for exam:->ETT placement Reason for Exam: ETT placement FINDINGS: Enteric tube has been placed. Tip is in the stomach. Elevation of the right hemidiaphragm which was not noted on the previous study. Consolidation in the right lower lobe and a small right pleural effusion. Endotracheal tube is at the susanne. This should be retracted approximately 1 cm     Endotracheal tube is at the susanne     XR CHEST PORTABLE    Result Date: 12/22/2022  EXAMINATION: ONE XRAY VIEW OF THE CHEST 12/22/2022 2:52 pm COMPARISON: Chest x-ray dated 02/13/2016. HISTORY: ORDERING SYSTEM PROVIDED HISTORY: #SOB TECHNOLOGIST PROVIDED HISTORY: Reason for exam:->#SOB Reason for Exam: sob FINDINGS: HEART/MEDIASTINUM: The cardiomediastinal silhouette is stable. PLEURA/LUNGS: There are no focal consolidations or pleural effusions. There is no appreciable pneumothorax. BONES/SOFT TISSUE: No acute abnormality. Unchanged thoracolumbar scoliosis with extensive postoperative changes. No radiographic evidence of acute pulmonary disease.         Latest Reference Range & Units 12/23/22 06:27   ALT 10 - 40 U/L 962 (H)   AST 15 - 37 U/L 3,162 (H)      Latest Reference Range & Units 12/23/22 09:10 12/23/22 09:11 12/23/22 09:38 12/23/22 13:26   POC Glucose 70 - 99 mg/dl 15 (LL) 15 (LL) 163 (H) 91      Latest Reference Range & Units 12/22/22 14:45 12/22/22 20:40 12/23/22 06:11   Lactic Acid, Sepsis 0.4 - 1.9 mmol/L 10.2 (HH) 5.4 (HH) 10.8 (HH)      Latest Reference Range & Units 2/7/16 21:30 2/9/16 20:33 12/22/22 23:07 12/23/22 08:28   Hemoglobin, Art, Extended 12.0 - 16.0 g/dL 12.8 11.9 (L) 9.0 (L) 9.8 (L)   pH, Arterial 7.350 - 7.450  7. 456 (H) 7.449 7.417 7.388   pCO2, Arterial 35.0 - 45.0 mmHg 44.7 48.7 (H) 21.4 (L) 22.9 (L)   pO2, Arterial 75.0 - 108.0 mmHg 72.0 (L) 67.6 (L) 161.7 (H) 144.7 (H)   HCO3, Arterial 21.0 - 29.0 mmol/L 30.8 (H) 33.0 (H) 13.5 (L) 13.5 (L)   TCO2 (calc), Art Not Established mmol/L 32.2 34.5 14.1 14.2   Base Excess, Arterial -3.0 - 3.0 mmol/L 6.1 (H) 7.9 (H) -9.6 (L) -10.0 (L)   O2 Sat, Arterial >92 % 94.8 93.8 98.3 98.5   O2 Content, Arterial Not Established mL/dL 17 16     Methemoglobin, Arterial <1.5 % 0.3 0.5 0.5 0.7   Carboxyhgb, Arterial 0.0 - 1.5 % 0.4 0.3 0.2 0.3     Blood Culture, Routine  Abnormal   Gram stain Aerobic bottle:   Gram negative rods   Information to follow   Gram stain Anaerobic bottle:   Gram negative rods   Information to follow   P      Organism Escherichia coli DNA Detected Abnormal  P    Blood Culture, Routine See additional report for complete BCID panel.      Organism Gram negative geovanny Abnormal     Urine Culture, Routine <10,000 CFU/ml   No further workup    Resulting Agency [address] [facility] [address] [facility] [address] [facility]           Narrative  Performed by: [facility]  ORDER#: [id number]                          ORDERED BY: Kylah Mora   SOURCE: Urine Clean Catch                  COLLECTED:  12/23/22 08:50   ANTIBIOTICS AT MICHAEL.:                      RECEIVED :  12/23/22 09:40              Latest Reference Range & Units 2/7/16 20:24 12/22/22 14:45 12/23/22 06:27 12/24/22 05:30   Albumin 3.4 - 5.0 g/dL 3.2 (L) 2.7 (L) 1.9 (L) 1.7 (L)   Globulin g/dL 3.9      Albumin/Globulin Ratio 1.1 - 2.2  0.8 (L) 0.7 (L) 0.7 (L) 0.7 (L)   Alk Phos 40 - 129 U/L 81 248 (H) 90 105   ALT 10 - 40 U/L 11 16 962 (H) 1,817 (H)   AST 15 - 37 U/L 21 37 3,162 (H) 4,285 (H)   Bilirubin 0.0 - 1.0 mg/dL <0.2 0.4 0.4 0.4   Bilirubin, Direct 0.0 - 0.3 mg/dL    <0.2   Bilirubin, Indirect 0.0 - 1.0 mg/dL    see below   Total Protein 6.4 - 8.2 g/dL 7.1 6.5 4.8 (L) 4.2 (L)       Assessment:  Principal Problem:    Severe sepsis (HCC)  Active Problems:    Other secondary scoliosis, thoracolumbar region    Lactic acidosis    Pyuria    Elevated alkaline phosphatase level    Metabolic acidemia    Elevated procalcitonin    Bandemia    SVT (supraventricular tachycardia) (HCC)    Hyperthyroidism    Bacteremia due to Escherichia coli    Hypoglycemia    Shock liver    Pulmonary infiltrates    Thrombocytopenia (HCC)    Spina bifida ([facility] [address]  Resolved Problems:    * No resolved hospital problems.  *          Plan:   Ventilator  support to keep saturation between 90 and 94% only  Ventilator settings and waveforms reviewed  Pulmonary toilet  IV sedation to maintain patient ventilator synchrony  Titration of sedation as per RASS scores  Continue IV meropenem  Patient has been given fluid boluses  Patient continues to be hypotensive and patient was started on IV pressors and titration of pressors as per mean arterial pressure more than 65 mmHg  Patient was tried on vasopressin instead of Levophed infusion as patient is having increased vasospasm and mottling of the extremities but patient's hemodynamics cannot and had to be restarted on Levophed for now  Patient may lose distal limbs-time will tell-vascular surgery on board  Patient also has had hypoglycemia and patient was given D50 W  Patient had hypoglycemic episode this morning and patient was given D50 W and also D10 infusion has been started-we will reassess patient's blood sugars  Patient's bicarb drip being changed to Plasma-Lyte  Patient has been profound thrombocytopenia  Patient's subcu heparin was discontinued and HIT panel was ordered on a stat basis  Trend the lactic acid levels  Monitor for any worsening renal insufficiency  Patient was started on bicarb drip and saline was discontinued  Patient does have history of hypothyroidism and patient also had intermittent SVT on this admission  Patient is getting Tapazole at this time-is to be continued-patient's TSH/T3 and T4 were within normal range  CT of the abdomen pelvis was reviewed  Strict input output charting  Patient's renal function is to monitor closely as patient has potential for further decompensation and MAYTE secondary to ATN  Patient's WBC count and lactic acid to be trended  Patient's urine culture shows patient to have gram-negative rods  Patient has mottling of the legs secondary to sepsis on pressors-vascular consult was requested by IM and recommendation reviewed  Patient has a shock liver and patient's LFTs to be trended and patient's LFTs worse as compared to yesterday  Wound care as per wound care team  PUD and DVT prophylaxis    Patient's clinical status is precarious and critical and has poor potential for further decompensation with potential for non survival    Case was discussed with patient's mother and ICU team    Critical care time spent with patient was 35 minutes exclusive of any procedures    Patient's clinical status deteriorates further - consider palliative care consult to discuss the goals of care and CODE STATUS        Electronically signed by:  Lizbeth Cisneros MD    12/24/2022    5:05 PM.

## 2022-12-24 NOTE — PROGRESS NOTES
Bilateral upper hand cyanosis noted on 12pm assessment. Cap refill > 3 seconds. Left side is worse than right. Called and spoke with Dr. Lorin Goldman and he said stop Levophed completely and increase Vasopressin to 0.04. Levophed stopped, Vaso increased.

## 2022-12-24 NOTE — PROGRESS NOTES
12/24/22 0412   Patient Observation   Heart Rate 85   Resp 16   SpO2 96 %   Breath Sounds   Right Upper Lobe Clear   Right Middle Lobe Clear   Right Lower Lobe Clear   Left Upper Lobe Clear   Left Lower Lobe Clear   Vent Information   Vent Mode AC/VC   Ventilator Settings   Vt (Set, mL) 400 mL   Resp Rate (Set) 16 bmp   PEEP/CPAP (cmH2O) 5   FiO2  40 %   Pressure Support (cm H2O) 0 cm H2O   Vent Patient Data (Readings)   Vt (Measured) 416 mL   Peak Inspiratory Pressure (cmH2O) 37 cmH2O   Rate Measured 16 br/min   Minute Volume (L/min) 6.65 Liters   Mean Airway Pressure (cmH2O) 12 cmH20   I:E Ratio 1:2.40   Flow Sensitivity 3 L/min   Backup Apnea On   Vent Alarm Settings   High Pressure (cmH2O) 40 cmH2O   Low Minute Volume (lpm) 2 L/min   Low Exhaled Vt (ml) 200 mL   RR High (bpm) 40 br/min   Apnea (secs) 20 secs   Additional Respiratoray Assessments   Humidification Source HME   Ambu Bag With Mask At Bedside Yes   ETT    Placement Date: 12/22/22   Present on Admission/Arrival: No  Placed By: Licensed provider  Placement Verified By: Auscultation;Colorimetric ETCO2 device; Chest X-ray  Preoxygenation: Yes  Mask Ventilation: Ventilated by mask (1)  Airway Type: Cuffed  A...    Secured At 22 cm   Measured From Lips   ETT Placement Left   Secured By Commercial tube wayne   Site Assessment Dry   Cuff Pressure 30 cm H2O

## 2022-12-24 NOTE — PROGRESS NOTES
12/23/22 2003   Patient Observation   Heart Rate 91   Resp 25   SpO2 96 %   Breath Sounds   Right Upper Lobe Clear   Right Middle Lobe Clear   Right Lower Lobe Clear   Left Upper Lobe Clear   Left Lower Lobe Clear   Vent Information   Vent Mode AC/VC   Ventilator Settings   Vt (Set, mL) 400 mL   Resp Rate (Set) 16 bmp   PEEP/CPAP (cmH2O) 5   FiO2  40 %   Pressure Support (cm H2O) 0 cm H2O   Vent Patient Data (Readings)   Vt (Measured) 412 mL   Peak Inspiratory Pressure (cmH2O) 31 cmH2O   Rate Measured 16 br/min   Minute Volume (L/min) 6.58 Liters   Mean Airway Pressure (cmH2O) 12 cmH20   I:E Ratio 1:2.40   Flow Sensitivity 3 L/min   Backup Apnea On   Vent Alarm Settings   High Pressure (cmH2O) 40 cmH2O   Low Minute Volume (lpm) 2 L/min   Low Exhaled Vt (ml) 200 mL   RR High (bpm) 40 br/min   Apnea (secs) 20 secs   Additional Respiratoray Assessments   Ambu Bag With Mask At Bedside Yes   ETT    Placement Date: 12/22/22   Present on Admission/Arrival: No  Placed By: Licensed provider  Placement Verified By: Auscultation;Colorimetric ETCO2 device; Chest X-ray  Preoxygenation: Yes  Mask Ventilation: Ventilated by mask (1)  Airway Type: Cuffed  A...    Secured At 22 cm   Measured From Lips   ETT Placement Right   Secured By Commercial tube wayne   Site Assessment Dry   Cuff Pressure 32 cm H2O

## 2022-12-24 NOTE — PROGRESS NOTES
12/24/22 1229   Patient Observation   Heart Rate 88   Resp 16   SpO2 95 %   Breath Sounds   Right Upper Lobe Clear   Right Middle Lobe Clear   Right Lower Lobe Clear   Left Upper Lobe Clear   Left Lower Lobe Clear   Vent Information   Vent Mode AC/VC   Ventilator Settings   Vt (Set, mL) 400 mL   Resp Rate (Set) 16 bmp   PEEP/CPAP (cmH2O) 5   FiO2  40 %   Pressure Support (cm H2O) 0 cm H2O   Vent Patient Data (Readings)   Vt (Measured) 413 mL   Peak Inspiratory Pressure (cmH2O) 37 cmH2O   Rate Measured 16 br/min   Minute Volume (L/min) 6.61 Liters   Mean Airway Pressure (cmH2O) 13 cmH20   I:E Ratio 1:2.40   Vent Alarm Settings   High Pressure (cmH2O) 55 cmH2O   Low Minute Volume (lpm) 2 L/min   Low Exhaled Vt (ml) 200 mL   RR High (bpm) 40 br/min   Apnea (secs) 20 secs   Additional Respiratoray Assessments   Humidification Source HME   Ambu Bag With Mask At Bedside Yes   ETT    Placement Date: 12/22/22   Present on Admission/Arrival: No  Placed By: Licensed provider  Placement Verified By: Auscultation;Colorimetric ETCO2 device; Chest X-ray  Preoxygenation: Yes  Mask Ventilation: Ventilated by mask (1)  Airway Type: Cuffed  A...    Secured At 22 cm   Measured From Lips   ETT [address] [facility]

## 2022-12-24 NOTE — PLAN OF CARE
Problem: Discharge Planning  Goal: Discharge to home or other facility with appropriate resources  12/24/2022 0222 by Kofi Arroyo RN  Outcome: Progressing  Flowsheets (Taken 12/23/2022 2030)  Discharge to home or other facility with appropriate resources:   Identify barriers to discharge with patient and caregiver   Arrange for needed discharge resources and transportation as appropriate   Identify discharge learning needs (meds, wound care, etc)   Arrange for interpreters to assist at discharge as needed   Refer to discharge planning if patient needs post-hospital services based on physician order or complex needs related to functional status, cognitive ability or social support system  12/23/2022 1452 by Domenica Huffman RN  Outcome: Progressing     Problem: Safety - Medical Restraint  Goal: Remains free of injury from restraints (Restraint for Interference with Medical Device)  Description: INTERVENTIONS:  1. Determine that other, less restrictive measures have been tried or would not be effective before applying the restraint  2. Evaluate the patient's condition at the time of restraint application  3. Inform patient/family regarding the reason for restraint  4.  Q2H: Monitor safety, psychosocial status, comfort, nutrition and hydration  12/24/2022 0222 by Kofi Arroyo RN  Outcome: Progressing  Flowsheets  Taken 12/23/2022 2000 by Kofi Arroyo RN  Remains free of injury from restraints (restraint for interference with medical device):   Determine that other, less restrictive measures have been tried or would not be effective before applying the restraint   Evaluate the patient's condition at the time of restraint application   Inform patient/family regarding the reason for restraint   Every 2 hours: Monitor safety, psychosocial status, comfort, nutrition and hydration  Taken 12/23/2022 1907 by Domenica Huffman RN  Remains free of injury from restraints (restraint for interference with medical device): Determine that other, less restrictive measures have been tried or would not be effective before applying the restraint   Evaluate the patient's condition at the time of restraint application   Inform patient/family regarding the reason for restraint   Every 2 hours: Monitor safety, psychosocial status, comfort, nutrition and hydration  Taken 12/23/2022 1800 by Eufemia Del Rio RN  Remains free of injury from restraints (restraint for interference with medical device):   Determine that other, less restrictive measures have been tried or would not be effective before applying the restraint   Evaluate the patient's condition at the time of restraint application   Inform patient/family regarding the reason for restraint   Every 2 hours: Monitor safety, psychosocial status, comfort, nutrition and hydration  12/23/2022 1452 by Eufemia Del Rio RN  Outcome: Progressing  Flowsheets (Taken 12/23/2022 1400)  Remains free of injury from restraints (restraint for interference with medical device):   Determine that other, less restrictive measures have been tried or would not be effective before applying the restraint   Evaluate the patient's condition at the time of restraint application   Inform patient/family regarding the reason for restraint   Every 2 hours: Monitor safety, psychosocial status, comfort, nutrition and hydration     Problem: Pain  Goal: Verbalizes/displays adequate comfort level or baseline comfort level  12/24/2022 0222 by Karin Matthews RN  Outcome: Progressing  12/23/2022 1452 by Eufemia Del Rio RN  Outcome: Progressing     Problem: Nutrition Deficit:  Goal: Optimize nutritional status  12/24/2022 0222 by Karin Matthews RN  Outcome: Progressing  12/23/2022 1452 by Eufemia Del Rio RN  Outcome: Not Progressing     Problem: Skin/Tissue Integrity  Goal: Absence of new skin breakdown  Description: 1. Monitor for areas of redness and/or skin breakdown  2. Assess vascular access sites hourly  3.   Every 4-6 hours minimum:  Change oxygen saturation probe site  4. Every 4-6 hours:  If on nasal continuous positive airway pressure, respiratory therapy assess nares and determine need for appliance change or resting period.   Outcome: Progressing     Problem: Nutrition Deficit:  Goal: Optimize nutritional status  12/24/2022 0222 by Jl Landis RN  Outcome: Progressing  12/23/2022 1452 by Johana Real RN  Outcome: Not Progressing

## 2022-12-25 NOTE — PROGRESS NOTES
Hospitalist Progress Note      PCP: Ramond Schaumann, DO    Date of Admission: 12/22/2022    Chief Complaint: [address] Course:   49 y.o. female who presented to [facility] with AMS. Patient has a history of spinal bifida and mental retardation. She is wheel chair bound and her mother is her caregiver and decision maker. Per patient's mother, patient started seeming fatigued a few hours ago. This quickly progressed and patient started to seem somnolent. By the time she presented to the ED, patient was unarousable. She had a high fever and severe tachycardia. She regained consciousness once IVF were started. Patient has a neurogenic bladder and straight caths multiple times per day. She had evidence of UTI on a UA. Patient had good response to IVF but remains critically ill in ED.     Subjective: intubated, sedated       Medications:  Reviewed    Infusion Medications    vasopressin (Septic Shock) infusion 0.04 Units/min (12/25/22 0215)    electrolyte-A 125 mL/hr at 12/25/22 0118    fentaNYL 150 mcg/hr (12/25/22 2524)    dextrose      sodium chloride      dexmedetomidine 1 mcg/kg/hr (12/25/22 0631)    sodium chloride      norepinephrine 30 mcg/min (12/25/22 8641)     Scheduled Medications    mupirocin   Nasal BID    chlorhexidine  15 mL Mouth/Throat BID    carboxymethylcellulose PF  1 drop Both Eyes 6 times per day    meropenem  1,000 mg IntraVENous Q12H    collagenase   Topical Daily    methIMAzole  5 mg Oral Daily    sodium chloride flush  5-40 mL IntraVENous 2 times per day    lidocaine 1 % injection  5 mL IntraDERmal Once    pantoprazole  40 mg IntraVENous Daily     PRN Meds: magnesium sulfate, potassium chloride, dextrose, glucose, dextrose bolus **OR** dextrose bolus, glucagon (rDNA), dextrose, sodium chloride flush, sodium chloride, acetaminophen **OR** acetaminophen, sodium chloride      Intake/Output Summary (Last 24 hours) at 12/25/2022 1556  Last data filed at 12/25/2022 0600  Gross per 24 hour   Intake 1506.64 ml   Output 495 ml   Net 1011.64 ml       Physical Exam Performed:    BP (!) 51/26   Pulse 87   Temp (!) 101.3 °F (38.5 °C) (Bladder)   Resp 16   Ht 4' (1.219 m)   Wt 81 lb 12.7 oz (37.1 kg)   SpO2 96%   BMI 24.96 kg/m²     General appearance:  intubated, sedated  HEENT:  Normal cephalic, atraumatic without obvious deformity. Pupils equal, round, and reactive to light. Extra ocular muscles intact. Conjunctivae/corneas clear. Neck: Supple, with full range of motion. No jugular venous distention. Trachea midline. Respiratory:  Normal respiratory effort. Clear to auscultation, bilaterally without Rales/Wheezes/Rhonchi. Cardiovascular:  tachycardia, regular rhythm with normal S1/S2 without murmurs, rubs or gallops. Abdomen: Soft, non-tender, non-distended with normal bowel sounds. Musculoskeletal:  No clubbing, cyanosis or edema bilaterally. Full range of motion without deformity. Chronic LE contractures, poor tone. Feet cyanotic with diffuse mottling  Skin: Skin color, texture, turgor normal.  No rashes or lesions. Neurologic:  Neurovascularly intact without any focal sensory/motor deficits.  Cranial nerves: II-XII intact, grossly non-focal.  Psychiatric:  sedated  Capillary Refill: Brisk,3 seconds, normal  Peripheral Pulses: +2 palpable, equal bilaterally        Labs:   Recent Labs     12/23/22  0627 12/24/22  0530 12/25/22  0400   WBC 32.4* 31.3* 24.4*   HGB 8.5* 7.9* 7.2*   HCT 28.3* 25.5* 23.1*   * 119* 110*     Recent Labs     12/23/22  0627 12/24/22  0530 12/24/22  1522 12/25/22  0400    133*  --  132*   K 4.2 3.2* 3.8 3.3*    94*  --  89*   CO2 13* 24  --  23   BUN 21* 27*  --  29*   CREATININE 1.0 1.1  --  1.2*   CALCIUM 6.2* 5.6*  --  6.1*     Recent Labs     12/23/22  0627 12/24/22  0530 12/25/22  0400   AST 3,162* 4,285* 1,125*   * 1,817* 1,167*   BILIDIR  --  <0.2 0.4*   BILITOT 0.4 0.4 0.6   ALKPHOS 90 105 330*     No results for input(s): INR in the last 72 hours. No results for input(s): Cici Tristan in the last 72 hours. Urinalysis:      Lab Results   Component Value Date/Time    NITRU Negative 12/22/2022 03:05 PM    WBCUA >100 12/22/2022 03:05 PM    BACTERIA 4+ 12/22/2022 03:05 PM    RBCUA None seen 12/22/2022 03:05 PM    BLOODU LARGE 12/22/2022 03:05 PM    SPECGRAV >=1.030 12/22/2022 03:05 PM    GLUCOSEU Negative 12/22/2022 03:05 PM       Radiology:  XR ABDOMEN (KUB) (SINGLE AP VIEW)   Final Result   Limited study. Distended loops of bowel are noted. This has worsened. Findings could be related to ileus versus bowel obstruction. XR CHEST PORTABLE   Final Result   1. Right base opacity with small pleural effusion. If patient has clinical   symptoms of infection, differential would include pneumonia. In the absence   of infectious symptoms, atelectasis can have the same appearance. .   2. ETT tip at the level of the susanne. CT ABDOMEN PELVIS WO CONTRAST Additional Contrast? None   Final Result   1. Technically limited CT due to the patient's underlying medical condition   and artifact from positioning, hardware and lack of IV contrast.   2. Probable sacral decubitus ulcer for which correlation with physical exam   is advised. 3. Questionable inflammation at the left hip joint which may simply be due to   the patient's underlying medical condition and stasis. Osteoarthritis or   septic pattern of arthritis cannot be definitively excluded. 4. Heterogeneity of the right kidney that is difficult to evaluate. An   underlying pattern of inflammation/pyelonephritis may be considered in the   appropriate clinical setting. No obvious sign of obstructive uropathy. 5. Bibasilar pleural fluid and airspace changes. Correlate with pulmonary   symptoms.          XR ABDOMEN (KUB) (SINGLE AP VIEW)   Final Result   Enteric tube is in the stomach         XR CHEST PORTABLE   Final Result   Endotracheal tube is at the susanne XR CHEST PORTABLE   Final Result   No radiographic evidence of acute pulmonary disease. IP CONSULT TO HOSPITALIST  IP CONSULT TO CRITICAL CARE  IP CONSULT TO CRITICAL CARE  IP CONSULT TO VASCULAR SURGERY    Assessment/Plan:    Active Hospital Problems    Diagnosis     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]      Priority: Medium    Lactic acidosis [E87.20]      Priority: Medium    Pyuria [R82.81]      Priority: Medium    Elevated alkaline phosphatase level [R74.8]      Priority: Medium    Metabolic acidemia [H29.00]      Priority: Medium    Elevated procalcitonin [R79.89]      Priority: Medium    Bandemia [D72.825]      Priority: Medium    SVT (supraventricular tachycardia) (HCC) [I47.1]      Priority: Medium    Hyperthyroidism [E05.90]      Priority: Medium    Spina bifida ([facility] [Q05.9]      Septic shock  - presented with tachycardia, fever, leukocytosis.  Lactate 10.2 on admission  - continue IVF  - blood, urine cultures positive of E coli  - suspect pyelonephritis based on CT abd/pel and culture results  - wean pressors as able  - continue merrem    Acute hypoxic respiratory failure  - 2/2 above  - pulmonology consulted  - intubated 12/22  - vent management per pulm     Acute metabolic encephalopathy  - 2/2 above  - supportive care     Lactic acidosis  - severe, 2/2 sepsis  - continue bicarb gtt  - vascular surgery consulted for severe mottling  - no surgical intervention warranted  - continue to monitor    MAYTE  - 2/2 shock  - nephrology consulted  - Cr trending up consistently but slowly likely related to extremely small mass  - continues with oliguria  - continue IVF  - continue to monitor    Limb ischemia  - due to shock and pressor use  - vascular surgery consulted  - high risk for loss of viability  - no plan for surgical intervention     Hyperthyroidism  - TSH WNL  - continue home methimazole     Anemia  - chronic, stable  - holding home Fe supplement  - continue to monitor     HTN  - hypotension on admission  - holding metoprolol     Spina Bifida  - paraplegic at baseline  - supportive care    DVT Prophylaxis: lovenox  Diet: ADULT TUBE FEEDING; Orogastric; Renal Formula; Continuous; 20; Yes; 0; Other (specify); trophic; 20; 30; Q 4 hours  Code Status: Full Code  PT/OT Eval Status: ordered    Dispo - ICU    Appropriate for A1 Discharge Unit: No      Bal Addison MD

## 2022-12-25 NOTE — PROGRESS NOTES
12/25/22 1614   Patient Observation   Heart Rate 97   Resp 16   SpO2 94 %   Breath Sounds   Right Upper Lobe Clear   Right Middle Lobe Diminished   Right Lower Lobe Diminished   Left Upper Lobe Clear   Left Lower Lobe Diminished   Vent Information   Equipment Changed HME   Vent Mode AC/PC   Ventilator Settings   Resp Rate (Set) 16 bmp   PEEP/CPAP (cmH2O) 5   FiO2  40 %   Pressure Ordered 40   Vent Patient Data (Readings)   Vt (Measured) 384 mL   Peak Inspiratory Pressure (cmH2O) 45 cmH2O   Rate Measured 16 br/min   Minute Volume (L/min) 4.9 Liters   Mean Airway Pressure (cmH2O) 16 cmH20   I:E Ratio 1:2.4   Flow Sensitivity 3 L/min   I Time/ I Time % 1.1 s   Backup Apnea On   Backup Rate 16 Breaths Per Minute   Vent Alarm Settings   High Pressure (cmH2O) 55 cmH2O   Low Minute Volume (lpm) 2 L/min   Low Exhaled Vt (ml) 100 mL   High Exhaled Vt (ml) 1000 mL   RR High (bpm) 40 br/min   Apnea (secs) 20 secs   Additional Respiratoray Assessments   Humidification Source HME   Ambu Bag With Mask At Bedside Yes   Airway Clearance   Suction ET Tube;Oral   Suction Device Rosalia; Inline suction catheter   Sputum Method Obtained Endotracheal   Sputum Amount Small   Sputum Color/Odor   (cloudy)   Sputum Consistency Thick   ETT    Placement Date: 12/22/22   Present on Admission/Arrival: No  Placed By: Licensed provider  Placement Verified By: Auscultation;Colorimetric ETCO2 device; Chest X-ray  Preoxygenation: Yes  Mask Ventilation: Ventilated by mask (1)  Airway Type: Cuffed  A...    Secured At 22 cm   Measured From Lips   ETT Placement Right   Secured By Commercial tube wayne   Site Assessment Dry   Ventilator Associated Pneumonia Bundle   Oral Care Performed Mouth suctioned

## 2022-12-25 NOTE — PROGRESS NOTES
12/24/22 2048   Patient Observation   Heart Rate 94   Resp 16   SpO2 95 %   Breath Sounds   Right Upper Lobe Clear   Right Middle Lobe Clear   Right Lower Lobe Clear   Left Upper Lobe Clear   Left Lower Lobe Clear   Vent Information   Vent Mode AC/VC   Ventilator Settings   Vt (Set, mL) 400 mL   Resp Rate (Set) 16 bmp   PEEP/CPAP (cmH2O) 5   FiO2  40 %   Pressure Support (cm H2O) 0 cm H2O   Vent Patient Data (Readings)   Vt (Measured) 416 mL   Peak Inspiratory Pressure (cmH2O) 40 cmH2O   Rate Measured 16 br/min   Minute Volume (L/min) 6.66 Liters   Mean Airway Pressure (cmH2O) 14 cmH20   I:E Ratio 1:2.40   Vent Alarm Settings   High Pressure (cmH2O) 55 cmH2O   Low Minute Volume (lpm) 2 L/min   Low Exhaled Vt (ml) 200 mL   RR High (bpm) 40 br/min   Additional Respiratoray Assessments   Humidification Source HME   Ambu Bag With Mask At Bedside Yes   Airway Clearance   Suction ET Tube   Suction Device Inline suction catheter   Sputum Method Obtained Endotracheal   Sputum Amount Small   Sputum Color/Odor Tan   Sputum Consistency Thick   ETT    Placement Date: 12/22/22   Present on Admission/Arrival: No  Placed By: Licensed provider  Placement Verified By: Auscultation;Colorimetric ETCO2 device; Chest X-ray  Preoxygenation: Yes  Mask Ventilation: Ventilated by mask (1)  Airway Type: Cuffed  A...    Secured At 25 cm   Measured From Lips   ETT Placement Center   Secured By Commercial tube wayne   Site Assessment Dry   Cuff Pressure 32 cm H2O

## 2022-12-25 NOTE — PROGRESS NOTES
INPATIENT PULMONARY CRITICAL CARE PROGRESS NOTE      Reason for visit    severe sepsis    SUBJECTIVE: Patient when seen this morning continues to be critically ill on mechanical ventilatory support, patient was on 50% oxygen and PEEP of 5 to maintain oxygen saturation, patient has modest to moderate secretions in the endotracheal tube, patient is now spiking fever T-max of 101.3 °F, patient continues to be on IV pressors to maintain hemodynamics, patient continues to be on IV sedation to maintain patient ventilator synchrony, patient has sinus rhythm on the monitor which ranges from normal sinus rhythm to sinus tachycardia, patient failed CPAP trial this morning, patient continues to have increased mottling of the extremities, patient has low urine output with cumulative fluid balance of +8.8 L, patient is tolerating enteral feeds, and patient does not have any hypoglycemia at this time, no other pertinent review of system of concern could be obtained     Physical Exam:  Blood pressure (!) 51/26, pulse 87, temperature (!) 101.3 °F (38.5 °C), temperature source Bladder, resp. rate 16, height 4' (1.219 m), weight 81 lb 12.7 oz (37.1 kg), SpO2 96 %, not currently breastfeeding.'     Constitutional: No profound respiratory distress on mechanical ventilator support  HENT: Endotracheal tube present. No thyromegaly. Eyes:  Conjunctivae are normal. Pupils equal, round, and reactive to light. No scleral icterus. Neck: . No tracheal deviation present. No obvious thyroid mass. Cardiovascular: Sinus rhythm. No lower extremity edema. Pulmonary/Chest: No wheezes. Bilateral rales. Chest wall is not dull to percussion. No use of accessory muscle respiration  Abdominal: Profoundly distended a abdomen when seen  Musculoskeletal: No cyanosis. No clubbing. Spina bifida with scoliosis Patient has atrophic lower extremities  Lymphadenopathy: No cervical or supraclavicular adenopathy.    Skin: Mottling of the skin especially in the distal aspect of lower extremities which is worsening along with that patient also has slight duskiness of the left fingers, sacral decub  Neurologic intubated and sedated      Results:  CBC:   Recent Labs     12/23/22  0627 12/24/22  0530 12/25/22  0400   WBC 32.4* 31.3* 24.4*   HGB 8.5* 7.9* 7.2*   HCT 28.3* 25.5* 23.1*   MCV 81.1 77.0* 76.6*   * 119* 110*       BMP:   Recent Labs     12/23/22  0627 12/24/22  0530 12/24/22  1522 12/25/22  0400    133*  --  132*   K 4.2 3.2* 3.8 3.3*    94*  --  89*   CO2 13* 24  --  23   BUN 21* 27*  --  29*   CREATININE 1.0 1.1  --  1.2*       LIVER PROFILE:   Recent Labs     12/23/22  0627 12/24/22  0530 12/25/22  0400   AST 3,162* 4,285* 1,125*   * 1,817* 1,167*   BILIDIR  --  <0.2 0.4*   BILITOT 0.4 0.4 0.6   ALKPHOS 90 105 330*         UA:  Recent Labs     12/22/22  1505   COLORU Yellow   PHUR 6.0   WBCUA >100*   RBCUA None seen   BACTERIA 4+*   CLARITYU CLOUDY*   SPECGRAV >=1.030   LEUKOCYTESUR MODERATE*   UROBILINOGEN 1.0   BILIRUBINUR Negative   BLOODU LARGE*   GLUCOSEU Negative         Imaging:  I have reviewed radiology images personally. XR ABDOMEN (KUB) (SINGLE AP VIEW)   Final Result   Limited study. Distended loops of bowel are noted. This has worsened. Findings could be related to ileus versus bowel obstruction. XR CHEST PORTABLE   Final Result   1. Right base opacity with small pleural effusion. If patient has clinical   symptoms of infection, differential would include pneumonia. In the absence   of infectious symptoms, atelectasis can have the same appearance. .   2. ETT tip at the level of the susanne. CT ABDOMEN PELVIS WO CONTRAST Additional Contrast? None   Final Result   1. Technically limited CT due to the patient's underlying medical condition   and artifact from positioning, hardware and lack of IV contrast.   2. Probable sacral decubitus ulcer for which correlation with physical exam   is advised.    3. Questionable inflammation at the left hip joint which may simply be due to   the patient's underlying medical condition and stasis. Osteoarthritis or   septic pattern of arthritis cannot be definitively excluded. 4. Heterogeneity of the right kidney that is difficult to evaluate. An   underlying pattern of inflammation/pyelonephritis may be considered in the   appropriate clinical setting. No obvious sign of obstructive uropathy. 5. Bibasilar pleural fluid and airspace changes. Correlate with pulmonary   symptoms. XR ABDOMEN (KUB) (SINGLE AP VIEW)   Final Result   Enteric tube is in the stomach         XR CHEST PORTABLE   Final Result   Endotracheal tube is at the susanne         XR CHEST PORTABLE   Final Result   No radiographic evidence of acute pulmonary disease. Blood Culture, Routine  Abnormal   Gram stain Aerobic bottle:   Gram negative rods   Information to follow   Gram stain Anaerobic bottle:   Gram negative rods   Information to follow   P      Organism Escherichia coli DNA Detected Abnormal  P    Blood Culture, Routine See additional report for complete BCID panel.      Organism Gram negative geovanny Abnormal     Urine Culture, Routine <10,000 CFU/ml   No further workup    Resulting Agency [facility]           Narrative  Performed by: [facility]  ORDER#: [id number]                          ORDERED BY: Terrence Patel   SOURCE: Urine Clean Catch                  COLLECTED:  12/23/22 08:50   ANTIBIOTICS AT MICHAEL.:                      RECEIVED :  12/23/22 09:40            Latest Reference Range & Units 12/23/22 08:28 12/24/22 06:55 12/25/22 04:26   Hemoglobin, Art, Extended 12.0 - 16.0 g/dL 9.8 (L) 9.1 (L) 9.1 (L)   pH, Arterial 7.350 - 7.450  7.388 7.524 (H) 7.476 (H)   pCO2, Arterial 35.0 - 45.0 mmHg 22.9 (L) 31.0 (L) 33.8 (L)   pO2, Arterial 75.0 - 108.0 mmHg 144.7 (H) 133.5 (H) 90.4   HCO3, Arterial 21.0 - 29.0 mmol/L 13.5 (L) 25.0 24.4   TCO2 (calc), Art Not Established mmol/L 14.2 25.9 25.4   Base Excess, Arterial -3.0 - 3.0 mmol/L -10.0 (L) 2.4 1.0   O2 Sat, Arterial >92 % 98.5 98.3 96.5   Methemoglobin, Arterial <1.5 % 0.7 0.7 0.5   Carboxyhgb, Arterial 0.0 - 1.5 % 0.3 0.3 0.3     ONE XRAY VIEW OF THE CHEST       12/25/2022 6:24 am       COMPARISON:   December 22, 2022       HISTORY:   ORDERING SYSTEM PROVIDED HISTORY: on vent   TECHNOLOGIST PROVIDED HISTORY:   Reason for exam:->on vent   Reason for Exam: ETT placement       FINDINGS:   ETT tip is at the susanne. Enteric catheter is partially obscured by vertical   spinal rods but appears to extend below the diaphragm. Cardiac silhouette is within normal range when accounting for rotation. Right base opacity with small pleural effusion. No visible pneumothorax. Examination limited by rotation and position. Impression   1. Right base opacity with small pleural effusion. If patient has clinical   symptoms of infection, differential would include pneumonia. In the absence   of infectious symptoms, atelectasis can have the same appearance. .   2. ETT tip at the level of the susanne.           Latest Reference Range & Units 12/22/22 14:45 12/23/22 06:27 12/24/22 05:30 12/25/22 04:00   Albumin 3.4 - 5.0 g/dL 2.7 (L) 1.9 (L) 1.7 (L) 1.7 (L)   Albumin/Globulin Ratio 1.1 - 2.2  0.7 (L) 0.7 (L) 0.7 (L) 0.7 (L)   Alk Phos 40 - 129 U/L 248 (H) 90 105 330 (H)   ALT 10 - 40 U/L 16 962 (H) 1,817 (H) 1,167 (H)   AST 15 - 37 U/L 37 3,162 (H) 4,285 (H) 1,125 (H)   Bilirubin 0.0 - 1.0 mg/dL 0.4 0.4 0.4 0.6   Bilirubin, Direct 0.0 - 0.3 mg/dL   <0.2 0.4 (H)   Bilirubin, Indirect 0.0 - 1.0 mg/dL   see below 0.2   Total Protein 6.4 - 8.2 g/dL 6.5 4.8 (L) 4.2 (L) 4.2 (L)       Assessment:  Principal Problem:    Severe sepsis (HCC)  Active Problems:    Other secondary scoliosis, thoracolumbar region    Lactic acidosis    Pyuria    Elevated alkaline phosphatase level    Metabolic acidemia    Elevated procalcitonin Bandemia    SVT (supraventricular tachycardia) (HCC)    Hyperthyroidism    Bacteremia due to Escherichia coli    Hypoglycemia    Shock liver    Pulmonary infiltrates    Thrombocytopenia (HCC)    Spina bifida ([address]  Resolved Problems:    * No resolved hospital problems.  *          Plan:   Ventilator  support to keep saturation between 90 and 94% only  Ventilator settings and waveforms reviewed  Pulmonary toilet  Patient continues to spike fever and patient's x-ray chest shows worsening pulm infiltrates-patient will benefit from a bronchoscopy for diagnostic and therapeutic purposes-we will arrange that for today  IV sedation to maintain patient ventilator synchrony  Titration of sedation as per RASS scores  Continue IV meropenem for now and titration as per clinical status and cultures  Patient has been given fluid boluses  Patient continues to be hypotensive and patient was started on IV pressors and titration of pressors as per mean arterial pressure more than 65 mmHg  Patient continues to have increased mottling of the extremities secondary to hypoperfusion  Patient may lose distal limbs-time will tell-vascular surgery on board  Patient does not have any more hypoglycemia and patient is off D10W infusion   Continue Plasma-Lyte infusion  Patient has been profound thrombocytopenia  Patient's subcu heparin was discontinued and HIT panel was ordered on a stat basis  Trend the lactic acid levels  Monitor for any worsening renal insufficiency  Patient was started on bicarb drip and saline was discontinued  Patient does have history of hypothyroidism and patient also had intermittent SVT on this admission  Patient is getting Tapazole at this time-is to be continued-patient's TSH/T3 and T4 were within normal range  CT of the abdomen pelvis was reviewed  Strict input output charting  Patient's renal function is to monitor closely as patient has potential for further decompensation and MAYTE secondary to ATN  Patient's WBC count and lactic acid to be trended  Patient's urine culture shows patient to have gram-negative rods  Patient has a shock liver and patient's LFTs to be trended and patient's LFTs worse as compared to yesterday  Wound care as per wound care team  PUD and DVT prophylaxis    Patient's clinical status is precarious and critical and has poor potential for further decompensation with potential for non survival    Case was discussed with  ICU team    Critical care time spent with patient was 35 minutes exclusive of any procedures    Patient's clinical status deteriorates further - consider palliative care consult to discuss the goals of care and CODE STATUS        Electronically signed by:  BRETT WEBER MD    12/25/2022    8:39 AM.

## 2022-12-25 NOTE — PROGRESS NOTES
ETT pulled out 1.5 cm, secured at the 22 center lip ruby per Dr. Prashant Austin verbal order,                OG re taped by RN.

## 2022-12-25 NOTE — PROGRESS NOTES
KUB and CXR order noted. 1L fluid bolus given for hypotension per order. Levophed titrated to attempt to reach goal. Tylenol given for fever as well.      Electronically signed by Mary Leo RN on 12/25/2022 at 6:30 AM

## 2022-12-25 NOTE — PROGRESS NOTES
Respiratory Therapy Bronchoscopy Assist      Name:  Devyn [facility] Record Number:  [id number]  Age: 49 y.o. Gender: female  : 1975  Today's Date:  2022  Room:  [address] [place] [address]      BAL cath sample from both lung fields during bronchoscopy assist with patient on 100%. Sterile field maintained throughout procedure. Patient was pre-sedated. 180 mL of saline instilled. 8 cc of 20% mucomyst instilled during procedure. 55 mL of cloudy fluid obtained. Pt tolerated procedure well. Samples sent to lab for testing. Patient SpO2 within acceptable range throughout bronchscopy procedure. Physician assisted with bronch from [phone] PM to 1419 without complications.  Bronchoscope ID: REF [id number] - [id number], LOT [id number]    Patient/caregiver was educated on the proper method of use:  No: PT ON VENTILATOR      Level of patient/caregiver understanding able to:   [] Verbalize understanding   [] Demonstrate understanding       [] Teach back        [] Needs reinforcement       []  No available caregiver               []  Other:     Response to education:   PT ON VENTILATOR      Electronically signed by Bere Garber RCP on 2022 at 2:13 PM

## 2022-12-25 NOTE — PROGRESS NOTES
Latest Reference Range & Units 12/25/22 04:26   Hemoglobin, Art, Extended 12.0 - 16.0 g/dL 9.1 (L)   pH, Arterial 7.350 - 7.450  7.476 (H)   pCO2, Arterial 35.0 - 45.0 mmHg 33.8 (L)   pO2, Arterial 75.0 - 108.0 mmHg 90.4   HCO3, Arterial 21.0 - 29.0 mmol/L 24.4   TCO2 (calc), Art Not Established mmol/L 25.4   Base Excess, Arterial -3.0 - 3.0 mmol/L 1.0   O2 Sat, Arterial >92 % 96.5   Methemoglobin, Arterial <1.5 % 0.5   Carboxyhgb, Arterial 0.0 - 1.5 % 0.3   (L): Data is abnormally low  (H): Data is abnormally high    MD aware ABG results and 9.2 critical lactic acid. Discussed with MD about pt's cuff pressure reading, will titrate levophed per order. Sedation titrated to keep RASS goal and episodic desaturation.     Electronically signed by Mary Leo RN on 12/25/2022 at 5:03 AM

## 2022-12-25 NOTE — PROGRESS NOTES
12/25/22 1614   Patient Observation   Heart Rate 97   Resp 16   SpO2 94 %   Observations (S)  center upper lip ett wayne marks slight red, ett moved to right side, rn made aware   Breath Sounds   Right Upper Lobe Clear   Right Middle Lobe Diminished   Right Lower Lobe Diminished   Left Upper Lobe Clear   Left Lower Lobe Diminished   Vent Information   Equipment Changed HME   Vent Mode AC/PC   Ventilator Settings   Resp Rate (Set) 16 bmp   PEEP/CPAP (cmH2O) 5   FiO2  40 %   Pressure Support (cm H2O) 0 cm H2O   Pressure Ordered 40   Vent Patient Data (Readings)   Vt (Measured) 384 mL   Peak Inspiratory Pressure (cmH2O) 45 cmH2O   Rate Measured 16 br/min   Minute Volume (L/min) 4.9 Liters   Mean Airway Pressure (cmH2O) 16 cmH20   I:E Ratio 1:2.4   Flow Sensitivity 3 L/min   I Time/ I Time % 1.1 s   Backup Apnea On   Backup Rate 16 Breaths Per Minute   Vent Alarm Settings   High Pressure (cmH2O) 55 cmH2O   Low Minute Volume (lpm) 2 L/min   Low Exhaled Vt (ml) 100 mL   High Exhaled Vt (ml) 1000 mL   RR High (bpm) 40 br/min   Apnea (secs) 20 secs   Additional Respiratoray Assessments   Humidification Source HME   Ambu Bag With Mask At Bedside Yes   Airway Clearance   Suction ET Tube;Oral   Suction Device Rosalia; Inline suction catheter   Sputum Method Obtained Endotracheal   Sputum Amount Small   Sputum Color/Odor   (cloudy)   Sputum Consistency Thick   ETT    Placement Date: 12/22/22   Present on Admission/Arrival: No  Placed By: Licensed provider  Placement Verified By: Auscultation;Colorimetric ETCO2 device; Chest X-ray  Preoxygenation: Yes  Mask Ventilation: Ventilated by mask (1)  Airway Type: Cuffed  A...    Secured At 22 cm   Measured From Lips   ETT Placement Right   Secured By Commercial tube wayne   Site Assessment Dry   Ventilator Associated Pneumonia Bundle   Oral Care Performed Mouth suctioned

## 2022-12-25 NOTE — PROGRESS NOTES
12/25/22 1137   Patient Observation   Heart Rate (!) 117   Resp 11   SpO2 92 %   Breath Sounds   Right Upper Lobe Diminished   Right Middle Lobe Diminished   Right Lower Lobe Diminished   Left Upper Lobe Expiratory Wheezes; Diminished   Left Lower Lobe Diminished   Vent Information   Vent Mode (S)  AC/PC  (verbal order by Dr Isadora Baez)   Ventilator Settings   Resp Rate (Set) 16 bmp   PEEP/CPAP (cmH2O) 5   FiO2  50 %   Insp Time (sec) 1.1 sec   Pressure Ordered 30   Vent Patient Data (Readings)   Vt (Measured) 347 mL   Peak Inspiratory Pressure (cmH2O) 35 cmH2O   Rate Measured 19 br/min   Minute Volume (L/min) 2.93 Liters   Mean Airway Pressure (cmH2O) 14 cmH20   I:E Ratio 1:2.4   Flow Sensitivity 3 L/min   I Time/ I Time % 1.1 s   Backup Apnea On   Backup Rate 16 Breaths Per Minute   Vent Alarm Settings   High Pressure (cmH2O) 45 cmH2O   Low Exhaled Vt (ml) 100 mL   High Exhaled Vt (ml) 1000 mL   RR High (bpm) 40 br/min   Additional Respiratoray Assessments   Humidification Source HME   Airway Clearance   Suction ET Tube;Oral   Suction Device Inline suction catheter   Sputum Method Obtained Endotracheal   Sputum Amount Small   Sputum Color/Odor   (cloudy)   Sputum Consistency Thick   ETT    Placement Date: 12/22/22   Present on Admission/Arrival: No  Placed By: Licensed provider  Placement Verified By: Auscultation;Colorimetric ETCO2 device; Chest X-ray  Preoxygenation: Yes  Mask Ventilation: Ventilated by mask (1)  Airway Type: Cuffed  A...    Secured At 25 cm   Measured From Lips   ETT Placement Left   Secured By Commercial tube paiz   Site Assessment Dry   Cuff Pressure 34 cm H2O   Tie/Paiz Changed No   Ventilator Associated Pneumonia Bundle   Oral Care Performed Mouth suctioned   Respiratory   Respiratory Interventions (S)    (verbal order received to change pt to PC BY DR WEBER, Pi 30, Ti 1.1)

## 2022-12-25 NOTE — PROGRESS NOTES
12/25/22 0345   Patient Observation   Heart Rate 97   Resp 12   SpO2 97 %   Breath Sounds   Right Upper Lobe Clear   Right Middle Lobe Diminished   Right Lower Lobe Diminished   Left Upper Lobe Clear   Left Lower Lobe Diminished   Vent Information   Equipment Changed HME   Vent Mode AC/VC   Ventilator Settings   Vt (Set, mL) 400 mL   Resp Rate (Set) 16 bmp   PEEP/CPAP (cmH2O) 5   FiO2  50 %   Pressure Support (cm H2O) 0 cm H2O   Vent Patient Data (Readings)   Vt (Measured) 405 mL   Peak Inspiratory Pressure (cmH2O) 39 cmH2O   Rate Measured 16 br/min   Minute Volume (L/min) 6.23 Liters   Mean Airway Pressure (cmH2O) 14 cmH20   I:E Ratio 1:2.40   Vent Alarm Settings   High Pressure (cmH2O) 55 cmH2O   Low Minute Volume (lpm) 2 L/min   Low Exhaled Vt (ml) 200 mL   RR High (bpm) 40 br/min   Additional Respiratoray Assessments   Humidification Source HME   Ambu Bag With Mask At Bedside Yes   Airway Clearance   Suction ET Tube   Suction Device Inline suction catheter   Sputum Method Obtained Endotracheal   Sputum Amount Small   Sputum Color/Odor Tan   Sputum Consistency Thick   ETT    Placement Date: 12/22/22   Present on Admission/Arrival: No  Placed By: Licensed provider  Placement Verified By: Auscultation;Colorimetric ETCO2 device; Chest X-ray  Preoxygenation: Yes  Mask Ventilation: Ventilated by mask (1)  Airway Type: Cuffed  A...    Secured At 24 cm   Measured From Lips   ETT Placement Center   Secured By Commercial tube wayne   Site Assessment Dry   Cuff Pressure 32 cm H2O

## 2022-12-25 NOTE — PROGRESS NOTES
12/24/22 2343   Patient Observation   Heart Rate 94   Resp 16   SpO2 96 %   Breath Sounds   Right Upper Lobe Rhonchi   Right Middle Lobe Diminished   Right Lower Lobe Diminished   Left Upper Lobe Rhonchi   Left Lower Lobe Diminished   Vent Information   Vent Mode AC/VC   Ventilator Settings   Vt (Set, mL) 400 mL   Resp Rate (Set) 16 bmp   PEEP/CPAP (cmH2O) 5   FiO2  40 %   Pressure Support (cm H2O) 0 cm H2O   Vent Patient Data (Readings)   Vt (Measured) 415 mL   Peak Inspiratory Pressure (cmH2O) 41 cmH2O   Rate Measured 16 br/min   Minute Volume (L/min) 6.64 Liters   Mean Airway Pressure (cmH2O) 14 cmH20   I:E Ratio 1:2.40   Vent Alarm Settings   High Pressure (cmH2O) 55 cmH2O   Low Minute Volume (lpm) 2 L/min   Low Exhaled Vt (ml) 200 mL   RR High (bpm) 40 br/min   ETT    Placement Date: 12/22/22   Present on Admission/Arrival: No  Placed By: Licensed provider  Placement Verified By: Auscultation;Colorimetric ETCO2 device; Chest X-ray  Preoxygenation: Yes  Mask Ventilation: Ventilated by mask (1)  Airway Type: Cuffed  A...    Secured At 25 cm   Measured From Lips   ETT Placement Left   Secured By Commercial tube wayne   Site Assessment Dry   Cuff Pressure 34 cm H2O

## 2022-12-25 NOTE — PLAN OF CARE
Problem: Discharge Planning  Goal: Discharge to home or other facility with appropriate resources  Outcome: Progressing     Problem: Safety - Medical Restraint  Goal: Remains free of injury from restraints (Restraint for Interference with Medical Device)  Description: INTERVENTIONS:  1. Determine that other, less restrictive measures have been tried or would not be effective before applying the restraint  2. Evaluate the patient's condition at the time of restraint application  3. Inform patient/family regarding the reason for restraint  4. Q2H: Monitor safety, psychosocial status, comfort, nutrition and hydration  Outcome: Progressing   Determine that other, less restrictive measures have been tried or would not be effective before applying the restraint   Evaluate the patient's condition at the time of restraint application   Inform patient/family regarding the reason for restraint   Every 2 hours: Monitor safety, psychosocial status, comfort, nutrition and hydration     Problem: Pain  Goal: Verbalizes/displays adequate comfort level or baseline comfort level  Outcome: Progressing     Problem: Nutrition Deficit:  Goal: Optimize nutritional status  Outcome: Progressing     Problem: Skin/Tissue Integrity  Goal: Absence of new skin breakdown  Description: 1.  Monitor for areas of redness and/or skin breakdown  2.  Assess vascular access sites hourly  3.  Every 4-6 hours minimum:  Change oxygen saturation probe site  4.  Every 4-6 hours:  If on nasal continuous positive airway pressure, respiratory therapy assess nares and determine need for appliance change or resting period.  Outcome: Progressing     Lenny Turner RN

## 2022-12-25 NOTE — PROGRESS NOTES
VASCULAR     Seen for BLE cyanosis and ischemia. Remains on pressor support with increase on doses overnight. Feet cyanotic with minimal blanching to at least midfoot. Vasospasm with associated ischemia due to sepsis and pressor support. No plans for any form of vascular intervention at this time. Wean pressor agents as possible. High risk for some degree of tissue/limb loss if/when she recovers from sepsis.      Georgina Valdez

## 2022-12-26 PROBLEM — K56.7 ILEUS (HCC): Status: ACTIVE | Noted: 2022-01-01

## 2022-12-26 PROBLEM — R65.21 SEPTIC SHOCK (HCC): Status: ACTIVE | Noted: 2022-01-01

## 2022-12-26 PROBLEM — Z99.2 DIALYSIS PATIENT (HCC): Status: ACTIVE | Noted: 2022-01-01

## 2022-12-26 PROBLEM — A41.9 SEPTIC SHOCK (HCC): Status: ACTIVE | Noted: 2022-01-01

## 2022-12-26 NOTE — CONSULTS
[address] [place] [address] Nephrology   [facility]  [phone]  Nephrology Consult Note          Patient ID: Shyanne Lemos  Referring/ Physician: Ginette Pierre DO      HPI/Summary:   Shyanne Lemos is being seen by nephrology for Acute kidney injury (MAYTE). She is a 49 y.o. female with aPMH signficiant for spina bifida, neurogenic bladder, hydrocephalus s/p  shunt who presented to the hospital 12/22/2022 with increasing somnolence. She was unarousable by the time she was in the ED and had a high grade fever. She was in septic shock with high pressor needs. Since admission she has received a large volume of fluids for resuscitation and she has now become increasingly edematous. Cr has doubled from < 0.5 to 1.0 and remains on pressors. .       Plan:   Very appreciative of Dr. Bekah Caal for assistance with temp HD line placement. Will start CRRT for lactate clearance and volume management. Will stop bicarb infusion  Pressors for hemodynamic support  Mottling of the LE and UE due to hypoperfusion  Detailed discussed with pt's mother present at bedside about overall poor prognosis. If the infection improves she will have significant residual disabilities worse than current baseline. Assessment:  MAYTE:  - 2/2 septic shock and resulting ATN  - baseline Cr < 0.5, indicative for sarcopenia  - Cr at time of consult was 1.0  - starting CRRT 12/26/2022 for volume management    Volume overload:  - grossly volume overloaded due to massive volume of fluids given for resucitation  - continues to requrie bicarb for acidosis management  - UO borderline  - address with CRRT    Septic shock  - pneumonia with purulence secretions seen on bronch  - non-reversible ischemia of the LE due to pressors likely  - UTI with neurogenic bladder. [facility] would like to thank you for the opportunity to serve this patient. Please call with any questions or concerns.     Daniel Kate MD  [facility]  [address], [address]  Fax: [phone]  Office: [phone]         CC/Reason for consult:   Reason for consult: MAYTE, fluid overload. Chief Complaint   Patient presents with    Altered Mental Status           Review of Systems:   [address] All other remaining systems are negative. Constitutional:  fever, chills, weakness, weight change, fatigue,      Skin:  rash, pruritus, hair loss, bruising, dry skin, petechiae. Head, Face, Neck   headaches, swelling,  cervical adenopathy. Respiratory: shortness of breath, cough, or wheezing  Cardiovascular: chest pain, palpitations, dizzy, edema  Gastrointestinal: nausea, vomiting, diarrhea, constipation,belly pain    Yellow skin, blood in stool  Musculoskeletal:  back pain, muscle weakness, gait problems,       joint pain or swelling. Genitourinary:  dysuria, poor urine flow, flank pain, blood in urine  Neurologic:  vertigo, TIA'S, syncope, seizures, focal weakness  Psychosocial:  insomnia, anxiety, or depression. Additional positive findings: -     PMH/SH/FH:    Medical Hx: reviewed and updated as appropriate  Past Medical History:   Diagnosis Date    Hypertension     Mental retardation     functions ~ age 14    Paraplegia ([address]     S/P  shunt     behind right ear    Scoliosis     Hard time laying flat    Spina bifida ([address]     Wheelchair bound    Strabismus     Thyroid disease     hyperthyroidism    Toxic multinodular goiter     Urinary incontinence     self caths QID    UTI (urinary tract infection)     self caths every 4 hours         Surgical Hx: reviewed and updated as appropriate   has a past surgical history that includes [place] tooth extraction; Ventriculoperitoneal shunt; back surgery; Leg Surgery; Dental surgery (N/A, 2/5/2020); and Dental surgery (N/A, 9/12/2022).      Social Hx: reviewed and updated as appropriate  Social History     Tobacco Use    Smoking status: Never    Smokeless tobacco: Never   Substance Use Topics    Alcohol use: No        Family hx: reviewed and updated as appropriate  family history includes Diabetes in her father; High Blood Pressure in her father and mother. Medications:   docusate, 100 mg, BID  senna, 1 tablet, Daily  mupirocin, , BID  chlorhexidine, 15 mL, BID  carboxymethylcellulose PF, 1 drop, 6 times per day  meropenem, 1,000 mg, Q12H  collagenase, , Daily  methIMAzole, 5 mg, Daily  sodium chloride flush, 5-40 mL, 2 times per day  lidocaine 1 % injection, 5 mL, Once  pantoprazole, 40 mg, Daily       Patient has no known allergies. Allergies:   No Known Allergies      Physical Exam/Objective:   Vitals:    12/26/22 1730   BP: 102/73   Pulse: 78   Resp:    Temp:    SpO2:        Intake/Output Summary (Last 24 hours) at 12/26/2022 1807  Last data filed at 12/26/2022 1630  Gross per 24 hour   Intake 1898.8 ml   Output 1095 ml   Net 803.8 ml         General appearance: intubated. HEENT: no icterus, EOM intact, trachea midline. Neck : no masses, appears symmetrical and no JVD appreciated. Respiratory: ETT to vent bilateral equal chest rise. No wheeze, no crackles   Cardiovascular: Ausculation shows RRR and  4+ edema   Abdomen: abdomen is soft, non distended, no masses, no pain with palpation.   Musculoskeletal:  no joint swelling, no deformity, strength grossly normal.   Skin: no rashes, no induration, no tightening, no jaundice   Neuro:  AUDELIA      Data:   CBC:   Recent Labs     12/24/22  0530 12/25/22  0400 12/26/22  0455   WBC 31.3* 24.4* 22.6*   HGB 7.9* 7.2* 7.1*   HCT 25.5* 23.1* 22.3*   * 110* 90*     BMP:    Recent Labs     12/24/22  0530 12/24/22  1522 12/25/22  0400 12/26/22  0455   *  --  132* 131*   K 3.2* 3.8 3.3* 3.3*   CL 94*  --  89* 92*   CO2 24  --  23 26   BUN 27*  --  29* 33*   CREATININE 1.1  --  1.2* 1.1   GLUCOSE 132* 473* 121* 124*   MG 1.20* 2.50* 2.50* 2.40

## 2022-12-26 NOTE — PLAN OF CARE
Problem: Safety - Medical Restraint  Goal: Remains free of injury from restraints (Restraint for Interference with Medical Device)  Description: INTERVENTIONS:  1. Determine that other, less restrictive measures have been tried or would not be effective before applying the restraint  2. Evaluate the patient's condition at the time of restraint application  3. Inform patient/family regarding the reason for restraint  4.  Q2H: Monitor safety, psychosocial status, comfort, nutrition and hydration  12/26/2022 1046 by Jazmin Castañeda RN  Outcome: Progressing  12/25/2022 2333 by Elizabeth Aly RN  Outcome: Progressing

## 2022-12-26 NOTE — PROGRESS NOTES
12/26/22 0838   Patient Observation   Heart Rate (!) 0   Resp 16   SpO2 95 %   Vent Information   Vent Mode AC/PC   Ventilator Settings   Resp Rate (Set) 16 bmp   PEEP/CPAP (cmH2O) 5   FiO2  40 %   Pressure Support (cm H2O) 0 cm H2O   Insp Time (sec) 1.1 sec   Pressure Ordered 40   Vent Patient Data (Readings)   Vt (Measured) 364 mL   Peak Inspiratory Pressure (cmH2O) 45 cmH2O   Rate Measured 16 br/min   Minute Volume (L/min) 5.79 Liters   Mean Airway Pressure (cmH2O) 16 cmH20   I:E Ratio 1:2.40   Flow Sensitivity 3 L/min   I Time/ I Time % 1.1 s   Vent Alarm Settings   High Pressure (cmH2O) 55 cmH2O   Low Minute Volume (lpm) 2 L/min   Low Exhaled Vt (ml) 100 mL   RR High (bpm) 40 br/min   Apnea (secs) 20 secs   Additional Respiratoray Assessments   Humidification Source HME  (changed)   Airway Clearance   Suction Device Inline suction catheter   Sputum Method Obtained Endotracheal   Sputum Amount Small   Sputum Color/Odor Yellow   Sputum Consistency Thick   ETT    Placement Date: 12/22/22   Present on Admission/Arrival: No  Placed By: Licensed provider  Placement Verified By: Auscultation;Colorimetric ETCO2 device; Chest X-ray  Preoxygenation: Yes  Mask Ventilation: Ventilated by mask (1)  Airway Type: Cuffed  A...    Secured At 22 cm   Measured From Lips   ETT Placement Center   Secured By Commercial tube wayne   Site Assessment Dry

## 2022-12-26 NOTE — PROGRESS NOTES
RN replied to Dr. Chao Hires request to notifiy vascular MD by providing him with the phone number for the vascular group so that he may speak directly with the on call physician.

## 2022-12-26 NOTE — CONSULTS
[facility]   [facility]  [phone]           Consult received.  47 y.o. female with spina bifida, neurogenic bladder admitted with septic shock, respiratory failure, pneumonia, UTI.  On levo  Making urine but UO is low  In significant positive fluid balance  While creatinine is 1.0, her baseline is <0.5 so her creatinine has more than doubled.  Recommend starting CRRT for volume optimization and lactate clearance. CRRT orders placed.  Discussed with bedside RN  Full note to follow           [facility] would like to thank you for the opportunity to serve this patient. Please call with any questions or concerns.    MD [facility]  [address] [place] [address]  Fax: [phone]  Office: [phone]

## 2022-12-26 NOTE — PROCEDURES
Bronchoscopy note    ASA 4, intubated patient    Patient with acute respiratory failure with hypoxemia, sepsis with septic shock, pulm infiltrates with worsening chest congestion and spiking fever and given the patient's clinical status and the data available it was decided to do a therapeutic bronchoscopy and for that reason after informed consent, the RT was requested to come to the bedside, patient was already on IV sedation to maintain patient ventilator synchrony and did not require any additional sedation for the procedure, the bronchoscope was introduced through the endotracheal tube using a T-piece adapter, patient entire tracheobronchial tree was full of thick mucous plugs which were mucopurulent in nature and quite viscous and tenacious which were therapeutically aspirated out using Mucomyst and saline with improvement in the patency of the airways, the bronchial washings were sent for routine culture, patient tolerated procedure well and did not have any apparent complications  Estimated blood loss was 0    Further management depending on patient clinical status and the bronchoscopy results    Hernan Hamlin MD

## 2022-12-26 NOTE — PROGRESS NOTES
12/26/22 0324   Patient Observation   Heart Rate 65   Resp 16   SpO2 94 %   Breath Sounds   Right Upper Lobe End Expiratory Wheezes   Right Middle Lobe Diminished   Right Lower Lobe Crackles   Left Upper Lobe End Expiratory Wheezes   Left Lower Lobe Crackles   Vent Information   Equipment Changed HME   Vent Mode AC/PC   Ventilator Settings   Resp Rate (Set) 16 bmp   PEEP/CPAP (cmH2O) 5   FiO2  40 %   Pressure Support (cm H2O) 0 cm H2O   Vent Patient Data (Readings)   Vt (Measured) 354 mL   Peak Inspiratory Pressure (cmH2O) 46 cmH2O   Rate Measured 16 br/min   Minute Volume (L/min) 5.66 Liters   Mean Airway Pressure (cmH2O) 16 cmH20   I:E Ratio 1:2.40   I Time/ I Time % 1.1 s   Vent Alarm Settings   High Pressure (cmH2O) 55 cmH2O   Low Minute Volume (lpm) 2 L/min   Low Exhaled Vt (ml) 100 mL   RR High (bpm) 40 br/min   Additional Respiratoray Assessments   Humidification Source HME   Ambu Bag With Mask At Bedside Yes   Airway Clearance   Suction ET Tube   Suction Device Inline suction catheter   Sputum Method Obtained Endotracheal   Sputum Amount Small   Sputum Color/Odor Yellow   Sputum Consistency Thick   ETT    Placement Date: 12/22/22   Present on Admission/Arrival: No  Placed By: Licensed provider  Placement Verified By: Auscultation;Colorimetric ETCO2 device; Chest X-ray  Preoxygenation: Yes  Mask Ventilation: Ventilated by mask (1)  Airway Type: Cuffed  A...    Secured At 22 cm   Measured From Lips   ETT Placement Center   Secured By Commercial tube wayne   Site Assessment Dry   Cuff Pressure   (mov)

## 2022-12-26 NOTE — CARE COORDINATION
P #4. Remains on Vent and pressors. Condition guarded. Followed by IM, Nephrology, Pulmonary and Vascular Surgery.   Following      Rachael Solo RN

## 2022-12-26 NOTE — PROCEDURES
Geovanni Loera is a 49 y.o. female patient. 1. Septic shock ([facility] [address]    2. Acute cystitis with hematuria      Past Medical History:   Diagnosis Date    Hypertension     Mental retardation     functions ~ age 14    Paraplegia ([facility] [address]     S/P  shunt     behind right ear    Scoliosis     Hard time laying flat    Spina bifida ([facility] [address]     Wheelchair bound    Strabismus     Thyroid disease     hyperthyroidism    Toxic multinodular goiter     Urinary incontinence     self caths QID    UTI (urinary tract infection)     self caths every 4 hours     Blood pressure 102/73, pulse 78, temperature 98.1 °F (36.7 °C), temperature source Oral, resp. rate 16, height 4' (1.219 m), weight 106 lb 4.2 oz (48.2 kg), SpO2 96 %, not currently breastfeeding. Central Line    Date/Time: 12/26/2022 6:02 PM  Performed by: Anderson Bennett DO  Authorized by: Anderson Bennett DO   Consent: The procedure was performed in an emergent situation. Verbal consent obtained. Written consent obtained. Risks and benefits: risks, benefits and alternatives were discussed  Consent given by: parent and guardian  Patient understanding: patient states understanding of the procedure being performed  Patient consent: the patient's understanding of the procedure matches consent given  Procedure consent: procedure consent matches procedure scheduled  Relevant documents: relevant documents present and verified  Patient identity confirmed: verbally with patient, arm band, provided demographic data and hospital-assigned identification number  Time out: Immediately prior to procedure a \"time out\" was called to verify the correct patient, procedure, equipment, support staff and site/side marked as required. Indications: Dialysis. Anesthesia: local infiltration and see MAR for details    Anesthesia:  Local Anesthetic: lidocaine 1% without epinephrine  Anesthetic total: 5 mL    Sedation:  Patient sedated: yes  Sedation: pecedex.   Analgesia: fentanyl  Sedation start date/time: 12/26/2022 5:50 PM  Sedation end date/time: 12/26/2022 6:04 PM  Vitals: Vital signs were monitored during sedation.     Preparation: skin prepped with 2% chlorhexidine  Skin prep agent dried: skin prep agent completely dried prior to procedure  Sterile barriers: all five maximum sterile barriers used - cap, mask, sterile gown, sterile gloves, and large sterile sheet  Location details: left internal jugular  Site selection rationale: right internal jugular atretic and terminates at base of the neck  Patient position: Trendelenburg  Catheter type: triple lumen  Catheter size: 12 Fr  Ultrasound guidance: yes  Sterile ultrasound techniques: sterile gel and sterile probe covers were used  Number of attempts: 1  Successful placement: yes  Post-procedure: line sutured and dressing applied  Assessment: blood return through all ports, free fluid flow, placement verified by x-ray and no pneumothorax on x-ray  Patient tolerance: patient tolerated the procedure well with no immediate complications  Comments: EBL: less than [address], DO  12/26/2022

## 2022-12-26 NOTE — PROGRESS NOTES
12/25/22 2320   Patient Observation   Heart Rate 70   Resp 16   SpO2 93 %   Breath Sounds   Right Upper Lobe Diminished   Right Middle Lobe Crackles   Right Lower Lobe Crackles   Left Upper Lobe Diminished   Left Lower Lobe Diminished   Vent Information   Vent Mode AC/PC   Ventilator Settings   Resp Rate (Set) 16 bmp   PEEP/CPAP (cmH2O) 5   FiO2  40 %   Pressure Support (cm H2O) 0 cm H2O   Vent Patient Data (Readings)   Vt (Measured) 338 mL   Peak Inspiratory Pressure (cmH2O) 45 cmH2O   Rate Measured 16 br/min   Minute Volume (L/min) 5.41 Liters   Mean Airway Pressure (cmH2O) 16 cmH20   I:E Ratio 1:2.40   I Time/ I Time % 1.1 s   Vent Alarm Settings   High Pressure (cmH2O) 55 cmH2O   Low Minute Volume (lpm) 2 L/min   Low Exhaled Vt (ml) 100 mL   RR High (bpm) 40 br/min   Airway Clearance   Suction ET Tube   Suction Device Inline suction catheter   Sputum Method Obtained Endotracheal   Sputum Amount Small   Sputum Color/Odor Tan   Sputum Consistency Thick   ETT    Placement Date: 12/22/22   Present on Admission/Arrival: No  Placed By: Licensed provider  Placement Verified By: Auscultation;Colorimetric ETCO2 device; Chest X-ray  Preoxygenation: Yes  Mask Ventilation: Ventilated by mask (1)  Airway Type: Cuffed  A...    Secured At 22 cm   Measured From Lips   ETT Placement Right   Secured By Commercial tube wayne   Site Assessment Dry   Cuff Pressure   (MOV)

## 2022-12-26 NOTE — PROGRESS NOTES
12/25/22 1919   Patient Observation   Heart Rate 96   Resp 16   SpO2 92 %   Breath Sounds   Right Upper Lobe Clear   Right Middle Lobe Diminished   Right Lower Lobe Diminished   Left Upper Lobe Clear   Left Lower Lobe Diminished   Vent Information   Vent Mode AC/PC   Ventilator Settings   Resp Rate (Set) 16 bmp   PEEP/CPAP (cmH2O) 5   FiO2  40 %   Pressure Support (cm H2O) 0 cm H2O   Insp Time (sec) 1.1 sec   Pressure Ordered 40   Vent Patient Data (Readings)   Vt (Measured) 324 mL   Peak Inspiratory Pressure (cmH2O) 45 cmH2O   Rate Measured 16 br/min   Minute Volume (L/min) 5.12 Liters   Mean Airway Pressure (cmH2O) 16 cmH20   I:E Ratio 1:2.40   I Time/ I Time % 1.1 s   Vent Alarm Settings   High Pressure (cmH2O) 55 cmH2O   Low Minute Volume (lpm) 2 L/min   Low Exhaled Vt (ml) 100 mL   RR High (bpm) 40 br/min   Additional Respiratoray Assessments   Humidification Source HME   Ambu Bag With Mask At Bedside Yes   Airway Clearance   Suction ET Tube   Suction Device Inline suction catheter   Sputum Method Obtained Endotracheal   Sputum Amount Small   Sputum Color/Odor Tan   Sputum Consistency Thick   ETT    Placement Date: 12/22/22   Present on Admission/Arrival: No  Placed By: Licensed provider  Placement Verified By: Auscultation;Colorimetric ETCO2 device; Chest X-ray  Preoxygenation: Yes  Mask Ventilation: Ventilated by mask (1)  Airway Type: Cuffed  A...    Secured At 22 cm   Measured From Lips   ETT Placement Center   Secured By Commercial tube wayne   Site Assessment Dry   Cuff Pressure 28 cm H2O

## 2022-12-26 NOTE — PLAN OF CARE
Problem: Safety - Medical Restraint  Goal: Remains free of injury from restraints (Restraint for Interference with Medical Device)  Description: INTERVENTIONS:  1. Determine that other, less restrictive measures have been tried or would not be effective before applying the restraint  2. Evaluate the patient's condition at the time of restraint application  3. Inform patient/family regarding the reason for restraint  4.  Q2H: Monitor safety, psychosocial status, comfort, nutrition and hydration  12/25/2022 2333 by Kiya Stout RN  Outcome: Progressing  12/25/2022 1140 by Luis Bender RN  Outcome: Progressing  Flowsheets  Taken 12/25/2022 0600 by Natasha Verduzco RN  Remains free of injury from restraints (restraint for interference with medical device):   Determine that other, less restrictive measures have been tried or would not be effective before applying the restraint   Evaluate the patient's condition at the time of restraint application   Inform patient/family regarding the reason for restraint   Every 2 hours: Monitor safety, psychosocial status, comfort, nutrition and hydration  Taken 12/25/2022 0400 by Naatsha Verduzco RN  Remains free of injury from restraints (restraint for interference with medical device):   Determine that other, less restrictive measures have been tried or would not be effective before applying the restraint   Evaluate the patient's condition at the time of restraint application   Inform patient/family regarding the reason for restraint   Every 2 hours: Monitor safety, psychosocial status, comfort, nutrition and hydration  Taken 12/25/2022 0200 by Natasha Verduzco RN  Remains free of injury from restraints (restraint for interference with medical device):   Determine that other, less restrictive measures have been tried or would not be effective before applying the restraint   Evaluate the patient's condition at the time of restraint application   Inform patient/family regarding the reason for restraint   Every 2 hours: Monitor safety, psychosocial status, comfort, nutrition and hydration  Taken 12/25/2022 0000 by Yvonne Reddy RN  Remains free of injury from restraints (restraint for interference with medical device):   Determine that other, less restrictive measures have been tried or would not be effective before applying the restraint   Evaluate the patient's condition at the time of restraint application   Inform patient/family regarding the reason for restraint   Every 2 hours: Monitor safety, psychosocial status, comfort, nutrition and hydration  Taken 12/24/2022 2200 by Yvonne Reddy RN  Remains free of injury from restraints (restraint for interference with medical device):   Determine that other, less restrictive measures have been tried or would not be effective before applying the restraint   Evaluate the patient's condition at the time of restraint application   Inform patient/family regarding the reason for restraint   Every 2 hours: Monitor safety, psychosocial status, comfort, nutrition and hydration     Problem: Pain  Goal: Verbalizes/displays adequate comfort level or baseline comfort level  12/25/2022 2333 by Vibha Perez RN  Outcome: Progressing  Flowsheets (Taken 12/25/2022 2000)  Verbalizes/displays adequate comfort level or baseline comfort level: Assess pain using appropriate pain scale  12/25/2022 1140 by Domi Montgomery RN  Outcome: Progressing     Problem: Nutrition Deficit:  Goal: Optimize nutritional status  12/25/2022 2333 by Vibha Perez RN  Outcome: Progressing  12/25/2022 1140 by Domi Montgomery RN  Outcome: Progressing

## 2022-12-26 NOTE — PROGRESS NOTES
INPATIENT PULMONARY CRITICAL CARE PROGRESS NOTE      Reason for visit    severe sepsis    SUBJECTIVE: Patient when seen this morning continues to be critically ill on mechanical ventilatory support, patient was on 40% oxygen PEEP of 5 to maintain oxygen saturation, patient has been failing CPAP trials, patient has had a T-max of 101.2 °F but was afebrile this morning, patient has been on IV pressors to maintain hemodynamics, patient continues to be on IV sedation to maintain patient ventilator synchrony  patient has increased swelling of the extremities and also abdominal distention is present, patient's NG tube has been put on hold for possible ileus as per nursing, patient had acceptable glycemic control, patient urine output was borderline, patient has a cumulative fluid balance of +13.5 L,no other pertinent ROS of concern which could be elucidated      Physical Exam:  Blood pressure 111/72, pulse 73, temperature (!) 96.4 °F (35.8 °C), temperature source Bladder, resp. rate 16, height 4' (1.219 m), weight 106 lb 4.2 oz (48.2 kg), SpO2 95 %, not currently breastfeeding.'     Constitutional: No profound respiratory distress on mechanical ventilator support  HENT: Endotracheal tube present. No thyromegaly. Eyes:  Conjunctivae are normal. Pupils equal, round, and reactive to light. No scleral icterus. Neck: . No tracheal deviation present. No obvious thyroid mass. Cardiovascular: Sinus rhythm. No lower extremity edema. Pulmonary/Chest: No wheezes. Bilateral rales. Chest wall is not dull to percussion. No use of accessory muscle respiration  Abdominal: Profoundly distended a abdomen when seen  Musculoskeletal: No cyanosis. No clubbing. Spina bifida with scoliosis Patient has atrophic lower extremities  Lymphadenopathy: No cervical or supraclavicular adenopathy.    Skin: Mottling of the skin especially in the distal aspect of lower extremities which is worsening along with that patient also has slight duskiness of the left fingers, sacral decub  Neurologic intubated and sedated      Results:  CBC:   Recent Labs     12/24/22  0530 12/25/22  0400 12/26/22  0455   WBC 31.3* 24.4* 22.6*   HGB 7.9* 7.2* 7.1*   HCT 25.5* 23.1* 22.3*   MCV 77.0* 76.6* 76.4*   * 110* 90*     BMP:   Recent Labs     12/24/22  0530 12/24/22  1522 12/25/22  0400 12/26/22  0455   *  --  132* 131*   K 3.2* 3.8 3.3* 3.3*   CL 94*  --  89* 92*   CO2 24  --  23 26   BUN 27*  --  29* 33*   CREATININE 1.1  --  1.2* 1.1     LIVER PROFILE:   Recent Labs     12/24/22  0530 12/25/22  0400 12/26/22  0455   AST 4,285* 1,125* 349*   ALT 1,817* 1,167* 560*   BILIDIR <0.2 0.4* <0.2   BILITOT 0.4 0.6 0.4   ALKPHOS 105 330* 214*             Imaging:  I have reviewed radiology images personally. XR ABDOMEN (KUB) (SINGLE AP VIEW)   Final Result   Limited study. Distended loops of bowel are noted. This has worsened. Findings could be related to ileus versus bowel obstruction. XR CHEST PORTABLE   Final Result   1. Right base opacity with small pleural effusion. If patient has clinical   symptoms of infection, differential would include pneumonia. In the absence   of infectious symptoms, atelectasis can have the same appearance. .   2. ETT tip at the level of the susanne. CT ABDOMEN PELVIS WO CONTRAST Additional Contrast? None   Final Result   1. Technically limited CT due to the patient's underlying medical condition   and artifact from positioning, hardware and lack of IV contrast.   2. Probable sacral decubitus ulcer for which correlation with physical exam   is advised. 3. Questionable inflammation at the left hip joint which may simply be due to   the patient's underlying medical condition and stasis. Osteoarthritis or   septic pattern of arthritis cannot be definitively excluded. 4. Heterogeneity of the right kidney that is difficult to evaluate.   An   underlying pattern of inflammation/pyelonephritis may be considered in the appropriate clinical setting. No obvious sign of obstructive uropathy. 5. Bibasilar pleural fluid and airspace changes. Correlate with pulmonary   symptoms. XR ABDOMEN (KUB) (SINGLE AP VIEW)   Final Result   Enteric tube is in the stomach         XR CHEST PORTABLE   Final Result   Endotracheal tube is at the susanne         XR CHEST PORTABLE   Final Result   No radiographic evidence of acute pulmonary disease. Blood Culture, Routine  Abnormal   Gram stain Aerobic bottle:   Gram negative rods   Information to follow   Gram stain Anaerobic bottle:   Gram negative rods   Information to follow   P      Organism Escherichia coli DNA Detected Abnormal  P    Blood Culture, Routine See additional report for complete BCID panel.      Organism Gram negative geovanny Abnormal     Urine Culture, Routine <10,000 CFU/ml   No further workup    Resulting Agency [facility]           Narrative  Performed by: [facility]  ORDER#: [id number]                          ORDERED BY: Omi Jane   SOURCE: Urine Clean Catch                  COLLECTED:  12/23/22 08:50   ANTIBIOTICS AT MICHAEL.:                      RECEIVED :  12/23/22 09:40            Latest Reference Range & Units 12/22/22 23:07 12/23/22 08:28 12/24/22 06:55 12/25/22 04:26   O2 Therapy  Unknown Unknown Unknown Unknown   Hemoglobin, Art, Extended 12.0 - 16.0 g/dL 9.0 (L) 9.8 (L) 9.1 (L) 9.1 (L)   pH, Arterial 7.350 - 7.450  7.417 7.388 7.524 (H) 7.476 (H)   pCO2, Arterial 35.0 - 45.0 mmHg 21.4 (L) 22.9 (L) 31.0 (L) 33.8 (L)   pO2, Arterial 75.0 - 108.0 mmHg 161.7 (H) 144.7 (H) 133.5 (H) 90.4   HCO3, Arterial 21.0 - 29.0 mmol/L 13.5 (L) 13.5 (L) 25.0 24.4   TCO2 (calc), Art Not Established mmol/L 14.1 14.2 25.9 25.4   Base Excess, Arterial -3.0 - 3.0 mmol/L -9.6 (L) -10.0 (L) 2.4 1.0   O2 Sat, Arterial >92 % 98.3 98.5 98.3 96.5   Methemoglobin, Arterial <1.5 % 0.5 0.7 0.7 0.5   Carboxyhgb, Arterial 0.0 - 1.5 % 0.2 0.3 0.3 0.3 Principal Problem:    Severe sepsis ([facility] [address]  Active Problems:    Other secondary scoliosis, thoracolumbar region    Lactic acidosis    Pyuria    Elevated alkaline phosphatase level    Metabolic acidemia    Elevated procalcitonin    Bandemia    SVT (supraventricular tachycardia) (HCC)    Hyperthyroidism    Bacteremia due to Escherichia coli    Hypoglycemia    Shock liver    Pulmonary infiltrates    Thrombocytopenia (HCC)    Ileus (HCC)    Spina bifida ([facility] [address]  Resolved Problems:    * No resolved hospital problems.  *          Plan:   Ventilator  support to keep saturation between 90 and 94% only  Ventilator settings and waveforms reviewed  Pulmonary toilet  Repeat bronchoscopy and the results are pending  IV sedation to maintain patient ventilator synchrony  Titration of sedation as per RASS scores  Continue IV meropenem for now and titration as per clinical status and cultures  Patient has been given fluid boluses  Patient continues to be hypotensive and patient was started on IV pressors and titration of pressors as per mean arterial pressure more than 65 mmHg  Patient continues to have increased mottling of the extremities secondary to hypoperfusion  Patient may lose distal limbs-time will tell-vascular surgery on board  Patient's IV fluid has been discontinued  Nephrology consult has been requested by IM  Patient has been profound thrombocytopenia  Patient's subcu heparin was discontinued and HIT panel was ordered on a stat basis and the results are still pending  Trend the lactic acid levels-moving the right direction  Monitor for any worsening renal insufficiency  Patient is getting Tapazole at this time-is to be continued-patient's TSH/T3 and T4 were within normal range  CT of the abdomen pelvis was reviewed  Strict input output charting  Patient's renal function is to monitor closely as patient has potential for further decompensation and MAYTE secondary to ATN  Patient's WBC count and lactic acid to be trended  Patient's urine culture shows patient to have gram-negative rods  Patient has a shock liver and patient's LFTs to be trended and patient's LFTs trending in the lactation as compared to yesterday  Wound care as per wound care team  NG tube to intermittent suction  PUD and DVT prophylaxis    Patient's clinical status is precarious and critical and has poor potential for further decompensation with potential for non survival    Case was discussed with  ICU team    Critical care time spent with patient was 35 minutes exclusive of any procedures    Patient's clinical status deteriorates further - consider palliative care consult to discuss the goals of care and CODE STATUS        Electronically signed by:  Kim Toussaint MD    12/26/2022    1:18 PM.

## 2022-12-26 NOTE — PROGRESS NOTES
Perfect Serve message sent to Dr. Lalo Tamez notifying him of Dr. Medellin Been inability to attempt a VasCath placement today. Dr. Lalo Tamez requested vascular to place since he wouldn't be rounding until after 5 pm. RN informed MD that vascular typically does not place lines for us so a consult was placed to IR.

## 2022-12-26 NOTE — PROGRESS NOTES
Shift: [id number]    Admitting diagnosis: Sepsis    Presentation to hospital: Mother called EMS when she found pt with AMS     Surgery: no     Nursing assessment at handoff  stable    Emergency Contact/POA:Mom Bianka Carranza  Family updated: yes - Mom & sisters visited    Most recent vitals: BP (!) 83/47   Pulse (!) 120   Temp 98.1 °F (36.7 °C) (Oral)   Resp 16   Ht 4' (1.219 m)   Wt 106 lb 4.2 oz (48.2 kg)   SpO2 98%   BMI 32.43 kg/m²      Rhythm: Normal Sinus Rhythm 70-90's (Poor waveform despite changing electrodes, cable & leads)    NC/HFNC- NA   Respiratory support: - Ventilator Settings:    Vt (Set, mL): 400 mL  Resp Rate (Set): 16 bmp  FiO2 : 70 %    PEEP/CPAP (cmH2O): 5       Vent days: Day 4    Increased O2 requirements: yes - 40% prior to VasCath but required increase to 70% afterwards    Admission weight Weight: 59 lb (26.8 kg)  Today's weight   Wt Readings from Last 1 Encounters:   12/26/22 106 lb 4.2 oz (48.2 kg)         UOP >30ml/hr: yes      Byrnes need assessed each shift: yes, pt has 4+ labial edema    Restraints: yes,   Order current and documentation up to date?  Yes    Lines/Drains  LDA Insertion Date Discontinued Date Dressing Changes   PIV  x2     TLC  12/22 rt fem     Arterial       Byrnes  12/22     Vas Cath 12/26 LJ     ETT  12/22 #7.5     Surgical drains        Night Shift Hospitalist Interventions    Problem(Brief) Date Time Intervention Physician contacted                                               Drip rates at handoff:    Maximiano Bumps 4/2.5      Maximiano Bumps 4/2.5      prismaSATE BGK 4/2.5      vasopressin (Septic Shock) infusion Stopped (12/25/22 0903)    fentaNYL 75 mcg/hr (12/26/22 1322)    dextrose      sodium chloride      dexmedetomidine 0.8 mcg/kg/hr (12/26/22 1252)    sodium chloride      norepinephrine 4 mcg/min (12/26/22 1831)       Hospital Course Daily Updates:  Admit Day# 4 12/26: Siria Jennings .................................................................................. Siria Jennings Lab Data:   CBC:   Recent Labs     12/25/22  0400 12/26/22 0455   WBC 24.4* 22.6*   HGB 7.2* 7.1*   HCT 23.1* 22.3*   MCV 76.6* 76.4*   * 90*     BMP:    Recent Labs     12/25/22  0400 12/26/22 0455   * 131*   K 3.3* 3.3*   CO2 23 26   BUN 29* 33*   CREATININE 1.2* 1.1     LIVR:   Recent Labs     12/25/22  0400 12/26/22 0455   AST 1,125* 349*   ALT 1,167* 560*     PT/INR:   Recent Labs     12/25/22  0400 12/26/22 0455   PROT 4.2* 4.0*     APTT: No results for input(s): APTT in the last 72 hours.   ABG:   Recent Labs     12/24/22  0655 12/25/22 0426   PHART 7.524* 7.476*   ADL1MCL 31.0* 33.8*   PO2ART 133.5* 90.4     Consults (if GI or Nephrology- which group?)-  [facility] nephrology

## 2022-12-26 NOTE — PROGRESS NOTES
12/26/22 1139   Patient Observation   Heart Rate 81   SpO2 94 %   Vent Information   Vent Mode AC/PC   Ventilator Settings   Resp Rate (Set) 16 bmp   PEEP/CPAP (cmH2O) 5   FiO2  40 %   Pressure Support (cm H2O) 0 cm H2O   Insp Time (sec) 1.1 sec   Pressure Ordered 40   Vent Patient Data (Readings)   Vt (Measured) 442 mL   Peak Inspiratory Pressure (cmH2O) 46 cmH2O   Rate Measured 17 br/min   Minute Volume (L/min) 6.28 Liters   Mean Airway Pressure (cmH2O) 16 cmH20   I:E Ratio 1:2.40   Flow Sensitivity 3 L/min   I Time/ I Time % 1.1 s   Vent Alarm Settings   High Pressure (cmH2O) 55 cmH2O   Low Minute Volume (lpm) 2 L/min   Low Exhaled Vt (ml) 100 mL   RR High (bpm) 40 br/min   Apnea (secs) 20 secs   Additional Respiratoray Assessments   Humidification Source HME   Airway Clearance   Suction Device Inline suction catheter   Sputum Method Obtained Endotracheal   Sputum Amount Small   Sputum Color/Odor Yellow   Sputum Consistency Thick   ETT    Placement Date: 12/22/22   Present on Admission/Arrival: No  Placed By: Licensed provider  Placement Verified By: Auscultation;Colorimetric ETCO2 device; Chest X-ray  Preoxygenation: Yes  Mask Ventilation: Ventilated by mask (1)  Airway Type: Cuffed  A...    Secured At 22 cm   Measured From Lips   ETT Placement Right   Secured By Commercial tube wayne   Site Assessment Dry   Cuff Pressure 30 cm H2O

## 2022-12-26 NOTE — PROGRESS NOTES
VASCULAR    Seen for BLE ischemia. Remains intubated. Intermittently hypotensive - weaned slightly on Levophed. Very swollen  BLE with demarcated nonblanching cyanosis to at least mid-calfs. A/P: Irreversible BLE ischemia. Nonsalvageable. If pt survives will require B AKAs. No plans at this time.      Jessie Kern

## 2022-12-26 NOTE — PROGRESS NOTES
12/26/22 1419   Encounter Summary   Encounter Overview/Reason  Spiritual/Emotional Needs   Service Provided For: Patient and family together   Referral/Consult From: Other    Support System Family members  (Patient's boss has been supportive per family.)   Last Encounter  12/26/22  (Offered support. Family states prayer cab agitate patient.)   Complexity of Encounter Low   Begin Time 1400   End Time  1421   Total Time Calculated 21 min   Encounter    Type Family Care   Spiritual/Emotional needs   Type Spiritual Support   Assessment/Intervention/Outcome   Assessment Calm   Intervention Active listening;Explored/Affirmed feelings, thoughts, concerns   Outcome Receptive   Plan and Referrals   Plan/Referrals Continue Support (comment)    referral for follow-up family support. Family states patient is more awake today. When  asked if prayer was something patient would find comforting, sister stated prayer can be agitating to patient. In light of a desire for calming presence,  did not offer prayer today. Mother openly shared the support patient's employment provided. Continue prn support.

## 2022-12-26 NOTE — PROGRESS NOTES
12/26/22 1516   Patient Observation   Heart Rate 80   Resp 16   SpO2 96 %   Breath Sounds   Right Upper Lobe Clear   Right Middle Lobe Diminished   Right Lower Lobe Clear   Left Upper Lobe Diminished   Left Lower Lobe Diminished   Vent Information   Vent Mode AC/PC   Ventilator Settings   Resp Rate (Set) 16 bmp   PEEP/CPAP (cmH2O) 5   FiO2  40 %   Pressure Support (cm H2O) 0 cm H2O   Insp Time (sec) 1.1 sec   Vent Patient Data (Readings)   Vt (Measured) 548 mL   Peak Inspiratory Pressure (cmH2O) 46 cmH2O   Rate Measured 16 br/min   Minute Volume (L/min) 8.74 Liters   Mean Airway Pressure (cmH2O) 16 cmH20   I:E Ratio 1:2.40   I Time/ I Time % 1.1 s   Backup Apnea On   Vent Alarm Settings   High Pressure (cmH2O) 55 cmH2O   Low Minute Volume (lpm) 2 L/min   Low Exhaled Vt (ml) 100 mL   RR High (bpm) 40 br/min   Apnea (secs) 20 secs   Additional Respiratoray Assessments   Humidification Source HME   Ambu Bag With Mask At Bedside Yes   Airway Clearance   Suction ET Tube   Suction Device Inline suction catheter   Sputum Method Obtained Endotracheal   Sputum Amount Large   Sputum Color/Odor Tan   Sputum Consistency Thick   ETT    Placement Date: 12/22/22   Present on Admission/Arrival: No  Placed By: Licensed provider  Placement Verified By: Auscultation;Colorimetric ETCO2 device; Chest X-ray  Preoxygenation: Yes  Mask Ventilation: Ventilated by mask (1)  Airway Type: Cuffed  A...    Secured At 22 cm   Measured From Lips   ETT Placement Center   Secured By Commercial tube wayne   Site Assessment Dry   Cuff Pressure 30 cm H2O

## 2022-12-26 NOTE — PROGRESS NOTES
Hospitalist Progress Note      PCP: Rosangela Moore DO    Date of Admission: 12/22/2022    Chief Complaint: [address] Course:   49 y.o. female who presented to [facility] with AMS. Patient has a history of spinal bifida and mental retardation. She is wheel chair bound and her mother is her caregiver and decision maker. Per patient's mother, patient started seeming fatigued a few hours ago. This quickly progressed and patient started to seem somnolent. By the time she presented to the ED, patient was unarousable. She had a high fever and severe tachycardia. She regained consciousness once IVF were started. Patient has a neurogenic bladder and straight caths multiple times per day. She had evidence of UTI on a UA. Patient had good response to IVF but remains critically ill in ED.     Subjective: intubated, sedated       Medications:  Reviewed    Infusion Medications    vasopressin (Septic Shock) infusion Stopped (12/25/22 0903)    electrolyte-A 50 mL/hr at 12/25/22 1800    fentaNYL 150 mcg/hr (12/26/22 0429)    dextrose      sodium chloride      dexmedetomidine 0.8 mcg/kg/hr (12/25/22 5150)    sodium chloride      norepinephrine 3 mcg/min (12/26/22 7814)     Scheduled Medications    senna  1 tablet Oral Daily    mupirocin   Nasal BID    chlorhexidine  15 mL Mouth/Throat BID    carboxymethylcellulose PF  1 drop Both Eyes 6 times per day    meropenem  1,000 mg IntraVENous Q12H    collagenase   Topical Daily    methIMAzole  5 mg Oral Daily    sodium chloride flush  5-40 mL IntraVENous 2 times per day    lidocaine 1 % injection  5 mL IntraDERmal Once    pantoprazole  40 mg IntraVENous Daily     PRN Meds: magnesium sulfate, potassium chloride, glucose, dextrose bolus **OR** dextrose bolus, glucagon (rDNA), dextrose, sodium chloride flush, sodium chloride, acetaminophen **OR** acetaminophen, sodium chloride      Intake/Output Summary (Last 24 hours) at 12/26/2022 4263  Last data filed at 12/26/2022 0645  Gross per 24 hour   Intake 5328.77 ml   Output 550 ml   Net 4778.77 ml         Physical Exam Performed:    BP (!) 78/48   Pulse 79   Temp 97.1 °F (36.2 °C) (Bladder)   Resp 29   Ht 4' (1.219 m)   Wt 106 lb 4.2 oz (48.2 kg)   SpO2 93%   BMI 32.43 kg/m²     General appearance:  intubated, sedated  HEENT:  Normal cephalic, atraumatic without obvious deformity. Pupils equal, round, and reactive to light. Extra ocular muscles intact. Conjunctivae/corneas clear. Neck: Supple, with full range of motion. No jugular venous distention. Trachea midline. Respiratory:  Normal respiratory effort. Clear to auscultation, bilaterally without Rales/Wheezes/Rhonchi. Cardiovascular:  RRR with normal S1/S2 without murmurs, rubs or gallops. Abdomen: Soft, non-tender, non-distended with normal bowel sounds. Musculoskeletal:  No clubbing, cyanosis or edema bilaterally. Full range of motion without deformity. Chronic LE contractures, poor tone. Feet cyanotic with diffuse mottling  Neurologic: Intubated and sedataed        Labs:   Recent Labs     12/24/22  0530 12/25/22  0400 12/26/22  0455   WBC 31.3* 24.4* 22.6*   HGB 7.9* 7.2* 7.1*   HCT 25.5* 23.1* 22.3*   * 110* 90*       Recent Labs     12/24/22  0530 12/24/22  1522 12/25/22  0400 12/26/22  0455   *  --  132* 131*   K 3.2* 3.8 3.3* 3.3*   CL 94*  --  89* 92*   CO2 24  --  23 26   BUN 27*  --  29* 33*   CREATININE 1.1  --  1.2* 1.1   CALCIUM 5.6*  --  6.1* 6.6*       Recent Labs     12/24/22  0530 12/25/22  0400 12/26/22  0455   AST 4,285* 1,125* 349*   ALT 1,817* 1,167* 560*   BILIDIR <0.2 0.4* <0.2   BILITOT 0.4 0.6 0.4   ALKPHOS 105 330* 214*       No results for input(s): INR in the last 72 hours. No results for input(s): Jaclyn Highman in the last 72 hours.     Urinalysis:      Lab Results   Component Value Date/Time    NITRU Negative 12/22/2022 03:05 PM    WBCUA >100 12/22/2022 03:05 PM    BACTERIA 4+ 12/22/2022 03:05 PM    RBCUA None seen 12/22/2022 03:05 PM    BLOODU LARGE 12/22/2022 03:05 PM    SPECGRAV >=1.030 12/22/2022 03:05 PM    GLUCOSEU Negative 12/22/2022 03:05 PM       Radiology:  XR ABDOMEN (KUB) (SINGLE AP VIEW)   Final Result   Limited study. Distended loops of bowel are noted. This has worsened. Findings could be related to ileus versus bowel obstruction. XR CHEST PORTABLE   Final Result   1. Right base opacity with small pleural effusion. If patient has clinical   symptoms of infection, differential would include pneumonia. In the absence   of infectious symptoms, atelectasis can have the same appearance. .   2. ETT tip at the level of the susanne. CT ABDOMEN PELVIS WO CONTRAST Additional Contrast? None   Final Result   1. Technically limited CT due to the patient's underlying medical condition   and artifact from positioning, hardware and lack of IV contrast.   2. Probable sacral decubitus ulcer for which correlation with physical exam   is advised. 3. Questionable inflammation at the left hip joint which may simply be due to   the patient's underlying medical condition and stasis. Osteoarthritis or   septic pattern of arthritis cannot be definitively excluded. 4. Heterogeneity of the right kidney that is difficult to evaluate. An   underlying pattern of inflammation/pyelonephritis may be considered in the   appropriate clinical setting. No obvious sign of obstructive uropathy. 5. Bibasilar pleural fluid and airspace changes. Correlate with pulmonary   symptoms. XR ABDOMEN (KUB) (SINGLE AP VIEW)   Final Result   Enteric tube is in the stomach         XR CHEST PORTABLE   Final Result   Endotracheal tube is at the susanne         XR CHEST PORTABLE   Final Result   No radiographic evidence of acute pulmonary disease.              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  Lactate 10.2 on admission  - continue IVF  - blood, urine cultures positive of E coli  - suspect pyelonephritis based on CT abd/pel and culture results  - wean pressors as able  - continue merrem      Acute hypoxic respiratory failure  - 2/2 above  - pulmonology consulted  - intubated 12/22  - vent management per pulm  Bronch 12/26 - thick mucous plugs      Acute metabolic encephalopathy  - 2/2 above  - supportive care     Lactic acidosis  - severe, 2/2 sepsis  - continue bicarb gtt  - vascular surgery consulted for severe mottling  - no surgical intervention warranted  - continue to monitor    MAYTE  - 2/2 shock  - nephrology consulted  - Cr trending up consistently but slowly likely related to extremely small mass  - continues with oliguria  - continue IVF  - continue to monitor    Limb ischemia  - due to shock and pressor use  - vascular surgery consulted  - high risk for loss of viability  - no plan for surgical intervention Hyperthyroidism  - TSH WNL  - continue home methimazole     Anemia  Thrombocytopenia  - Heparin was DC  HIT ordered   - chronic, stable  - holding home Fe supplement  - continue to monitor     HTN  - hypotension on admission  - holding metoprolol     Spina Bifida  - paraplegic at baseline  - supportive care    DVT Prophylaxis: SCDs due to thrombocytopenia, HIT panel pending   Diet: ADULT TUBE FEEDING; Orogastric; Renal Formula; Continuous; 20; Yes; 0; Other (specify); trophic; 20; 30; Q 4 hours  Code Status: Full Code  PT/OT Eval Status: ordered    Dispo - ICU    Appropriate for A1 Discharge Unit: No      Becca Douglass DO

## 2022-12-26 NOTE — PROGRESS NOTES
Dr. Hina Barr (vascular) called RN after having received a call from Dr. Leta Sims. Update provided. He will contact his partner as well as Dr. Leta Sims and call RN back.

## 2022-12-27 NOTE — CARE COORDINATION
Chart reviewed. Pt remains intubated and sedated. On CRRT, levophed gtt. TF's on hold. Family changed code status to DNR -CCA after discussion with MD about Pt prognosis/condition. Will follow for dcp needs pending clinical course.

## 2022-12-27 NOTE — PROGRESS NOTES
12/26/22 1956   Patient Observation   Heart Rate 94   Resp 16   SpO2 97 %   Breath Sounds   Right Upper Lobe Diminished   Right Middle Lobe Diminished   Right Lower Lobe Diminished   Left Upper Lobe Diminished   Left Lower Lobe Diminished   Vent Information   Vent Mode AC/PC   Ventilator Settings   Resp Rate (Set) 16 bmp   PEEP/CPAP (cmH2O) 5   FiO2  70 %   Pressure Support (cm H2O) 0 cm H2O   Insp Time (sec) 1.1 sec   Vent Patient Data (Readings)   Vt (Measured) 595 mL   Peak Inspiratory Pressure (cmH2O) 46 cmH2O   Rate Measured 16 br/min   Minute Volume (L/min) 9.58 Liters   Mean Airway Pressure (cmH2O) 17 cmH20   I:E Ratio 1:2.40   I Time/ I Time % 1.1 s   Vent Alarm Settings   High Pressure (cmH2O) 55 cmH2O   Low Minute Volume (lpm) 2 L/min   Low Exhaled Vt (ml) 100 mL   RR High (bpm) 40 br/min   Apnea (secs) 20 secs   Additional Respiratoray Assessments   Humidification Source HME   Circuit Condensation Drained   Ambu Bag With Mask At Bedside Yes   Airway Clearance   Suction ET Tube   Suction Device Inline suction catheter   Sputum Method Obtained Endotracheal   Sputum Amount Large   Sputum Color/Odor Brown;Bloody   Sputum Consistency Thick; Thin   ETT    Placement Date: 12/22/22   Present on Admission/Arrival: No  Placed By: Licensed provider  Placement Verified By: Auscultation;Colorimetric ETCO2 device; Chest X-ray  Preoxygenation: Yes  Mask Ventilation: Ventilated by mask (1)  Airway Type: Cuffed  A...    Secured At 22 cm   Measured From Lips   ETT Placement Right   Secured By Commercial tube wayne   Site Assessment Dry   Cuff Pressure 30 cm H2O

## 2022-12-27 NOTE — PROGRESS NOTES
12/27/22 0332   Patient Observation   Heart Rate 78   SpO2 97 %   Breath Sounds   Right Upper Lobe Diminished   Right Middle Lobe Diminished   Right Lower Lobe Diminished   Left Upper Lobe Diminished   Left Lower Lobe Diminished   Vent Information   Equipment Changed HME;Suction catheter   Vent Mode AC/PC   Ventilator Settings   Resp Rate (Set) 16 bmp   PEEP/CPAP (cmH2O) 5   FiO2  60 %   Pressure Support (cm H2O) 0 cm H2O   Vent Patient Data (Readings)   Vt (Measured) 522 mL   Peak Inspiratory Pressure (cmH2O) 46 cmH2O   Rate Measured 16 br/min   Minute Volume (L/min) 8.4 Liters   Mean Airway Pressure (cmH2O) 16 cmH20   I:E Ratio 1:2.40   I Time/ I Time % 1.1 s   Backup Apnea On   Vent Alarm Settings   High Pressure (cmH2O) 55 cmH2O   Low Minute Volume (lpm) 2 L/min   Low Exhaled Vt (ml) 100 mL   RR High (bpm) 40 br/min   Additional Respiratoray Assessments   Humidification Source HME   Ambu Bag With Mask At Bedside Yes   Airway Clearance   Suction ET Tube   Suction Device Inline suction catheter   Sputum Method Obtained Endotracheal   Sputum Amount Large   Sputum Color/Odor Bloody; Yellow   Sputum Consistency Thick   ETT    Placement Date: 12/22/22   Present on Admission/Arrival: No  Placed By: Licensed provider  Placement Verified By: Auscultation;Colorimetric ETCO2 device; Chest X-ray  Preoxygenation: Yes  Mask Ventilation: Ventilated by mask (1)  Airway Type: Cuffed  A...    Secured At 22 cm   Measured From Lips   ETT Placement Right   Secured By Commercial tube wayne   Site Assessment Dry   Cuff Pressure 30 cm H2O   Skin Assessment   Skin Assessment Clean, dry, & intact

## 2022-12-27 NOTE — PROGRESS NOTES
Comprehensive Nutrition Assessment    Type and Reason for Visit:  Reassess    Nutrition Recommendations/Plan: If Nepro at 20-30 ml/hr restarted recommend adding Proteinex protein supplement to current tube feeding regimen with increased risk for skin breakdown and malnutrition  Water flush 60 ml every 4 hours if tube feeding restarted  Monitor nutrition progress and goals of care     Malnutrition Assessment:  Malnutrition Status: At risk for malnutrition (Comment) (tube feeding on hold) (12/27/22 1123)    Context:  Acute Illness       Nutrition Assessment:    Follow up: Tube feeding now on hold due to potential ileus vs bowel obstruction per KUB on 12/25. Big increase in weight in the past few days 106 lbs from 59 lbs, unsure accuracy. Patient's mom stated usual weight 59 lbs, patient had gained 5 lbs in the past year by increasing protein thru Ensure and protein bars. Will continue to monitor nutrition progression, medical stability and goals of care. Nutrition Related Findings:    Phos requiring repletion; Still hyponatremic but improving to 134 this am; NG to LWS with 300 ml output in the past 24 hours, clamped for medications Wound Type: Stage III, Pressure Injury       Current Nutrition Intake & Therapies:    Average Meal Intake: NPO  Average Supplements Intake: NPO  Diet NPO    Anthropometric Measures:  Height: 4' (121.9 cm)  Ideal Body Weight (IBW): 40 lbs (18 kg)       Current Body Weight: 106 lb 4 oz (48.2 kg), 147.5 % IBW. Weight Source: Bed Scale  Current BMI (kg/m2): 32.4        Weight Adjustment For: No Adjustment                 BMI Categories: Obese Class 1 (BMI 30.0-34. 9)    Estimated Daily Nutrient Needs:  Energy Requirements Based On: Kcal/kg (35-40 kcals/kg)  Weight Used for Energy Requirements: Current (27 kg)  Energy (kcal/day): 945-1080  Weight Used for Protein Requirements: Current (2-2.2 g/kg)  Protein (g/day): 54-59 g  Method Used for Fluid Requirements: 1 ml/kcal  Fluid (ml/day): Nutrition Diagnosis:   Inadequate energy intake related to impaired respiratory function as evidenced by NPO or clear liquid status due to medical condition, intubation, nutrition support - enteral nutrition    Nutrition Interventions:   Food and/or Nutrient Delivery: Continue NPO  Nutrition Education/Counseling: No recommendation at this time  Coordination of Nutrition Care: Continue to monitor while inpatient       Goals:  Previous Goal Met: No Progress toward Goal(s)  Goals: Initiate nutrition support, within 2 days       Nutrition Monitoring and Evaluation:   Behavioral-Environmental Outcomes: None Identified  Food/Nutrient Intake Outcomes: Enteral Nutrition Intake/Tolerance  Physical Signs/Symptoms Outcomes: Biochemical Data, Nutrition Focused Physical Findings, Weight    Discharge Planning:     Too soon to determine     ANNABEL, [address] [facility], [address] [place] [address],   Contact: [phone]

## 2022-12-27 NOTE — PROGRESS NOTES
12/27/22 0833   Patient Observation   Heart Rate 73   Resp 16   SpO2 97 %   Breath Sounds   Right Upper Lobe Rhonchi   Right Middle Lobe Diminished   Right Lower Lobe Diminished   Left Upper Lobe Rhonchi   Left Lower Lobe Diminished   Vent Information   Vent Mode AC/PC   Ventilator Settings   Resp Rate (Set) 16 bmp   PEEP/CPAP (cmH2O) 5   FiO2  60 %   Pressure Support (cm H2O) 0 cm H2O   Insp Time (sec) 1.1 sec   Pressure Ordered 40   Vent Patient Data (Readings)   Vt (Measured) 658 mL   Peak Inspiratory Pressure (cmH2O) 46 cmH2O   Rate Measured 16 br/min   Minute Volume (L/min) 10.5 Liters   Mean Airway Pressure (cmH2O) 17 cmH20   I:E Ratio 1:2.40   I Time/ I Time % 1.1 s   Vent Alarm Settings   High Pressure (cmH2O) 55 cmH2O   Low Minute Volume (lpm) 2 L/min   High Minute Volume (lpm) 20 L/min   RR High (bpm) 40 br/min   Apnea (secs) 20 secs   Additional Respiratoray Assessments   Humidification Source (S)  HME   Ambu Bag With Mask At Bedside Yes   Airway Clearance   Suction ET Tube   Subglottic Suction Done No   Suction Device Inline suction catheter   Sputum Method Obtained Endotracheal   Sputum Amount Moderate   Sputum Color/Odor Tan;Yellow   Sputum Consistency Thick   ETT    Placement Date: 12/22/22   Present on Admission/Arrival: No  Placed By: Licensed provider  Placement Verified By: Auscultation;Colorimetric ETCO2 device; Chest X-ray  Preoxygenation: Yes  Mask Ventilation: Ventilated by mask (1)  Airway Type: Cuffed  A...    Secured At 22 cm   Measured From Lips   ETT Placement (S)  Center   Secured By Commercial tube wayne   Site Assessment Dry   Cuff Pressure 30 cm H2O

## 2022-12-27 NOTE — PROGRESS NOTES
[facility]                                               Progress note    CC: AMS, septic shock  Summary:   Carlene Ozuna is being seen by nephrology for Acute kidney injury (MAYTE). She is a 49 y.o. female with aPMH signficiant for spina bifida, neurogenic bladder, hydrocephalus s/p  shunt who presented to the hospital 12/22/2022 with increasing somnolence. She was unarousable by the time she was in the ED and had a high grade fever. She was in septic shock with high pressor needs. Since admission she has received a large volume of fluids for resuscitation and she has now become increasingly edematous. Cr has doubled from < 0.5 to 1.0 and remains on pressors. .     Interval History  - seen at bedside  - started CRRT last night for fluid overload  - remains on levo  - 675 mL UO for yesterday, 900 mL UF, net -ve 500 mL for yesterday  - code status changed to [facility] today. Plan:   - escalate UF goal to 100 mL/h with pressor titration. - clearances appropriate. Mild alkalosis noted on ABG  - UF waxing and waning due to hypotension, trial albumin 25 g for hemodynamic support. Benny Dai MD  [facility]  Office: [phone]    Assessment:   MAYTE:  - 2/2 septic shock and resulting ATN  - baseline Cr < 0.5, indicative for sarcopenia  - Cr at time of consult was 1.0  - starting CRRT 12/26/2022 for volume management     Volume overload:  - grossly volume overloaded due to massive volume of fluids given for resucitation  - continues to requrie bicarb for acidosis management  - UO borderline  - address with CRRT     Septic shock  - pneumonia with purulence secretions seen on bronch  - irreversible ischemia of the LE due to pressors likely  - UTI with neurogenic bladder. ROS:   Review of systems is unobtainable due to current clinical state. PMH:   Past medical history, surgical history, social history, family history are reviewed and updated as appropriate.     Reviewed current medication list.   Allergies reviewed and updated as needed. PE:   Vitals:    12/27/22 1330   BP: 97/65   Pulse: 67   Resp:    Temp:    SpO2: 97%       General appearance: intubated. HEENT: no icterus, EOM intact, trachea midline. Neck : no masses, appears symmetrical and no JVD appreciated. Respiratory: ETT to vent bilateral equal chest rise. No wheeze, no crackles   Cardiovascular: Ausculation shows RRR and  4+ edema   Abdomen: abdomen is soft, non distended, no masses, no pain with palpation.   Musculoskeletal:  no joint swelling, no deformity, strength grossly normal.   Skin: no rashes, no induration, no tightening, no jaundice   Neuro:  AUDELIA      Lab Results   Component Value Date    CREATININE 0.6 12/27/2022    BUN 18 12/27/2022     (L) 12/27/2022    K 3.6 12/27/2022     12/27/2022    CO2 23 12/27/2022      Lab Results   Component Value Date    WBC 17.9 (H) 12/27/2022    HGB 8.7 (L) 12/27/2022    HCT 26.2 (L) 12/27/2022    MCV 75.6 (L) 12/27/2022    PLT 70 (L) 12/27/2022     Lab Results   Component Value Date    CALCIUM 7.4 (L) 12/27/2022    CAION 1.01 (L) 12/27/2022    PHOS 2.8 12/27/2022

## 2022-12-27 NOTE — SIGNIFICANT EVENT
Discussed with the family (sisters and mother who are HCP's) code status and overall prognosis. Patient's family said that she would not want shocks/compressions if her condition continues to deteriorate. Will change code status to [facility].     Sheridan Vang MD  [facility] Pulmonary Critical Care

## 2022-12-27 NOTE — PROGRESS NOTES
Nephrologist Dr Jaya Doran paged and notified of recent events including stopping CRRT. Reviewed recent lab work.  Per Dr Liz Barron okay to resume CRRT when HR is less than 100, change blood flow to 150 and can replace albumin once CRRT is restarted

## 2022-12-27 NOTE — PROGRESS NOTES
12/27/22 1214   Patient Observation   Heart Rate 75   Resp 16   SpO2 96 %   Vent Information   Vent Mode AC/PC   Ventilator Settings   Resp Rate (Set) 16 bmp   PEEP/CPAP (cmH2O) 5   FiO2  40 %   Pressure Support (cm H2O) 0 cm H2O   Insp Time (sec) 1.1 sec   Pressure Ordered 30   Vent Patient Data (Readings)   Vt (Measured) 415 mL   Peak Inspiratory Pressure (cmH2O) 36 cmH2O   Rate Measured 16 br/min   Minute Volume (L/min) 6.65 Liters   Mean Airway Pressure (cmH2O) 14 cmH20   I:E Ratio 1:2.40   I Time/ I Time % 1.1 s   Vent Alarm Settings   High Pressure (cmH2O) 55 cmH2O   Low Minute Volume (lpm) 2 L/min   High Minute Volume (lpm) 20 L/min   Low Exhaled Vt (ml) 200 mL   RR High (bpm) 40 br/min   Apnea (secs) 20 secs   Additional Respiratoray Assessments   Humidification Source HME   Ambu Bag With Mask At Bedside Yes   Airway Clearance   Suction ET Tube   Suction Device Inline suction catheter   Sputum Method Obtained Endotracheal   Sputum Amount Large   Sputum Color/Odor Tan;Yellow   Sputum Consistency Thick   ETT    Placement Date: 12/22/22   Present on Admission/Arrival: No  Placed By: Licensed provider  Placement Verified By: Auscultation;Colorimetric ETCO2 device; Chest X-ray  Preoxygenation: Yes  Mask Ventilation: Ventilated by mask (1)  Airway Type: Cuffed  A...    Secured At 22 cm   Measured From Lips   ETT Placement (S)  Left   Secured By Commercial tube wayne   Site Assessment Dry   Cuff Pressure 30 cm H2O

## 2022-12-27 NOTE — PROGRESS NOTES
12/26/22 2330   Patient Observation   Heart Rate (!) 101   SpO2 98 %   Ventilator Settings   Resp Rate (Set) 16 bmp   PEEP/CPAP (cmH2O) 5   FiO2  60 %   Pressure Support (cm H2O) 0 cm H2O   Vent Patient Data (Readings)   Vt (Measured) 541 mL   Peak Inspiratory Pressure (cmH2O) 46 cmH2O   Rate Measured 16 br/min   Minute Volume (L/min) 8.65 Liters   Mean Airway Pressure (cmH2O) 17 cmH20   I:E Ratio 1:2.40   Flow Sensitivity 3 L/min   I Time/ I Time % 1.1 s   Backup Apnea On   Vent Alarm Settings   High Pressure (cmH2O) 55 cmH2O   Low Minute Volume (lpm) 2 L/min   Low Exhaled Vt (ml) 100 mL   RR High (bpm) 40 br/min   Apnea (secs) 20 secs   Additional Respiratoray Assessments   Humidification Source HME   Ambu Bag With Mask At Bedside Yes   Airway Clearance   Suction ET Tube   Suction Device Inline suction catheter   Sputum Method Obtained Endotracheal   Sputum Amount Large   Sputum Color/Odor Yellow;Bloody   Sputum Consistency Thick   ETT    Placement Date: 12/22/22   Present on Admission/Arrival: No  Placed By: Licensed provider  Placement Verified By: Auscultation;Colorimetric ETCO2 device; Chest X-ray  Preoxygenation: Yes  Mask Ventilation: Ventilated by mask (1)  Airway Type: Cuffed  A...    Secured At 22 cm   Measured From Lips   ETT Placement Left   Secured By Commercial tube wayne   Site Assessment Dry   Skin Assessment   Skin Assessment Clean, dry, & intact

## 2022-12-27 NOTE — PROGRESS NOTES
CRRT started and within 20 minutes of CRRT starting patients HR in 160s ST. BP 84/61. CRRT stopped due to tachycardia. Nocturnist Dr Grisel Charles notified of new events received orders for lab draws. Patient appears agitated and looking around room instructed to increase sedation.  Fentanyl increased to 150mcg/hr

## 2022-12-27 NOTE — PROGRESS NOTES
VASCULAR     Seen for BLE ischemia. Remains intubated. HD line placed last night. Intermittently hypotensive - increased Levophed on CRRT. Less swollen  BLE with demarcated nonblanching cyanosis to R knee & proximal L calf     A/P:     Irreversible BLE ischemia. Nonsalvageable. If pt survives will require B AKAs. No plans at this time.                  Venu Alvarado

## 2022-12-27 NOTE — PROGRESS NOTES
Pulmonary & Critical Care Medicine ICU Progress Note      Events of Last 24 hours:   Pt received pRBC this morning for Hgb < 7. She is intubated and sedated. UOP is still low. Had episode of tachycardia to 160's. Invasive Lines: PICC D#none CVC D#4  Art Line D#none            Vitals:  BP 96/70   Pulse 65   Temp 98 °F (36.7 °C) (Bladder)   Resp 19   Ht 4' (1.219 m)   Wt 106 lb 4.2 oz (48.2 kg)   SpO2 97%   BMI 32.43 kg/m²    Tmax:  CVP:        Intake/Output Summary (Last 24 hours) at 12/27/2022 8900  Last data filed at 12/27/2022 0701  Gross per 24 hour   Intake 1478.46 ml   Output 1878 ml   Net -399.54 ml       EXAM:  Physical Exam  Constitutional:       Appearance: She is toxic-appearing. Comments: Intubated and sedated   HENT:      Head: Normocephalic and atraumatic. Nose: Nose normal.   Eyes:      General: No scleral icterus. Right eye: No discharge. Left eye: No discharge. Cardiovascular:      Rate and Rhythm: Normal rate. Heart sounds: No murmur heard. No gallop. Pulmonary:      Effort: Pulmonary effort is normal. No respiratory distress. Breath sounds: No wheezing or rales. Abdominal:      General: There is distension. Comments: Firm, edematous   Musculoskeletal:         General: Swelling (1+ edema) present. Comments: Lower extremities with mottling, ischemia. Upper peripheral extremities with mottling.  Contracted in lower ext   Neurological:      Comments: Intubated and sedated        Medications:  Scheduled Meds:   docusate  100 mg Per NG tube BID    senna  1 tablet Oral Daily    mupirocin   Nasal BID    chlorhexidine  15 mL Mouth/Throat BID    carboxymethylcellulose PF  1 drop Both Eyes 6 times per day    meropenem  1,000 mg IntraVENous Q12H    collagenase   Topical Daily    methIMAzole  5 mg Oral Daily    sodium chloride flush  5-40 mL IntraVENous 2 times per day    lidocaine 1 % injection  5 mL IntraDERmal Once    pantoprazole  40 mg IntraVENous Daily       PRN Meds:  Lip Balm, sodium chloride, potassium chloride, magnesium sulfate, calcium gluconate **OR** calcium gluconate **OR** calcium gluconate **OR** calcium gluconate, sodium phosphate IVPB **OR** sodium phosphate IVPB **OR** sodium phosphate IVPB **OR** sodium phosphate IVPB, glucose, dextrose bolus **OR** dextrose bolus, glucagon (rDNA), dextrose, sodium chloride flush, sodium chloride, acetaminophen **OR** acetaminophen, sodium chloride    Results:  CBC:   Recent Labs     12/26/22  0455 12/26/22 2100 12/27/22  0535   WBC 22.6* 5.3 17.9*   HGB 7.1* 7.2* 6.1*   HCT 22.3* 22.8* 18.4*   MCV 76.4* 76.9* 75.6*   PLT 90* 69* 70*     BMP:   Recent Labs     12/26/22  0455 12/26/22 2100 12/27/22  0535   * 134* 134*   K 3.3* 3.9 3.5   CL 92* 97* 98*   CO2 26 24 23   PHOS  --  3.1 2.3*   BUN 33* 34* 24*   CREATININE 1.1 1.2* 0.8     LIVER PROFILE:   Recent Labs     12/25/22  0400 12/26/22 0455 12/26/22 2100 12/27/22  0535   AST 1,125* 349* 264* 243*   ALT 1,167* 560* 413* 355*   BILIDIR 0.4* <0.2  --   --    BILITOT 0.6 0.4 0.7 0.8   ALKPHOS 330* 214* 310* 259*     PT/INR: No results for input(s): PROTIME, INR in the last 72 hours. APTT: No results for input(s): APTT in the last 72 hours. UA:No results for input(s): NITRITE, COLORU, PHUR, LABCAST, WBCUA, RBCUA, MUCUS, TRICHOMONAS, YEAST, BACTERIA, CLARITYU, SPECGRAV, LEUKOCYTESUR, UROBILINOGEN, BILIRUBINUR, BLOODU, GLUCOSEU, AMORPHOUS in the last 72 hours. Invalid input(s): Desmond Jamie    Cultures:  Culture, Blood 2 [[id number]] Collected: 12/23/22 0300   Order Status: Completed Specimen: Blood Updated: 12/27/22 0415    Culture, Blood 2 No Growth after 4 days of incubation.    Narrative:     ORDER#: [id number]                          ORDERED BY: Jasbir Dia   SOURCE: Blood                              COLLECTED:  12/23/22 03:00   ANTIBIOTICS AT MICHAEL.:                      RECEIVED :  12/23/22 07:40   If child <=2 yrs old please draw pediatric bottle. ~Blood Culture #2     Culture, Blood 1 [[id number]] (Abnormal)  Collected: 12/22/22 1600   Order Status: Completed Specimen: Blood Updated: 12/25/22 0640    Organism Escherichia coli DNA Detected Abnormal     Blood Culture, Routine See additional report for complete BCID panel. Organism Escherichia coli Abnormal     Blood Culture, Routine --    POSITIVE for   Isolated one of two sets    Narrative:     ORDER#: [id number]                          ORDERED BY: Nhung Spencer   SOURCE: Blood No site given                COLLECTED:  12/22/22 16:00   ANTIBIOTICS AT MICHAEL.:                      RECEIVED :  12/23/22 04:03   CALL  [facility]  [facility] tel. [phone],   Previous panic on this admission - call not needed per SOP, 12/23/2022 15:46,   by [address] [place] [address]   Microbiology results called to and read back by Francisca licea,   12/23/2022 11:38, by [facility]   Microbiology results called to and read back by justice isabel,   12/23/2022 11:33, by [facility]   If child <=2 yrs old please draw pediatric bottle. ~Blood Culture 1   Culture, Urine [[id number]] (Abnormal) Collected: 12/23/22 0850   Order Status: Completed Specimen: Urine, clean catch Updated: 12/24/22 0721    Organism Gram negative geovanny Abnormal     Urine Culture, Routine --    <10,000 CFU/ml   No further workup    Narrative:     ORDER#: [id number]                          ORDERED BY: Lisandra Multani   SOURCE: Urine Clean Catch                  COLLECTED:  12/23/22 08:50   ANTIBIOTICS AT MICHAEL.:                      RECEIVED :  12/23/22 09:40       Films:  12/26/22  Impression   Central venous catheter is in the mid SVC       The endotracheal tube has been retracted. It is slightly beyond the thoracic   inlet.                Assessment:  Septic shock  Lactic acidosis  Acute hypoxic respiratory failure  E.coli bacteremia  Ileus   UTI  Spina bifida  Microcytic anemia  Thrombocytopenia  Lower extremity ischemia  Shock liver  Hypoglycemia  Hyperthyroidism      Plan:  - Cont fentanyl 150mcg/hr and Precedex. Will try drug holiday tomorrow  - cont Levo 6mcg with goal MAP > 65 or SBP > 90, lactic acid ordered  - cont full vent support, ABG ordered and decreased Vt on AC/PC. FiO2 decreased to 40%. Will try SBT tomorrow  - Vent bundle  - NGT to suction currently with ileus, will increase bowel regimen. NPO  - Protonix for GI ppx  - Monitor LFT's, likely from hypoperfusion, slowly improving  - cont CRRT, UOP slowly improving, cont sullivan, renal panel and Mg2+ q6h  - Narrow abx to Rocephin for E.coli bacteremia for full 10 day course  - Hgb < 7 this morning, given 1 unit pRBC. Repeat Hgb ordered  - SCD's, hold chemical ppx  - Hypoglycemic this morning and not getting feeds, will start D10 gtt at low rate if still < 70  - cont home Methimazole  - Vascular following for limb ischemia - will require b/l AKA in future when out of critical illness     Ppx/Sullivan/Lines  - R fem  - L IJ vasc cath  - SCD's, Protonix    Critical care time spent reviewing labs/films, examining patient, collaborating with other physicians but excluding procedures for life threatening organ failure is 39 minutes.         Electronically signed by:  Doe Gotti MD    12/27/2022    7:14 AM.

## 2022-12-27 NOTE — PROGRESS NOTES
12/27/22 1603   Patient Observation   Heart Rate 88   Resp 14   SpO2 96 %   Breath Sounds   Right Upper Lobe Rhonchi;Crackles   Right Middle Lobe Diminished;Crackles   Right Lower Lobe Diminished;Crackles   Left Upper Lobe Rhonchi;Crackles   Left Lower Lobe Diminished;Crackles   Vent Information   Vent Mode AC/PC   Ventilator Settings   Resp Rate (Set) 14 bmp   PEEP/CPAP (cmH2O) 5   FiO2  40 %   Pressure Support (cm H2O) 0 cm H2O   Insp Time (sec) 1.1 sec   Pressure Ordered 30   Vent Patient Data (Readings)   Vt (Measured) 438 mL   Peak Inspiratory Pressure (cmH2O) 36 cmH2O   Rate Measured 14 br/min   Minute Volume (L/min) 6.12 Liters   Mean Airway Pressure (cmH2O) 13 cmH20   I:E Ratio 1:2.90   I Time/ I Time % 1.1 s   Vent Alarm Settings   High Pressure (cmH2O) 45 cmH2O   Low Minute Volume (lpm) 2 L/min   High Minute Volume (lpm) 20 L/min   Low Exhaled Vt (ml) 200 mL   RR High (bpm) 40 br/min   Apnea (secs) 20 secs   Additional Respiratoray Assessments   Humidification Source HME   Ambu Bag With Mask At Bedside Yes   Airway Clearance   Suction ET Tube   Suction Device Inline suction catheter   Sputum Method Obtained Endotracheal   Sputum Amount Moderate   Sputum Color/Odor Tan   Sputum Consistency Thick   ETT    Placement Date: 12/22/22   Present on Admission/Arrival: No  Placed By: Licensed provider  Placement Verified By: Auscultation;Colorimetric ETCO2 device; Chest X-ray  Preoxygenation: Yes  Mask Ventilation: Ventilated by mask (1)  Airway Type: Cuffed  A...    Secured At 22 cm   Measured From Lips   ETT Placement (S)  Right   Secured By Commercial tube wayne   Site Assessment Dry   Cuff Pressure 30 cm H2O

## 2022-12-27 NOTE — PROGRESS NOTES
Hospitalist Progress Note      PCP: Alea Palencia DO    Date of Admission: 12/22/2022    Chief Complaint: [address] Course:   49 y.o. female who presented to [facility] with AMS. Patient has a history of spinal bifida and mental retardation. She is wheel chair bound and her mother is her caregiver and decision maker. Per patient's mother, patient started seeming fatigued a few hours ago. This quickly progressed and patient started to seem somnolent. By the time she presented to the ED, patient was unarousable. She had a high fever and severe tachycardia. She regained consciousness once IVF were started. Patient has a neurogenic bladder and straight caths multiple times per day. She had evidence of UTI on a UA. Patient had good response to IVF but remains critically ill in ED.     Subjective: intubated, sedated       Medications:  Reviewed    Infusion Medications    sodium chloride      prismaSATE BGK 4/2.5 1,000 mL/hr at 12/27/22 0502    prismaSATE BGK 4/2.5 500 mL/hr at 12/27/22 0030    prismaSATE BGK 4/2.5 500 mL/hr at 12/27/22 0030    vasopressin (Septic Shock) infusion Stopped (12/25/22 0903)    fentaNYL 150 mcg/hr (12/27/22 0800)    dextrose      sodium chloride      dexmedetomidine 1 mcg/kg/hr (12/27/22 0800)    sodium chloride      norepinephrine 8 mcg/min (12/27/22 0800)     Scheduled Medications    docusate  100 mg Per NG tube BID    senna  1 tablet Oral Daily    mupirocin   Nasal BID    chlorhexidine  15 mL Mouth/Throat BID    carboxymethylcellulose PF  1 drop Both Eyes 6 times per day    meropenem  1,000 mg IntraVENous Q12H    collagenase   Topical Daily    methIMAzole  5 mg Oral Daily    sodium chloride flush  5-40 mL IntraVENous 2 times per day    lidocaine 1 % injection  5 mL IntraDERmal Once    pantoprazole  40 mg IntraVENous Daily     PRN Meds: Lip Balm, sodium chloride, potassium chloride, magnesium sulfate, calcium gluconate **OR** calcium gluconate **OR** calcium gluconate **OR** calcium gluconate, sodium phosphate IVPB **OR** sodium phosphate IVPB **OR** sodium phosphate IVPB **OR** sodium phosphate IVPB, glucose, dextrose bolus **OR** dextrose bolus, glucagon (rDNA), dextrose, sodium chloride flush, sodium chloride, acetaminophen **OR** acetaminophen, sodium chloride      Intake/Output Summary (Last 24 hours) at 12/27/2022 0827  Last data filed at 12/27/2022 0800  Gross per 24 hour   Intake 1437.07 ml   Output 1878 ml   Net -440.93 ml         Physical Exam Performed:    /69   Pulse 68   Temp 97.4 °F (36.3 °C) (Bladder)   Resp 16   Ht 4' (1.219 m)   Wt 106 lb 4.2 oz (48.2 kg)   SpO2 96%   BMI 32.43 kg/m²     General appearance:  intubated, sedated  HEENT:  Normal cephalic, atraumatic without obvious deformity. Pupils equal, round, and reactive to light. Extra ocular muscles intact. Conjunctivae/corneas clear. Neck: Supple, with full range of motion. No jugular venous distention. Trachea midline. Respiratory:  Normal respiratory effort. Clear to auscultation, bilaterally without Rales/Wheezes/Rhonchi. Cardiovascular:  RRR with normal S1/S2 without murmurs, rubs or gallops. Abdomen: Soft, non-tender, non-distended with normal bowel sounds. Musculoskeletal:  No clubbing, cyanosis or edema bilaterally. Full range of motion without deformity. Chronic LE contractures, poor tone.  Feet cyanotic with diffuse mottling  Neurologic: Intubated and sedataed        Labs:   Recent Labs     12/26/22  0455 12/26/22  2100 12/27/22  0535   WBC 22.6* 5.3 17.9*   HGB 7.1* 7.2* 6.1*   HCT 22.3* 22.8* 18.4*   PLT 90* 69* 70*       Recent Labs     12/26/22  0455 12/26/22  2100 12/27/22  0535   * 134* 134*   K 3.3* 3.9 3.5   CL 92* 97* 98*   CO2 26 24 23   BUN 33* 34* 24*   CREATININE 1.1 1.2* 0.8   CALCIUM 6.6* 6.8* 7.9*   PHOS  --  3.1 2.3*       Recent Labs     12/25/22  0400 12/26/22  0455 12/26/22  2100 12/27/22  0535   AST 1,125* 349* 264* 243*   ALT 1,167* 560* 413* 355* BILIDIR 0.4* <0.2  --   --    BILITOT 0.6 0.4 0.7 0.8   ALKPHOS 330* 214* 310* 259*       No results for input(s): INR in the last 72 hours. No results for input(s): Payal Shannan in the last 72 hours. Urinalysis:      Lab Results   Component Value Date/Time    NITRU Negative 12/22/2022 03:05 PM    WBCUA >100 12/22/2022 03:05 PM    BACTERIA 4+ 12/22/2022 03:05 PM    RBCUA None seen 12/22/2022 03:05 PM    BLOODU LARGE 12/22/2022 03:05 PM    SPECGRAV >=1.030 12/22/2022 03:05 PM    GLUCOSEU Negative 12/22/2022 03:05 PM       Radiology:  XR CHEST PORTABLE   Final Result   Central venous catheter is in the mid SVC      The endotracheal tube has been retracted. It is slightly beyond the thoracic   inlet. XR ABDOMEN (KUB) (SINGLE AP VIEW)   Final Result   Limited study. Distended loops of bowel are noted. This has worsened. Findings could be related to ileus versus bowel obstruction. XR CHEST PORTABLE   Final Result   1. Right base opacity with small pleural effusion. If patient has clinical   symptoms of infection, differential would include pneumonia. In the absence   of infectious symptoms, atelectasis can have the same appearance. .   2. ETT tip at the level of the susanne. CT ABDOMEN PELVIS WO CONTRAST Additional Contrast? None   Final Result   1. Technically limited CT due to the patient's underlying medical condition   and artifact from positioning, hardware and lack of IV contrast.   2. Probable sacral decubitus ulcer for which correlation with physical exam   is advised. 3. Questionable inflammation at the left hip joint which may simply be due to   the patient's underlying medical condition and stasis. Osteoarthritis or   septic pattern of arthritis cannot be definitively excluded. 4. Heterogeneity of the right kidney that is difficult to evaluate. An   underlying pattern of inflammation/pyelonephritis may be considered in the   appropriate clinical setting.   No obvious sign of obstructive uropathy. 5. Bibasilar pleural fluid and airspace changes. Correlate with pulmonary   symptoms. XR ABDOMEN (KUB) (SINGLE AP VIEW)   Final Result   Enteric tube is in the stomach         XR CHEST PORTABLE   Final Result   Endotracheal tube is at the susanne         XR CHEST PORTABLE   Final Result   No radiographic evidence of acute pulmonary disease. IP CONSULT TO HOSPITALIST  IP CONSULT TO CRITICAL CARE  IP CONSULT TO CRITICAL CARE  IP CONSULT TO VASCULAR SURGERY  IP CONSULT TO NEPHROLOGY    Assessment/Plan:    Active Hospital Problems    Diagnosis     Ileus ([facility] [address] [K56.7]      Priority: Medium    Dialysis patient [facility]) [Z99.2]      Priority: Medium    Septic shock ([facility] [address] [A41.9, R65.21]      Priority: Medium    Thrombocytopenia ([facility] [address] [D69.6]      Priority: Medium    Bacteremia due to Escherichia coli [R78.81, B96.20]      Priority: Medium    Hypoglycemia [E16.2]      Priority: Medium    Shock liver [K72.00]      Priority: Medium    Pulmonary infiltrates [R91.8]      Priority: Medium    Severe sepsis ([facility] [address] [A41.9, R65.20]      Priority: Medium    Other secondary scoliosis, 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]      Priority: Medium    Lactic acidosis [E87.20]      Priority: Medium    Pyuria [R82.81]      Priority: Medium    Elevated alkaline phosphatase level [R74.8]      Priority: Medium    Metabolic acidemia [Y89.95]      Priority: Medium    Elevated procalcitonin [R79.89]      Priority: Medium    Bandemia [D72.825]      Priority: Medium    SVT (supraventricular tachycardia) ([facility]) [I47.1]      Priority: Medium    Hyperthyroidism [E05.90]      Priority: Medium    Spina bifida ([address] [Q05.9]      Septic shock  - presented with tachycardia, fever, leukocytosis.  Lactate 10.2 on admission  - continue IVF  - blood, urine cultures positive of E coli  - suspect pyelonephritis based on CT abd/pel and culture results  - wean pressors as able  - continue merrem    Possible pneumonia Bronch with mucopurulent plugs  Cultures pending   Continue merrem     Acute hypoxic respiratory failure  - 2/2 above  - pulmonology consulted  - intubated 12/22  - vent management per pulm  Bronch 12/26 - thick mucous plugs      Acute metabolic encephalopathy  - 2/2 above  - supportive care     Lactic acidosis  - severe, 2/2 sepsis  - continue bicarb gtt  - vascular surgery consulted for severe mottling  - no surgical intervention warranted  - continue to monitor    MAYTE  - 2/2 shock  - nephrology consulted  - Cr trending up consistently but slowly likely related to extremely small mass  - continues with oliguria  Significant positive fluid balance. Nephro recommending starting CRRT for volume optimization and lactate clearance. Limb ischemia  - due to shock and pressor use  - vascular surgery consulted  - no plan for surgical intervention   Irreversible BLE ischemia   If patient survives will require B AKAs     Shock liver - Continue to follow LFTs, downtrending today.       Hyperthyroidism  - TSH WNL  - continue home methimazole     Anemia  Thrombocytopenia  - Heparin was DC  HIT ordered   - chronic, stable  - holding home Fe supplement  - continue to monitor  Hgb 6.1 today, transfuse pRBC  Transfuse for Hgb < 7     HTN  - hypotension on admission  - holding metoprolol     Spina Bifida  - paraplegic at baseline  - supportive care    DVT Prophylaxis: SCDs due to thrombocytopenia, HIT panel pending   Diet: ADULT TUBE FEEDING; Orogastric; Renal Formula; Continuous; 20; Yes; 0; Other (specify); trophic; 20; 30; Q 4 hours  Code Status: Full Code  PT/OT Eval Status: ordered    Dispo - ICU    Appropriate for A1 Discharge Unit: June Live DO

## 2022-12-27 NOTE — PLAN OF CARE
Problem: Discharge Planning  Goal: Discharge to home or other facility with appropriate resources  Outcome: Progressing  Flowsheets (Taken 12/27/2022 0800)  Discharge to home or other facility with appropriate resources:   Identify barriers to discharge with patient and caregiver   Arrange for needed discharge resources and transportation as appropriate   Identify discharge learning needs (meds, wound care, etc)   Refer to discharge planning if patient needs post-hospital services based on physician order or complex needs related to functional status, cognitive ability or social support system     Problem: Safety - Medical Restraint  Goal: Remains free of injury from restraints (Restraint for Interference with Medical Device)  Description: INTERVENTIONS:  1. Determine that other, less restrictive measures have been tried or would not be effective before applying the restraint  2. Evaluate the patient's condition at the time of restraint application  3. Inform patient/family regarding the reason for restraint  4.  Q2H: Monitor safety, psychosocial status, comfort, nutrition and hydration  Outcome: Progressing  Flowsheets  Taken 12/27/2022 1200  Remains free of injury from restraints (restraint for interference with medical device): Every 2 hours: Monitor safety, psychosocial status, comfort, nutrition and hydration  Taken 12/27/2022 1000  Remains free of injury from restraints (restraint for interference with medical device): Every 2 hours: Monitor safety, psychosocial status, comfort, nutrition and hydration  Taken 12/27/2022 0800  Remains free of injury from restraints (restraint for interference with medical device): Every 2 hours: Monitor safety, psychosocial status, comfort, nutrition and hydration     Problem: Pain  Goal: Verbalizes/displays adequate comfort level or baseline comfort level  Outcome: Progressing  Flowsheets (Taken 12/27/2022 0800)  Verbalizes/displays adequate comfort level or baseline comfort level: Assess pain using appropriate pain scale     Problem: Nutrition Deficit:  Goal: Optimize nutritional status  Outcome: Progressing     Problem: Skin/Tissue Integrity  Goal: Absence of new skin breakdown  Description: 1. Monitor for areas of redness and/or skin breakdown  2. Assess vascular access sites hourly  3. Every 4-6 hours minimum:  Change oxygen saturation probe site  4. Every 4-6 hours:  If on nasal continuous positive airway pressure, respiratory therapy assess nares and determine need for appliance change or resting period.   Outcome: Progressing     Problem: Respiratory - Adult  Goal: Achieves optimal ventilation and oxygenation  Outcome: Progressing     Problem: Skin/Tissue Integrity - Adult  Goal: Incisions, wounds, or drain sites healing without S/S of infection  Outcome: Progressing  Goal: Oral mucous membranes remain intact  Outcome: Progressing     Problem: Genitourinary - Adult  Goal: Urinary catheter remains patent  Outcome: Progressing     Problem: Metabolic/Fluid and Electrolytes - Adult  Goal: Electrolytes maintained within normal limits  Outcome: Progressing  Goal: Hemodynamic stability and optimal renal function maintained  Outcome: Progressing

## 2022-12-27 NOTE — PROGRESS NOTES
Shift: [id number]    Admitting diagnosis: Sepsis    Presentation to hospital: Mother called EMS when she found pt with AMS     Surgery: no     Nursing assessment at handoff  stable    Emergency Contact/POA:Mom Chaparor Rosado  Family updated: yes - Mom     Most recent vitals: BP 96/70   Pulse 65   Temp 98 °F (36.7 °C) (Bladder)   Resp 19   Ht 4' (1.219 m)   Wt 106 lb 4.2 oz (48.2 kg)   SpO2 97%   BMI 32.43 kg/m²      Rhythm: Normal Sinus Rhythm 70-90's (Poor waveform despite changing electrodes, cable & leads)    NC/HFNC- NA   Respiratory support: - Ventilator Settings:    Vt (Set, mL): 400 mL  Resp Rate (Set): 16 bmp  FiO2 : 60 %    PEEP/CPAP (cmH2O): 5       Vent days: Day 4    Increased O2 requirements: yes - 40% prior to VasCath but required increase to 70% afterwards    Admission weight Weight: 59 lb (26.8 kg)  Today's weight   Wt Readings from Last 1 Encounters:   12/26/22 106 lb 4.2 oz (48.2 kg)         UOP >30ml/hr: yes      Byrnes need assessed each shift: yes, pt has 4+ labial edema    Restraints: yes,   Order current and documentation up to date?  Yes    Lines/Drains  LDA Insertion Date Discontinued Date Dressing Changes   PIV  x2     TLC  12/22 rt fem     Arterial       Byrnes  12/22     Vas Cath 12/26 LJ     ETT  12/22 #7.5     Surgical drains        Night Shift Hospitalist Interventions    Problem(Brief) Date Time Intervention Physician contacted   H 6.1 12-27 0700 transfuse adike                                        Drip rates at handoff:    sodium chloride      prismaSATE BGK 4/2.5 1,000 mL/hr at 12/27/22 0502    prismaSATE BGK 4/2.5 500 mL/hr at 12/27/22 0030    prismaSATE BGK 4/2.5 500 mL/hr at 12/27/22 0030    vasopressin (Septic Shock) infusion Stopped (12/25/22 0903)    fentaNYL 150 mcg/hr (12/27/22 0701)    dextrose      sodium chloride      dexmedetomidine 1 mcg/kg/hr (12/27/22 0701)    sodium chloride      norepinephrine 8 mcg/min (12/27/22 0701)       Hospital Course Daily Updates:  Admit Day# 4  12/26:. .................................................................................. Torey Rony Lab Data:   CBC:   Recent Labs     12/26/22  2100 12/27/22  0535   WBC 5.3 17.9*   HGB 7.2* 6.1*   HCT 22.8* 18.4*   MCV 76.9* 75.6*   PLT 69* 70*       BMP:    Recent Labs     12/26/22  2100 12/27/22  0535   * 134*   K 3.9 3.5   CO2 24 23   BUN 34* 24*   CREATININE 1.2* 0.8       LIVR:   Recent Labs     12/26/22  2100 12/27/22  0535   * 243*   * 355*       PT/INR:   Recent Labs     12/26/22  2100 12/27/22  0535   PROT 3.8* 4.0*       APTT: No results for input(s): APTT in the last 72 hours.   ABG:   Recent Labs     12/25/22  0426   PHART 7.476*   WDT2EWK 33.8*   PO2ART 90.4       Consults (if GI or Nephrology- which group?)-  [facility] nephrology

## 2022-12-28 NOTE — ACP (ADVANCE CARE PLANNING)
Advance Care Planning     Advance Care Planning (Inpatient)  Conversation Note      Date of ACP Conversation: 12/28/2022     Conversation Conducted with:  Healthcare Decision Maker: Named in Advance Directive or Healthcare Power of  (name) Isis Awad    ACP: Jules Greenberg RN    Health Care Decision Maker:     Current Designated Health Care Decision Maker:     Primary Decision Maker: Leah Ill - Parent - [phone]    Secondary Decision Maker: Raquel Rose - Brother/SisterTania Sloop - [phone]    Supplemental (Other) Decision Maker: Kevin Le - Brother/Sister - [phone]  Click here to complete [address] including section of the Healthcare Decision Maker Relationship (ie \"Primary\")  Today we documented Decision Maker(s) consistent with ACP documents on file. Palliative Care Initial Note  Palliative Care Admit date:  12/28/22  Reason for c/s:  GOC, Code status, Family support    Advance Directives: Reviewed the HCPOA in this EMR and pt designated her mom, John Ulloa, as her healthcare proxy. The hcpoa indicates pt did not execute a Living Will    Pt currently has a DNRCC-A code status which family elected to amend yesterday after having the benefit of d/w Pulm. Plan of care/goals:  Family mt w/ pts mom, two sisters and АЛЕКСАНДР. They report the benefit of ongoing d/w provider. Yary Acosta was able to verb'd understanding to family of her earlier d/w Pulm this am.  All present acknowledge pts current medical challenges and believe her to have limited reserves overall. Moreover, as family contemplate pt needing NH placement for the foreseeable future, bilat amputations, the likelihood for recurrent hospitalizations and infections, they see these ongoing challenges as an impediment to pts quality of life.   Mom said \"Dr. Siddharth Hernandez told me he tell us if she isn't making improvements, although he really said that today, and if it's time for comfort care.\"     We have discussed comfort care and, should they elect that road, that team would w/d MV, pressor, CRRT in favor of allowing natural death. Family do not believe pt would have necessary reserves for surviving much beyond hours to days. Social/Spiritual:  Family shared feeling pt has endured times of significant hardship and suffering throughout her life. PTA, pt lived @ home w/ mom; she was the middle dtr of three. Jerry Solis worked @ [employer] and took pride in her ability to complete necessary tasks and consistently show up for scheduled shifts.  to make ongoing support visits. Family have consensus that they don't want pt to suffer for no meaningful benefit. Plan:  Con't support around goc discussions. Family would like to remain hopeful but are very realistic as to pts challenges and, if pt is not expected to improve, they would be amenable to comfort & w/d of care    Reason for consult:  _X_ Advance Care Planning  ___ Transition of Care Planning  _X_ Psychosocial/Spiritual Support  ___ Symptom Management    Care Preferences    Ventilation: \"If you were in your present state of health and suddenly became very ill and were unable to breathe on your own, what would your preference be about the use of a ventilator (breathing machine) if it were available to you? \"      Would the patient desire the use of ventilator (breathing machine)?: yes, for now. Would further amend if comfort care elected. \"If your health worsens and it becomes clear that your chance of recovery is unlikely, what would your preference be about the use of a ventilator (breathing machine) if it were available to you? \"     Would the patient desire the use of ventilator (breathing machine)?: ACP discussion later today      Resuscitation  \"CPR works best to restart the heart when there is a sudden event, like a heart attack, in someone who is otherwise healthy.  Unfortunately, CPR does not typically restart the heart for people who have serious health conditions or who are very sick. \"    \"In the event your heart stopped as a result of an underlying serious health condition, would you want attempts to be made to restart your heart (answer \"yes\" for attempt to resuscitate) or would you prefer a natural death (answer \"no\" for do not attempt to resuscitate)? \" no    Length of ACP Conversation in minutes:  60    Conversation Outcomes:  [x] ACP discussion completed  [] Existing advance directive reviewed with patient; no changes to patient's previously recorded wishes  [] New Advance Directive completed  [] Portable Do Not Rescitate prepared for Provider review and signature  [] POLST/POST/MOLST/MOST prepared for Provider review and signature

## 2022-12-28 NOTE — PLAN OF CARE
Problem: Safety - Medical Restraint  Goal: Remains free of injury from restraints (Restraint for Interference with Medical Device)  Description: INTERVENTIONS:  1. Determine that other, less restrictive measures have been tried or would not be effective before applying the restraint  2. Evaluate the patient's condition at the time of restraint application  3. Inform patient/family regarding the reason for restraint  4. Q2H: Monitor safety, psychosocial status, comfort, nutrition and hydration  Outcome: Progressing  Flowsheets  Taken 12/28/2022 0800 by Jennifer Hanna RN  Remains free of injury from restraints (restraint for interference with medical device): Every 2 hours: Monitor safety, psychosocial status, comfort, nutrition and hydration    Problem: Pain  Goal: Verbalizes/displays adequate comfort level or baseline comfort level  Outcome: Progressing  Problem: Skin/Tissue Integrity  Goal: Absence of new skin breakdown  Description: 1. Monitor for areas of redness and/or skin breakdown  2. Assess vascular access sites hourly  3. Every 4-6 hours minimum:  Change oxygen saturation probe site  4. Every 4-6 hours:  If on nasal continuous positive airway pressure, respiratory therapy assess nares and determine need for appliance change or resting period.   Outcome: Progressing     Problem: Skin/Tissue Integrity - Adult  Goal: Incisions, wounds, or drain sites healing without S/S of infection  Outcome: Progressing  Flowsheets  Taken 12/28/2022 0944  Incisions, Wounds, or Drain Sites Healing Without Sign and Symptoms of Infection:   TWICE DAILY: Assess and document skin integrity   TWICE DAILY: Assess and document dressing/incision, wound bed, drain sites and surrounding tissue  Taken 12/28/2022 0800  Incisions, Wounds, or Drain Sites Healing Without Sign and Symptoms of Infection:   TWICE DAILY: Assess and document skin integrity   TWICE DAILY: Assess and document dressing/incision, wound bed, drain sites and surrounding tissue     Problem: Metabolic/Fluid and Electrolytes - Adult  Goal: Electrolytes maintained within normal limits  Outcome: Progressing  Flowsheets (Taken 12/28/2022 0800)  Electrolytes maintained within normal limits: Monitor labs and assess patient for signs and symptoms of electrolyte imbalances     Verbalizes/displays adequate comfort level or baseline comfort level: Assess pain using appropriate pain scale

## 2022-12-28 NOTE — PROGRESS NOTES
12/28/22 1116   Encounter Summary   Encounter Overview/Reason  Spiritual/Emotional Needs   Service Provided For: Family; Patient not available   Referral/Consult From: [address] Family members;Parent   Last Encounter  12/28/22  (Family meeting today. Follow-up after and prn)   Complexity of Encounter Moderate   Begin Time 1030   End Time  1117   Total Time Calculated 47 min   Encounter    Type Family Care   Grief, Loss, and Adjustments   Type Life Adjustments; Adjustment to illness   Assessment/Intervention/Outcome   Assessment Calm;Decisional conflict; Concerns with suffering   Intervention Active listening;Discussed illness injury and its impact; Explored/Affirmed feelings, thoughts, concerns;Prayer (assurance of)/[place]   Outcome Engaged in conversation;Expressed feelings, needs, and concerns;Receptive   Plan and Referrals   Plan/Referrals Continue Support (comment)    follow-up support. Mother at bedside states she is realistic that patient will never be the person she was and the family meeting today will hopefully help all family members have the same information. Mother states patient has \"3 guardians\" and they will make a decision together. Mother is openly sharing thoughts and challenges with the decisions ahead. \"I don't want to play God. \"  Yeimi Salazar offered to be present for support during meeting, which she declined. \"Maybe later. \"  Mother shared memories and photos of the Elisabeth Bamberger. \"  We talked about the multiple losses in the family and the new baby that is due any time. Offered prayer as requested.  will continue visits for support.

## 2022-12-28 NOTE — PROGRESS NOTES
Shift: [id number]    Admitting diagnosis: Sepsis    Presentation to hospital: Mother called EMS when she found pt with AMS     Surgery: no     Nursing assessment at handoff  stable    Emergency Contact/POA:Mom Ava Johnson  Family updated: yes - Mom     Most recent vitals: /73   Pulse 84   Temp 98.4 °F (36.9 °C) (Bladder)   Resp 14   Ht 4' (1.219 m)   Wt 106 lb 4.2 oz (48.2 kg)   SpO2 98%   BMI 32.43 kg/m²      Rhythm: Normal Sinus Rhythm 70-80's   NC/HFNC- NA   Respiratory support: - Ventilator Settings:    Vt (Set, mL): 400 mL  Resp Rate (Set): 14 bmp  FiO2 : 40 %    PEEP/CPAP (cmH2O): 5       Vent days: Day 5    Increased O2 requirements: no    Admission weight Weight: 59 lb (26.8 kg)  Today's weight   Wt Readings from Last 1 Encounters:   12/26/22 106 lb 4.2 oz (48.2 kg)         UOP >30ml/hr: yes      Byrnes need assessed each shift: yes, pt has 4+ labial edema    Restraints: yes,   Order current and documentation up to date? Yes    Lines/Drains  LDA Insertion Date Discontinued Date Dressing Changes   PIV  x2     TLC  12/22 rt fem     Arterial       Byrnes  12/22     Vas Cath 12/26 LJ     ETT  12/22 #7.5     Surgical drains        Night Shift Hospitalist Interventions    Problem(Brief) Date Time Intervention Physician contacted   H 6.1 12-27 0700 transfuse adike                                        Drip rates at handoff:    sodium chloride      dextrose 10 mL/hr at 12/28/22 0501    prismaSATE BGK 4/2.5 1,000 mL/hr at 12/28/22 0056    prismaSATE BGK 4/2.5 500 mL/hr at 12/27/22 2039    prismaSATE BGK 4/2.5 500 mL/hr at 12/28/22 3155    fentaNYL 175 mcg/hr (12/28/22 0528)    dextrose      sodium chloride      dexmedetomidine 1 mcg/kg/hr (12/28/22 0501)    sodium chloride      norepinephrine 6 mcg/min (12/28/22 0501)       Hospital Course Daily Updates:  Admit Day# 4  12/26:. .................................................................................. Eduar Sanches     Admit Day #5 12/27 Day  -1unit PRBC given  -Started D10% infusion @ 10cc/hr, still NPO  -Full code to DNR-CCA  -Albumin 25g given  -still on CRRT w/ -185 fluid balance for 12hrs  -still no BM  - Vent changes made decreased TV and RR  Admit Day #6    Night shift- weaned levo to 4mcg. Tolerating CRRT well    Lab Data:   CBC:   Recent Labs     12/27/22  0535 12/27/22  1210 12/28/22  0521   WBC 17.9*  --  27.4*   HGB 6.1* 8.7* 7.7*   HCT 18.4* 26.2* 23.8*   MCV 75.6*  --  81.2   PLT 70*  --  47*       BMP:    Recent Labs     12/27/22  2345 12/28/22  0521    135*   K 3.6 4.0   CO2 27 26   BUN 10 8   CREATININE <0.5* <0.5*       LIVR:   Recent Labs     12/27/22  2345 12/28/22  0521   * 132*   * 187*       PT/INR:   Recent Labs     12/27/22  2345 12/28/22  0521   PROT 4.2* 4.3*       APTT: No results for input(s): APTT in the last 72 hours.   ABG:   Recent Labs     12/27/22  0934   PHART 7.666*   [id number] 22.6*   PO2ART 181.4*       Consults (if GI or Nephrology- which group?)-  [facility] nephrology, vascular

## 2022-12-28 NOTE — PROGRESS NOTES
12/28/22 0013   Patient Observation   Heart Rate 96   SpO2 98 %   Breath Sounds   Right Upper Lobe Clear   Right Middle Lobe Clear   Right Lower Lobe Diminished   Left Upper Lobe Clear   Left Lower Lobe Diminished   Vent Information   Vent Mode AC/PC   Ventilator Settings   Resp Rate (Set) 14 bmp   PEEP/CPAP (cmH2O) 5   FiO2  40 %   Pressure Support (cm H2O) 0 cm H2O   Insp Time (sec) 1.1 sec   Vent Patient Data (Readings)   Vt (Measured) 363 mL   Peak Inspiratory Pressure (cmH2O) 35 cmH2O   Rate Measured 14 br/min   Minute Volume (L/min) 5.28 Liters   Mean Airway Pressure (cmH2O) 13 cmH20   I:E Ratio 1:2.90   I Time/ I Time % 1.1 s   Vent Alarm Settings   High Pressure (cmH2O) 45 cmH2O   Low Minute Volume (lpm) 2 L/min   High Minute Volume (lpm) 20 L/min   Low Exhaled Vt (ml) 200 mL   RR High (bpm) 40 br/min   Apnea (secs) 20 secs   Additional Respiratoray Assessments   Humidification Source HME   Circuit Condensation Drained   Ambu Bag With Mask At Bedside Yes   Airway Clearance   Suction ET Tube   Subglottic Suction Done Yes   Suction Device Inline suction catheter   Sputum Method Obtained Endotracheal   Sputum Amount Small   Sputum Color/Odor Tan   Sputum Consistency Thick   ETT    Placement Date: 12/22/22   Present on Admission/Arrival: No  Placed By: Licensed provider  Placement Verified By: Auscultation;Colorimetric ETCO2 device; Chest X-ray  Preoxygenation: Yes  Mask Ventilation: Ventilated by mask (1)  Airway Type: Cuffed  A...    Secured At 22 cm   Measured From Lips   ETT Placement Center   Secured By Commercial tube wayne   Site Assessment Dry   Cuff Pressure 30 cm H2O

## 2022-12-28 NOTE — CONSULTS
Palliative Care Initial Note  Palliative Care Admit date:  12/28/22    ACP/palcare ref noted.   Family teleconf/mtg scheduled for 1500 today

## 2022-12-28 NOTE — PROGRESS NOTES
[facility]                                               Progress note    CC: AMS, septic shock  Summary:   Rosa Isela Cazares is being seen by nephrology for Acute kidney injury (MAYTE). She is a 49 y.o. female with aPMH signficiant for spina bifida, neurogenic bladder, hydrocephalus s/p  shunt who presented to the hospital 12/22/2022 with increasing somnolence. She was unarousable by the time she was in the ED and had a high grade fever. She was in septic shock with high pressor needs. Since admission she has received a large volume of fluids for resuscitation and she has now become increasingly edematous. Cr has doubled from < 0.5 to 1.0 and remains on pressors. .     Interval History  - seen at bedside  - started CRRT 12/26/2022  - remains on levo, dose de-escalating  -  mL, UF 4199 mL, net -ve ~ 2 L for yesterday  - better success with UF yesterday    Plan:   - continue UF with goal of 1.5-2.0 L net -ve per day  - reduce dialysate flows to reduce phos wasting  - trial of midodrine to facilitate weaning off pressors  - re-dose albumin 25 g      Priya Snyder MD  [facility]  Office: [phone]    Assessment:   MAYTE:  - 2/2 septic shock and resulting ATN  - baseline Cr < 0.5, indicative for sarcopenia  - Cr at time of consult was 1.0  - starting CRRT 12/26/2022 for volume management     Volume overload:  - grossly volume overloaded due to massive volume of fluids given for resucitation  - continues to requrie bicarb for acidosis management  - UO borderline  - address with CRRT     Septic shock  - pneumonia with purulence secretions seen on bronch  - irreversible ischemia of the LE due to pressors likely  - UTI with neurogenic bladder. ROS:   Review of systems is unobtainable due to current clinical state. PMH:   Past medical history, surgical history, social history, family history are reviewed and updated as appropriate.     Reviewed current medication list.   Allergies reviewed and updated as needed. PE:   Vitals:    12/28/22 1541   BP:    Pulse: 85   Resp:    Temp:    SpO2: 97%       General appearance: intubated. HEENT: no icterus, EOM intact, trachea midline. Neck : no masses, appears symmetrical and no JVD appreciated. Respiratory: ETT to vent bilateral equal chest rise. No wheeze, no crackles   Cardiovascular: Ausculation shows RRR and  4+ edema   Abdomen: abdomen is soft, non distended, no masses, no pain with palpation.   Musculoskeletal:  no joint swelling, no deformity, strength grossly normal.   Skin: no rashes, no induration, no tightening, no jaundice   Neuro:  AUDELIA      Lab Results   Component Value Date    CREATININE <0.5 (L) 12/28/2022    BUN 7 12/28/2022     12/28/2022    K 4.0 12/28/2022     12/28/2022    CO2 26 12/28/2022      Lab Results   Component Value Date    WBC 27.4 (H) 12/28/2022    HGB 7.7 (L) 12/28/2022    HCT 23.8 (L) 12/28/2022    MCV 81.2 12/28/2022    PLT 47 (L) 12/28/2022     Lab Results   Component Value Date    CALCIUM 7.6 (L) 12/28/2022    CAION 1.05 (L) 12/28/2022    PHOS 1.9 (L) 12/28/2022

## 2022-12-28 NOTE — PROGRESS NOTES
12/28/22 1541   Patient Observation   Heart Rate 85   SpO2 97 %   Breath Sounds   Right Upper Lobe Clear   Right Middle Lobe Diminished   Right Lower Lobe Diminished   Left Upper Lobe Clear   Left Lower Lobe Diminished   Vent Information   Equipment Changed HME   Vent Mode AC/PC   Ventilator Settings   Resp Rate (Set) 14 bmp   PEEP/CPAP (cmH2O) 5   FiO2  40 %   Pressure Support (cm H2O) 0 cm H2O   Insp Time (sec) 1.1 sec   Vent Patient Data (Readings)   Vt (Measured) 448 mL   Peak Inspiratory Pressure (cmH2O) 35 cmH2O   Rate Measured 14 br/min   Minute Volume (L/min) 6.29 Liters   Mean Airway Pressure (cmH2O) 13 cmH20   I:E Ratio 1:2.90   I Time/ I Time % 1.1 s   Vent Alarm Settings   High Pressure (cmH2O) 45 cmH2O   Low Minute Volume (lpm) 2 L/min   Low Exhaled Vt (ml) 200 mL   RR High (bpm) 40 br/min   Apnea (secs) 20 secs   Additional Respiratoray Assessments   Humidification Source HME   Ambu Bag With Mask At Bedside Yes   Airway Clearance   Suction ET Tube   Suction Device Inline suction catheter   Sputum Method Obtained Endotracheal   Sputum Amount Small   ETT    Placement Date: 12/22/22   Present on Admission/Arrival: No  Placed By: Licensed provider  Placement Verified By: Auscultation;Colorimetric ETCO2 device; Chest X-ray  Preoxygenation: Yes  Mask Ventilation: Ventilated by mask (1)  Airway Type: Cuffed  A...    Secured At 21 cm   Measured From Nare   ETT Placement Center   Secured By Commercial tube wayne

## 2022-12-28 NOTE — CARE COORDINATION
CM following. From home w/mother, hx of spinal bifida and MR. WC bound at baseline. Vent 40/5.  Palliative care meeting today 3pm.

## 2022-12-28 NOTE — PROGRESS NOTES
12/28/22 0729   Patient Observation   Heart Rate 79   SpO2 98 %   Vent Information   Vent Mode AC/PC   Ventilator Settings   Resp Rate (Set) 14 bmp   PEEP/CPAP (cmH2O) 5   FiO2  40 %   Pressure Support (cm H2O) 0 cm H2O   Pressure Ordered 30   Vent Patient Data (Readings)   Vt (Measured) 497 mL   Peak Inspiratory Pressure (cmH2O) 35 cmH2O   Rate Measured 14 br/min   Minute Volume (L/min) 6.93 Liters   Mean Airway Pressure (cmH2O) 13 cmH20   I:E Ratio 1:2.90   I Time/ I Time % 1.1 s   Vent Alarm Settings   High Pressure (cmH2O) 45 cmH2O   Low Minute Volume (lpm) 2 L/min   Low Exhaled Vt (ml) 200 mL   RR High (bpm) 40 br/min   Apnea (secs) 20 secs   Additional Respiratoray Assessments   Humidification Source HME   Ambu Bag With Mask At Bedside Yes   Airway Clearance   Suction ET Tube   Suction Device Inline suction catheter   Sputum Method Obtained Endotracheal   Sputum Amount Small   Sputum Color/Odor Tan   Sputum Consistency Thick; Thin   ETT    Placement Date: 12/22/22   Present on Admission/Arrival: No  Placed By: Licensed provider  Placement Verified By: Auscultation;Colorimetric ETCO2 device; Chest X-ray  Preoxygenation: Yes  Mask Ventilation: Ventilated by mask (1)  Airway Type: Cuffed  A...    Secured At 21 cm   Measured From Lips   ETT Placement Left   Secured By Commercial tube wayne   Cuff Pressure 30 cm H2O

## 2022-12-28 NOTE — PROGRESS NOTES
Shift: [id number]    Admitting diagnosis: Sepsis    Presentation to hospital: Mother called EMS when she found pt with AMS     Surgery: no     Nursing assessment at handoff  stable    Emergency Contact/POA:Mom Ava Johnson  Family updated: yes - Mom     Most recent vitals: BP 99/67   Pulse 77   Temp 98.3 °F (36.8 °C) (Bladder)   Resp 14   Ht 4' (1.219 m)   Wt 106 lb 4.2 oz (48.2 kg)   SpO2 96%   BMI 32.43 kg/m²      Rhythm: Normal Sinus Rhythm 70-80's   NC/HFNC- NA   Respiratory support: - Ventilator Settings:    Vt (Set, mL): 400 mL  Resp Rate (Set): 14 bmp  FiO2 : 40 %    PEEP/CPAP (cmH2O): 5       Vent days: Day 5    Increased O2 requirements: no    Admission weight Weight: 59 lb (26.8 kg)  Today's weight   Wt Readings from Last 1 Encounters:   12/26/22 106 lb 4.2 oz (48.2 kg)         UOP >30ml/hr: yes      Byrnes need assessed each shift: yes, pt has 4+ labial edema    Restraints: yes,   Order current and documentation up to date? Yes    Lines/Drains  LDA Insertion Date Discontinued Date Dressing Changes   PIV  x2     TLC  12/22 rt fem     Arterial       Byrnes  12/22     Vas Cath 12/26 LJ     ETT  12/22 #7.5     Surgical drains        Night Shift Hospitalist Interventions    Problem(Brief) Date Time Intervention Physician contacted   H 6.1 12-27 0700 transfuse adike                                        Drip rates at handoff:    sodium chloride      dextrose 10 mL/hr at 12/27/22 1802    prismaSATE BGK 4/2.5 1,000 mL/hr at 12/27/22 1518    prismaSATE BGK 4/2.5 500 mL/hr at 12/27/22 0941    prismaSATE BGK 4/2.5 500 mL/hr at 12/27/22 0941    fentaNYL 150 mcg/hr (12/27/22 1800)    dextrose      sodium chloride      dexmedetomidine 1 mcg/kg/hr (12/27/22 1800)    sodium chloride      norepinephrine 7 mcg/min (12/27/22 1800)       Hospital Course Daily Updates:  Admit Day# 4  12/26:. .................................................................................. Eduar Sanches     Admit Day #5 12/27 Day  [facility]  -Started D10% infusion @ 10cc/hr, still NPO  -Full code to DNR-CCA  -Albumin 25g given  -still on CRRT w/ -185 fluid balance for 12hrs  -still no BM    Lab Data:   CBC:   Recent Labs     12/26/22  2100 12/27/22  0535 12/27/22  1210   WBC 5.3 17.9*  --    HGB 7.2* 6.1* 8.7*   HCT 22.8* 18.4* 26.2*   MCV 76.9* 75.6*  --    PLT 69* 70*  --        BMP:    Recent Labs     12/27/22  1210 12/27/22  1735   * 134*   K 3.6 3.4*   CO2 23 24   BUN 18 13   CREATININE 0.6 <0.5*       LIVR:   Recent Labs     12/27/22  1210 12/27/22  1735   * 150*   * 201*       PT/INR:   Recent Labs     12/27/22  1210 12/27/22  1735   PROT 3.9* 4.2*       APTT: No results for input(s): APTT in the last 72 hours.   ABG:   Recent Labs     12/25/22  0426 12/27/22  0934   PHART 7.476* 7.666*   GZP3PSC 33.8* 22.6*   PO2ART 90.4 181.4*       Consults (if GI or Nephrology- which group?)-  [facility] nephrology, vascular

## 2022-12-28 NOTE — PROGRESS NOTES
12/27/22 1944   Patient Observation   Heart Rate 71   SpO2 96 %   Breath Sounds   Right Upper Lobe Clear   Right Middle Lobe Diminished   Right Lower Lobe Diminished   Left Upper Lobe Clear   Left Lower Lobe Diminished   Vent Information   Vent Mode AC/PC   Ventilator Settings   Resp Rate (Set) 14 bmp   PEEP/CPAP (cmH2O) 5   FiO2  40 %   Pressure Support (cm H2O) 0 cm H2O   Insp Time (sec) 1.1 sec   Vent Patient Data (Readings)   Vt (Measured) 414 mL   Peak Inspiratory Pressure (cmH2O) 36 cmH2O   Rate Measured 14 br/min   Minute Volume (L/min) 5.79 Liters   Mean Airway Pressure (cmH2O) 13 cmH20   I:E Ratio 1:2.90   I Time/ I Time % 1.1 s   Vent Alarm Settings   High Pressure (cmH2O) 45 cmH2O   Low Minute Volume (lpm) 2 L/min   High Minute Volume (lpm) 20 L/min   Low Exhaled Vt (ml) 200 mL   RR High (bpm) 40 br/min   Apnea (secs) 20 secs   Additional Respiratoray Assessments   Humidification Source HME   Ambu Bag With Mask At Bedside Yes   Airway Clearance   Suction ET Tube   Subglottic Suction Done Yes   Suction Device Inline suction catheter   Sputum Method Obtained Endotracheal   Sputum Amount Moderate   Sputum Color/Odor Tan; White;Clear   Sputum Consistency Thin;Thick   ETT    Placement Date: 12/22/22   Present on Admission/Arrival: No  Placed By: Licensed provider  Placement Verified By: Auscultation;Colorimetric ETCO2 device; Chest X-ray  Preoxygenation: Yes  Mask Ventilation: Ventilated by mask (1)  Airway Type: Cuffed  A...    Secured At 21 cm   Measured From Lips   ETT Placement Center   Secured By Commercial tube wayne   Site Assessment Dry   Cuff Pressure 30 cm H2O

## 2022-12-28 NOTE — PROGRESS NOTES
Hospitalist Progress Note      PCP: Latoya Tanner DO    Date of Admission: 12/22/2022    Chief Complaint: [address] Course:   49 y.o. female who presented to [facility] with AMS. Patient has a history of spinal bifida and mental retardation. She is wheel chair bound and her mother is her caregiver and decision maker. Per patient's mother, patient started seeming fatigued a few hours ago. This quickly progressed and patient started to seem somnolent. By the time she presented to the ED, patient was unarousable. She had a high fever and severe tachycardia. She regained consciousness once IVF were started. Patient has a neurogenic bladder and straight caths multiple times per day. She had evidence of UTI on a UA. Patient had good response to IVF but remains critically ill in ED. Subjective: intubated, sedated. Discussed with nurse. Going on drug holiday today. Mother at bedside with .        Medications:  Reviewed    Infusion Medications    sodium chloride      dextrose 20 mL/hr at 12/28/22 1000    prismaSATE BGK 4/2.5 1,000 mL/hr at 12/28/22 0612    prismaSATE BGK 4/2.5 500 mL/hr at 12/28/22 0612    prismaSATE BGK 4/2.5 500 mL/hr at 12/28/22 9767    fentaNYL 150 mcg/hr (12/28/22 1000)    dextrose      sodium chloride      dexmedetomidine 0.8 mcg/kg/hr (12/28/22 1000)    sodium chloride      norepinephrine 4 mcg/min (12/28/22 1000)     Scheduled Medications    mineral oil  1 enema Rectal Once    senna  1 tablet Per NG tube BID    cefTRIAXone (ROCEPHIN) IV  2,000 mg IntraVENous Q24H    docusate  100 mg Per NG tube BID    chlorhexidine  15 mL Mouth/Throat BID    carboxymethylcellulose PF  1 drop Both Eyes 6 times per day    collagenase   Topical Daily    methIMAzole  5 mg Oral Daily    sodium chloride flush  5-40 mL IntraVENous 2 times per day    pantoprazole  40 mg IntraVENous Daily     PRN Meds: Lip Balm, sodium chloride, potassium chloride, magnesium sulfate, calcium gluconate **OR** calcium gluconate **OR** calcium gluconate **OR** calcium gluconate, sodium phosphate IVPB **OR** sodium phosphate IVPB **OR** sodium phosphate IVPB **OR** sodium phosphate IVPB, glucose, dextrose bolus **OR** dextrose bolus, glucagon (rDNA), dextrose, sodium chloride flush, sodium chloride, acetaminophen **OR** acetaminophen, sodium chloride      Intake/Output Summary (Last 24 hours) at 12/28/2022 1135  Last data filed at 12/28/2022 1000  Gross per 24 hour   Intake 2445.88 ml   Output 4979 ml   Net -2533.12 ml         Physical Exam Performed:    /66   Pulse 76   Temp 98.5 °F (36.9 °C) (Bladder)   Resp 14   Ht 4' (1.219 m)   Wt 106 lb 4.2 oz (48.2 kg)   SpO2 98%   BMI 32.43 kg/m²     General appearance:  intubated, sedated  HEENT:  Normal cephalic, atraumatic without obvious deformity. Pupils equal, round, and reactive to light. Extra ocular muscles intact. Conjunctivae/corneas clear. Neck: Supple, with full range of motion. No jugular venous distention. Trachea midline. Respiratory:  Normal respiratory effort. Clear to auscultation, bilaterally without Rales/Wheezes/Rhonchi. Cardiovascular:  RRR with normal S1/S2 without murmurs, rubs or gallops. Abdomen: Soft, non-tender, non-distended with normal bowel sounds. Musculoskeletal:  Full range of motion without deformity. Chronic LE contractures, poor tone. Feet cyanotic with diffuse mottling.  BUE dusky   Neurologic: Intubated and sedataed        Labs:   Recent Labs     12/26/22  2100 12/27/22  0535 12/27/22  1210 12/28/22  0521   WBC 5.3 17.9*  --  27.4*   HGB 7.2* 6.1* 8.7* 7.7*   HCT 22.8* 18.4* 26.2* 23.8*   PLT 69* 70*  --  47*       Recent Labs     12/27/22  1735 12/27/22  2345 12/28/22  0521   * 136 135*   K 3.4* 3.6 4.0    102 101   CO2 24 27 26   BUN 13 10 8   CREATININE <0.5* <0.5* <0.5*   CALCIUM 7.4* 7.6* 7.7*   PHOS 2.1* 2.1* 2.1*       Recent Labs     12/26/22  0455 12/26/22  2100 12/27/22  1735 12/27/22  6007 12/28/22  0521   *   < > 150* 136* 132*   *   < > 201* 202* 187*   BILIDIR <0.2  --   --   --   --    BILITOT 0.4   < > 0.8 1.1* 1.3*   ALKPHOS 214*   < > 171* 181* 186*    < > = values in this interval not displayed. No results for input(s): INR in the last 72 hours. No results for input(s): [place] in the last 72 hours. Urinalysis:      Lab Results   Component Value Date/Time    NITRU Negative 12/22/2022 03:05 PM    WBCUA >100 12/22/2022 03:05 PM    BACTERIA 4+ 12/22/2022 03:05 PM    RBCUA None seen 12/22/2022 03:05 PM    BLOODU LARGE 12/22/2022 03:05 PM    SPECGRAV >=1.030 12/22/2022 03:05 PM    GLUCOSEU Negative 12/22/2022 03:05 PM       Radiology:  XR ABDOMEN (KUB) (SINGLE AP VIEW)   Final Result   Limited abdominal radiograph due to positioning. Dense stool in the colon   suggesting a pattern of ileus or constipation. XR CHEST PORTABLE   Final Result   Increasing multifocal ground-glass airspace opacities. ARDS or pulmonary   edema favored. Developing pneumonitis cannot be excluded. XR CHEST PORTABLE   Final Result   Central venous catheter is in the mid SVC      The endotracheal tube has been retracted. It is slightly beyond the thoracic   inlet. XR ABDOMEN (KUB) (SINGLE AP VIEW)   Final Result   Limited study. Distended loops of bowel are noted. This has worsened. Findings could be related to ileus versus bowel obstruction. XR CHEST PORTABLE   Final Result   1. Right base opacity with small pleural effusion. If patient has clinical   symptoms of infection, differential would include pneumonia. In the absence   of infectious symptoms, atelectasis can have the same appearance. .   2. ETT tip at the level of the susanne. CT ABDOMEN PELVIS WO CONTRAST Additional Contrast? None   Final Result   1.  Technically limited CT due to the patient's underlying medical condition   and artifact from positioning, hardware and lack of IV contrast.   2. Probable sacral decubitus ulcer for which correlation with physical exam   is advised. 3. Questionable inflammation at the left hip joint which may simply be due to   the patient's underlying medical condition and stasis. Osteoarthritis or   septic pattern of arthritis cannot be definitively excluded. 4. Heterogeneity of the right kidney that is difficult to evaluate. An   underlying pattern of inflammation/pyelonephritis may be considered in the   appropriate clinical setting. No obvious sign of obstructive uropathy. 5. Bibasilar pleural fluid and airspace changes. Correlate with pulmonary   symptoms. XR ABDOMEN (KUB) (SINGLE AP VIEW)   Final Result   Enteric tube is in the stomach         XR CHEST PORTABLE   Final Result   Endotracheal tube is at the susanne         XR CHEST PORTABLE   Final Result   No radiographic evidence of acute pulmonary disease.              IP CONSULT TO HOSPITALIST  IP CONSULT TO CRITICAL CARE  IP CONSULT TO CRITICAL CARE  IP CONSULT TO VASCULAR SURGERY  IP CONSULT TO NEPHROLOGY  IP CONSULT TO PALLIATIVE CARE    Assessment/Plan:    Active Hospital Problems    Diagnosis     Ileus ([address] [K56.7]      Priority: Medium    Dialysis patient [facility]) [Z99.2]      Priority: Medium    Septic shock ([address] [A41.9, R65.21]      Priority: Medium    Thrombocytopenia ([address] [D69.6]      Priority: Medium    Bacteremia due to Escherichia coli [R78.81, B96.20]      Priority: Medium    Hypoglycemia [E16.2]      Priority: Medium    Shock liver [K72.00]      Priority: Medium    Pulmonary infiltrates [R91.8]      Priority: Medium    Severe sepsis ([address] [A41.9, R65.20]      Priority: Medium    Other secondary scoliosis, 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 Lactate 10.2 on admission  - continue IVF  - blood, urine cultures positive of E coli  - suspect pyelonephritis based on CT abd/pel and culture results  - wean pressors as able  - continue merrem    Possible pneumonia   Bronch with mucopurulent plugs  Cultures with candida  Continue merrem     Acute hypoxic respiratory failure  - 2/2 above  - pulmonology consulted  - intubated 12/22  - vent management per pulm  Bronch 12/26 - thick mucous plugs      Acute metabolic encephalopathy  - 2/2 above  - supportive care     Lactic acidosis  - severe, 2/2 sepsis  - continue bicarb gtt  - vascular surgery consulted for severe mottling  - no surgical intervention warranted  - continue to monitor    MAYTE  - 2/2 shock  - nephrology consulted  - Cr trending up consistently but slowly likely related to extremely small mass  - continues with oliguria  Significant positive fluid balance. Nephro recommending starting CRRT for volume optimization and lactate clearance. Limb ischemia  - due to shock and pressor use  - vascular surgery consulted  - no plan for surgical intervention   Irreversible BLE ischemia   If patient survives will require B AKAs     Shock liver - Continue to follow LFTs, downtrending today. Hyperthyroidism  - TSH WNL  - continue home methimazole     Anemia  Thrombocytopenia  - Heparin was DC  HIT ordered   - chronic, stable  - holding home Fe supplement  - continue to monitor  Hgb 6.1 today, transfuse pRBC  Transfuse for Hgb < 7     HTN  - hypotension on admission  - holding metoprolol     Spina Bifida  - paraplegic at baseline  - supportive care    Discussed with mother.  Ongoing discussions on goal of care     DVT Prophylaxis: SCDs due to thrombocytopenia, HIT panel pending   Diet: Diet NPO  Code Status: DNR-CCA  PT/OT Eval Status: ordered    Dispo - ICU    Appropriate for A1 Discharge Unit: June Aquino DO

## 2022-12-28 NOTE — PROGRESS NOTES
Shift: 2930-3331    Admitting diagnosis: Sepsis    Presentation to hospital: Mother called EMS when she found pt with AMS     Surgery: no     Nursing assessment at handoff  stable    Emergency Contact/POA:Mom Arielle Chapa  Family updated: yes - Mom     Most recent vitals: BP 96/77   Pulse 81   Temp 98.2 °F (36.8 °C) (Bladder)   Resp 14   Ht 4' (1.219 m)   Wt 106 lb 4.2 oz (48.2 kg)   SpO2 97%   BMI 32.43 kg/m²      Rhythm: Normal Sinus Rhythm 70-80's   NC/HFNC- NA   Respiratory support: - Ventilator Settings:    Vt (Set, mL): 400 mL  Resp Rate (Set): 14 bmp  FiO2 : 40 %    PEEP/CPAP (cmH2O): 5       Vent days: Day 6    Increased O2 requirements: no    Admission weight Weight: 59 lb (26.8 kg)  Today's weight   Wt Readings from Last 1 Encounters:   12/26/22 106 lb 4.2 oz (48.2 kg)         UOP >30ml/hr: no    Byrnes need assessed each shift: yes, pt has 4+ labial edema and on CRRT    Restraints: yes,   Order current and documentation up to date? Yes    Lines/Drains  LDA Insertion Date Discontinued Date Dressing Changes   PIV       TLC  12/22 rt fem     Arterial       Byrnes  12/22     Vas Cath 12/26 LJ     ETT  12/22 #7.5     Surgical drains        Night Shift Hospitalist Interventions    Problem(Brief) Date Time Intervention Physician contacted   H 6.1 12-27 0700 transfuse adike                                        Drip rates at handoff:    sodium chloride      dextrose 20 mL/hr at 12/28/22 1800    prismaSATE BGK 4/2.5 750 mL/hr at 12/28/22 1619    prismaSATE BGK 4/2.5 500 mL/hr at 12/28/22 1619    prismaSATE BGK 4/2.5 500 mL/hr at 12/28/22 1619    fentaNYL 175 mcg/hr (12/28/22 1800)    dextrose      sodium chloride      dexmedetomidine 1 mcg/kg/hr (12/28/22 1800)    sodium chloride      norepinephrine 4 mcg/min (12/28/22 1800)       Hospital Course Daily Updates:  Admit Day# 4  12/26:. .................................................................................. Khalida Valenzuela     Admit Day #5 12/27 Day  -1unit [facility] given  -Started D10% infusion @ 10cc/hr, still NPO  -Full code to DNR-CCA  -Albumin 25g given  -still on CRRT w/ -185 fluid balance for 12hrs  -still no BM  - Vent changes made decreased TV and RR  Night shift- weaned levo to 4mcg. Tolerating CRRT well    Admit Day #6 (12/28)  - Enema x 2 given  - meeting with palliative care to discuss goals of care  - removing fluid with CRRT  - sedation holiday  - blood cultures sent      Lab Data:   CBC:   Recent Labs     12/27/22  0535 12/27/22  1210 12/28/22  0521   WBC 17.9*  --  27.4*   HGB 6.1* 8.7* 7.7*   HCT 18.4* 26.2* 23.8*   MCV 75.6*  --  81.2   PLT 70*  --  47*       BMP:    Recent Labs     12/28/22  0521 12/28/22  1228   * 137   K 4.0 4.0   CO2 26 26   BUN 8 7   CREATININE <0.5* <0.5*       LIVR:   Recent Labs     12/28/22  0521 12/28/22  1228   * 116*   * 167*       PT/INR:   Recent Labs     12/28/22  0521 12/28/22  1228   PROT 4.3* 4.1*       APTT: No results for input(s): APTT in the last 72 hours.   ABG:   Recent Labs     12/27/22  0934 12/28/22  0750   PHART 7.666* 7.551*   RKU2YOW 22.6* 30.2*   PO2ART 181.4* 133.7*       Consults (if GI or Nephrology- which group?)-  [facility] neph[facility], vascular

## 2022-12-28 NOTE — PROGRESS NOTES
12/28/22 1133   Patient Observation   Heart Rate 80   SpO2 95 %   Breath Sounds   Right Upper Lobe Clear   Right Middle Lobe Diminished   Right Lower Lobe Diminished   Left Upper Lobe Clear   Left Lower Lobe Diminished   Vent Information   Vent Mode AC/PC   Ventilator Settings   Resp Rate (Set) 14 bmp   PEEP/CPAP (cmH2O) 5   FiO2  40 %   Pressure Support (cm H2O) 0 cm H2O   Insp Time (sec) 1.1 sec   Pressure Ordered 30   Vent Patient Data (Readings)   Vt (Measured) 361 mL   Peak Inspiratory Pressure (cmH2O) 35 cmH2O   Rate Measured 14 br/min   Minute Volume (L/min) 4.97 Liters   Mean Airway Pressure (cmH2O) 13 cmH20   I:E Ratio 1:2.90   I Time/ I Time % 1.1 s   Vent Alarm Settings   High Pressure (cmH2O) 45 cmH2O   Low Minute Volume (lpm) 2 L/min   Low Exhaled Vt (ml) 200 mL   RR High (bpm) 40 br/min   Apnea (secs) 20 secs   Airway Clearance   Suction ET Tube   Suction Device Inline suction catheter   Sputum Method Obtained Endotracheal   Sputum Amount Small   Sputum Color/Odor Tan   Sputum Consistency Thick   ETT    Placement Date: 12/22/22   Present on Admission/Arrival: No  Placed By: Licensed provider  Placement Verified By: Auscultation;Colorimetric ETCO2 device; Chest X-ray  Preoxygenation: Yes  Mask Ventilation: Ventilated by mask (1)  Airway Type: Cuffed  A...    Secured At 21 cm   Measured From Lips   ETT Placement Center   Secured By Commercial tube wayne

## 2022-12-28 NOTE — PROGRESS NOTES
Pulmonary & Critical Care Medicine ICU Progress Note      Events of Last 24 hours:   Pt intubated and sedated. She still has not had a BM. Invasive Lines: PICC D#none          CVC D#5                     Art Line D#none           Vitals:  /70   Pulse 84   Temp 98.4 °F (36.9 °C) (Bladder)   Resp 14   Ht 4' (1.219 m)   Wt 106 lb 4.2 oz (48.2 kg)   SpO2 98%   BMI 32.43 kg/m²    Tmax:  CVP:        Intake/Output Summary (Last 24 hours) at 12/28/2022 9822  Last data filed at 12/28/2022 0701  Gross per 24 hour   Intake 2844.74 ml   Output 4704 ml   Net -1859.26 ml       EXAM:  Physical Exam  Constitutional:       Comments: Intubated and sedated    HENT:      Head: Normocephalic and atraumatic. Nose: Nose normal.   Eyes:      General: No scleral icterus. Right eye: No discharge. Left eye: No discharge. Cardiovascular:      Rate and Rhythm: Normal rate. Heart sounds: No murmur heard. No gallop. Pulmonary:      Effort: Pulmonary effort is normal. No respiratory distress. Breath sounds: No wheezing or rales. Abdominal:      General: There is distension. Comments: Abdomen rigid   Musculoskeletal:         General: Swelling (trace) present. Skin:     Comments: Lower ext mottled, cold.  Peripheral upper extremities appear dusky   Neurological:      Comments: Intubated and sedated        Medications:  Scheduled Meds:   senna  1 tablet Per NG tube BID    cefTRIAXone (ROCEPHIN) IV  2,000 mg IntraVENous Q24H    docusate  100 mg Per NG tube BID    chlorhexidine  15 mL Mouth/Throat BID    carboxymethylcellulose PF  1 drop Both Eyes 6 times per day    collagenase   Topical Daily    methIMAzole  5 mg Oral Daily    sodium chloride flush  5-40 mL IntraVENous 2 times per day    pantoprazole  40 mg IntraVENous Daily       PRN Meds:  Lip Balm, sodium chloride, potassium chloride, magnesium sulfate, calcium gluconate **OR** calcium gluconate **OR** calcium gluconate **OR** calcium gluconate, sodium phosphate IVPB **OR** sodium phosphate IVPB **OR** sodium phosphate IVPB **OR** sodium phosphate IVPB, glucose, dextrose bolus **OR** dextrose bolus, glucagon (rDNA), dextrose, sodium chloride flush, sodium chloride, acetaminophen **OR** acetaminophen, sodium chloride    Results:  CBC:   Recent Labs     12/26/22  2100 12/27/22  0535 12/27/22  1210 12/28/22  0521   WBC 5.3 17.9*  --  27.4*   HGB 7.2* 6.1* 8.7* 7.7*   HCT 22.8* 18.4* 26.2* 23.8*   MCV 76.9* 75.6*  --  81.2   PLT 69* 70*  --  47*     BMP:   Recent Labs     12/27/22  1735 12/27/22  2345 12/28/22  0521   * 136 135*   K 3.4* 3.6 4.0    102 101   CO2 24 27 26   PHOS 2.1* 2.1* 2.1*   BUN 13 10 8   CREATININE <0.5* <0.5* <0.5*     LIVER PROFILE:   Recent Labs     12/26/22  0455 12/26/22  2100 12/27/22  1735 12/27/22  2345 12/28/22  0521   *   < > 150* 136* 132*   *   < > 201* 202* 187*   BILIDIR <0.2  --   --   --   --    BILITOT 0.4   < > 0.8 1.1* 1.3*   ALKPHOS 214*   < > 171* 181* 186*    < > = values in this interval not displayed. PT/INR: No results for input(s): PROTIME, INR in the last 72 hours. APTT: No results for input(s): APTT in the last 72 hours. UA:No results for input(s): NITRITE, COLORU, PHUR, LABCAST, WBCUA, RBCUA, MUCUS, TRICHOMONAS, YEAST, BACTERIA, CLARITYU, SPECGRAV, LEUKOCYTESUR, UROBILINOGEN, BILIRUBINUR, BLOODU, GLUCOSEU, AMORPHOUS in the last 72 hours. Invalid input(s): Desmond Ayala    Cultures:  Culture, Blood 2 [[id number]] Collected: 12/23/22 0300   Order Status: Completed Specimen: Blood Updated: 12/27/22 0415     Culture, Blood 2 No Growth after 4 days of incubation. Narrative:     ORDER#: [id number]                          ORDERED BY: Jasbir Dia   SOURCE: Blood                              COLLECTED:  12/23/22 03:00   ANTIBIOTICS AT MICHAEL.:                      RECEIVED :  12/23/22 07:40   If child <=2 yrs old please draw pediatric bottle. ~Blood Culture #2 Culture, Blood 1 [[id number]] (Abnormal)  Collected: 12/22/22 1600   Order Status: Completed Specimen: Blood Updated: 12/25/22 0640     Organism Escherichia coli DNA Detected Abnormal      Blood Culture, Routine See additional report for complete BCID panel. Organism Escherichia coli Abnormal      Blood Culture, Routine --     POSITIVE for   Isolated one of two sets    Narrative:     ORDER#: [id number]                          ORDERED BY: Shreyas Childress   SOURCE: Blood No site given                COLLECTED:  12/22/22 16:00   ANTIBIOTICS AT MICHAEL.:                      RECEIVED :  12/23/22 04:03   CALL  [facility]  [facility] tel. [phone],   Previous panic on this admission - call not needed per SOP, 12/23/2022 15:46,   by [address] [place] [address]   Microbiology results called to and read back by Danelle licea,   12/23/2022 11:38, by [facility]   Microbiology results called to and read back by justice isabel,   12/23/2022 11:33, by [facility]   If child <=2 yrs old please draw pediatric bottle. ~Blood Culture 1   Culture, Urine [[id number]] (Abnormal) Collected: 12/23/22 0850   Order Status: Completed Specimen: Urine, clean catch Updated: 12/24/22 0721     Organism Gram negative geovanny Abnormal      Urine Culture, Routine --     <10,000 CFU/ml   No further workup    Narrative:     ORDER#: [id number]                          ORDERED BY: Andrea Le   SOURCE: Urine Clean Catch                  COLLECTED:  12/23/22 08:50   ANTIBIOTICS AT MICHAEL.:                      RECEIVED :  12/23/22 09:40         Films:  12/26/22  Impression   Central venous catheter is in the mid SVC       The endotracheal tube has been retracted. It is slightly beyond the thoracic   inlet.                  Assessment:  Septic shock  Lactic acidosis  Acute hypoxic respiratory failure  E.coli bacteremia  Ileus   UTI  Spina bifida  Microcytic anemia  Thrombocytopenia  Lower extremity ischemia  Shock liver  Hypoglycemia  Hyperthyroidism        Plan:  - Cont fentanyl and Precedex, patient is responding to voice on vent, will give drug holiday today  - cont Levo 4mcg with goal MAP > 65 or SBP > 90, will check lactic in afternoon  - cont full vent support, will try on SBT today  - CXR showing worsening b/l opacities - cont fluid removal with CRRT  - Vent bundle  - NGT to suction currently with ileus, will give mineral oil enema today and start on feeds at low rate. Abdominal XR showing still ileus and likely constipation  - Protonix for GI ppx  - Monitor LFT's, likely from hypoperfusion, slowly improving  - cont CRRT, cont sullivan, renal panel and Mg2+ q6h.   - Narrow abx to Rocephin for E.coli bacteremia for full 10 day course  - Repeat blood cultures to see if clearance of e.coli bacteremia  - Plt still decreasing likely from septic shock or CRRT  - SCD's, hold chemical ppx  - Increased D10 gtt for hypoglycemia  - cont home Methimazole  - Vascular following for limb ischemia - will require b/l AKA in future when out of critical illness   - Palliative care consulted for [address] conversation     Ppx/Sullivan/Lines  - R fem  - L IJ vasc cath  - SCD's, Protonix    Critical care time spent reviewing labs/films, examining patient, collaborating with other physicians but excluding procedures for life threatening organ failure is 39 minutes.         Electronically signed by:  Doe Gotti MD    12/28/2022    7:21 AM.

## 2022-12-28 NOTE — PROGRESS NOTES
12/28/22 0356   Breath Sounds   Right Upper Lobe Clear   Right Middle Lobe Clear   Right Lower Lobe Diminished   Left Upper Lobe Clear   Left Lower Lobe Diminished   Vent Information   Vent Mode AC/PC   Ventilator Settings   Insp Time (sec) 1.1 sec   Vent Patient Data (Readings)   I Time/ I Time % 1.1 s   Vent Alarm Settings   High Minute Volume (lpm) 20 L/min   Low Exhaled Vt (ml) 200 mL   Apnea (secs) 20 secs   Additional Respiratoray Assessments   Humidification Source HME   Ambu Bag With Mask At Bedside Yes   Airway Clearance   Suction ET Tube   Subglottic Suction Done Yes   Suction Device Inline suction catheter   Sputum Method Obtained Endotracheal   Sputum Amount Small   Sputum Color/Odor Clear   Sputum Consistency Thin   ETT    Placement Date: 12/22/22   Present on Admission/Arrival: No  Placed By: Licensed provider  Placement Verified By: Auscultation;Colorimetric ETCO2 device; Chest X-ray  Preoxygenation: Yes  Mask Ventilation: Ventilated by mask (1)  Airway Type: Cuffed  A...    Secured At 22 cm   Measured From Lips   ETT Placement Right   Secured By Commercial tube wayne   Site Assessment Dry   Cuff Pressure 35 cm H2O

## 2022-12-28 NOTE — PROGRESS NOTES
[facility] Wound Ostomy Continence Nurse  Follow-up Progress Note       NAME:  Diana Guzman  MEDICAL RECORD NUMBER:  [id number]  AGE:  49 y.o. GENDER:  female  :  1975  TODAY'S DATE:  2022    Subjective:intubated, mother in room. Wound Identification:stage 3 sacrum and stage 4 coccyx  Wound Type: pressure  Contributing Factors: chronic pressure and decreased mobility               Patient Goal of Care:  [x] Wound Healing  [] Odor Control  [] Palliative Care  [x] Pain Control   [] Other:     Objective:intubated, repositioned with pillows    /66   Pulse 80   Temp 98.5 °F (36.9 °C) (Bladder)   Resp 14   Ht 4' (1.219 m)   Wt 106 lb 4.2 oz (48.2 kg)   SpO2 95%   BMI 32.43 kg/m²   Giles Risk Score: Giles Scale Score: 12  Assessment: top sacrum red slough. Near rectum red moist slough   Measurements:  Wound 22 Sacrum Mid (Active)   Wound Image   22 1137   Wound Etiology Pressure Stage 4 22 1137   Dressing Status New dressing applied 22 1137   Wound Cleansed Not Cleansed 22 0800   Dressing/Treatment Pharmaceutical agent (see MAR); Gauze dressing/dressing sponge;Tape/Soft cloth adhesive tape 22 1137   Offloading for Diabetic Foot Ulcers Offloading ordered 22 1137   Dressing Change Due 22 1137   Wound Length (cm) 1 cm 22 1137   Wound Width (cm) 1 cm 22 1137   Wound Depth (cm) 0.2 cm 22 1137   Wound Surface Area (cm^2) 1 cm^2 22 1137   Change in Wound Size % (l*w) 0 22 1137   Wound Volume (cm^3) 0.2 cm^3 22 1137   Wound Healing % 0 22 1137   Distance Tunneling (cm) 0 cm 22 1137   Tunneling Position ___ O'Clock 0 22 1137   Undermining Starts ___ O'Clock 0 22 1137   Undermining Ends___ O'Clock 0 22 113   Undermining Maxium Distance (cm) 0 22 113   Wound Assessment Pink/red;Purple/maroon;Erythema 22   Drainage Amount None 22 Odor None 12/28/22 1137   Corine-wound Assessment Fragile 12/28/22 1137   Margins Unattached edges 12/28/22 1137   Wound Thickness Description not for Pressure Injury Full thickness 12/28/22 1137   Number of days: 5         Wound 12/22/22 Sacrum Stage 2 (Active)   Wound Image   12/28/22 1137   Wound Etiology Pressure Stage 3 12/28/22 1137   Dressing Status New dressing applied 12/28/22 1137   Wound Cleansed Wound cleanser 12/28/22 1137   Dressing/Treatment Pharmaceutical agent (see MAR); Gauze dressing/dressing sponge;Tape/Soft cloth adhesive tape 12/28/22 1137   Offloading for Diabetic Foot Ulcers Offloading ordered 12/28/22 1137   Dressing Change Due 12/29/22 12/28/22 1137   Wound Length (cm) 2 cm 12/28/22 1137   Wound Width (cm) 2 cm 12/28/22 1137   Wound Depth (cm) 0.2 cm 12/28/22 1137   Wound Surface Area (cm^2) 4 cm^2 12/28/22 1137   Change in Wound Size % (l*w) 20 12/28/22 1137   Wound Volume (cm^3) 0.8 cm^3 12/28/22 1137   Wound Healing % 20 12/28/22 1137   Distance Tunneling (cm) 0 cm 12/28/22 1137   Tunneling Position ___ O'Clock 0 12/28/22 1137   Undermining Starts ___ O'Clock 0 12/28/22 1137   Undermining Ends___ O'Clock 0 12/28/22 1137   Undermining Maxium Distance (cm) 0 12/28/22 1137   Wound Assessment Eschar moist;Pink/red;Purple/maroon 12/28/22 1137   Drainage Amount None 12/28/22 1137   Odor None 12/28/22 1137   Corine-wound Assessment Fragile;Edematous 12/28/22 1137   Margins Unattached edges 12/28/22 1137   Wound Thickness Description not for Pressure Injury Full thickness 12/28/22 1137   Number of days: 5         Wound 12/22/22 Toe (Comment  which one) Anterior;Right (Active)   Dressing Status Other (Comment) 12/27/22 2200   Wound Cleansed Not Cleansed 12/28/22 0800   Dressing/Treatment Open to air 12/28/22 0800   Wound Assessment Dry;Dusky; Purple/maroon 12/28/22 0800   Drainage Amount None 12/28/22 0800   Number of days: 5     Incision 09/12/22 Mouth (Active)   Number of days: 107       Response to treatment:  Well tolerated by patient. Pain Assessment:  Severity:  0 / 10  Quality of pain: N/A  Wound Pain Timing/Severity: none  Premedicated: No  Plan:   Plan of Care: Wound 12/22/22 Toe (Comment  which one) Anterior;Right-Dressing/Treatment: Open to air  Wound 12/22/22 Sacrum Mid-Dressing/Treatment: Pharmaceutical agent (see MAR), Gauze dressing/dressing sponge, Tape/Soft cloth adhesive tape (santyl)  Wound 12/22/22 Sacrum Stage 2-Dressing/Treatment: Pharmaceutical agent (see MAR), Gauze dressing/dressing sponge, Tape/Soft cloth adhesive tape     Will continue below treatment for now. Recommend  Daily cleanse with bathwipe or normal saline, pat dry the sacrum and low coccyx area. Apply Santyl nickel thick, cover with dry dressing and medipore tape or tegaderm.     Specialty Bed Required : Yes   [x] Low Air Loss   [x] Pressure Redistribution  [] Fluid Immersion  [] Bariatric  [] Total Pressure Relief  [] Other:     Current Diet: Diet NPO  Dietician consult:  Yes    Discharge Plan:  Placement for patient upon discharge: skilled nursing   Patient appropriate for Outpatient [address]: Yes    Referrals:  [x]  following  [] [address] [employer] [facility]  [] Supplies  [] Other    Patient/Caregiver Teaching:  Level of patient/caregiver understanding able to:   [] Indicates understanding       [] Needs reinforcement  [] Unsuccessful      [] Verbal Understanding  [] Demonstrated understanding       [] No evidence of learning  [] Refused teaching         [x] N/A       Electronically signed by Lisseth Vargas RN, on 12/28/2022 at 11:45 AM

## 2022-12-29 NOTE — PROGRESS NOTES
12/29/22 0027   Patient Observation   Heart Rate (!) 49   SpO2 93 %   Vent Information   Vent Mode AC/PC   Ventilator Settings   Resp Rate (Set) 14 bmp   PEEP/CPAP (cmH2O) 5   FiO2  40 %   Pressure Support (cm H2O) 0 cm H2O   Vent Patient Data (Readings)   Vt (Measured) 423 mL   Peak Inspiratory Pressure (cmH2O) 36 cmH2O   Rate Measured 14 br/min   Minute Volume (L/min) 6.25 Liters   Mean Airway Pressure (cmH2O) 13 cmH20   I:E Ratio 1:2.90   Flow Sensitivity 3 L/min   I Time/ I Time % 1.1 s   Backup Apnea On   Vent Alarm Settings   High Pressure (cmH2O) 45 cmH2O   Low Minute Volume (lpm) 2 L/min   Low Exhaled Vt (ml) 200 mL   RR High (bpm) 40 br/min   Apnea (secs) 20 secs   Additional Respiratoray Assessments   Humidification Source HME   Ambu Bag With Mask At Bedside Yes   Airway Clearance   Suction ET Tube   Suction Device Inline suction catheter   Sputum Method Obtained Endotracheal   Sputum Amount Small   Sputum Color/Odor Tan   Sputum Consistency Thick   ETT    Placement Date: 12/22/22   Present on Admission/Arrival: No  Placed By: Licensed provider  Placement Verified By: Auscultation;Colorimetric ETCO2 device; Chest X-ray  Preoxygenation: Yes  Mask Ventilation: Ventilated by mask (1)  Airway Type: Cuffed  A...    Secured At 21 cm   Measured From Lips   ETT Placement Left   Secured By Commercial tube wayne   Site Assessment Dry   Cuff Pressure 30 cm H2O   Skin Assessment   Skin Assessment Clean, dry, & intact

## 2022-12-29 NOTE — PROGRESS NOTES
12/29/22 0816   Patient Observation   Heart Rate 72   SpO2 100 %   Breath Sounds   Right Upper Lobe Clear   Right Middle Lobe Diminished   Right Lower Lobe Diminished   Left Upper Lobe Clear   Left Lower Lobe Diminished   Vent Information   Vent Mode AC/PC   Ventilator Settings   Resp Rate (Set) 14 bmp   PEEP/CPAP (cmH2O) 5   FiO2  40 %   Pressure Support (cm H2O) 0 cm H2O   Insp Time (sec) 1.1 sec   Pressure Ordered 30   Vent Patient Data (Readings)   Vt (Measured) 478 mL   Peak Inspiratory Pressure (cmH2O) 36 cmH2O   Rate Measured 14 br/min   Minute Volume (L/min) 6.64 Liters   Mean Airway Pressure (cmH2O) 13 cmH20   I:E Ratio 1:2.90   I Time/ I Time % 1.1 s   Vent Alarm Settings   High Pressure (cmH2O) 45 cmH2O   Low Minute Volume (lpm) 2 L/min   Low Exhaled Vt (ml) 200 mL   RR High (bpm) 40 br/min   Apnea (secs) 20 secs   Additional Respiratoray Assessments   Humidification Source HME   Ambu Bag With Mask At Bedside Yes   Airway Clearance   Suction ET Tube   Suction Device Inline suction catheter   Sputum Method Obtained Endotracheal   Sputum Amount Small   Sputum Color/Odor Tan   Sputum Consistency Thick   ETT    Placement Date: 12/22/22   Present on Admission/Arrival: No  Placed By: Licensed provider  Placement Verified By: Auscultation;Colorimetric ETCO2 device; Chest X-ray  Preoxygenation: Yes  Mask Ventilation: Ventilated by mask (1)  Airway Type: Cuffed  A...    Secured At 21 cm   Measured From Lips   ETT Placement Center   Secured By Commercial tube wayne   Cuff Pressure 30 cm H2O

## 2022-12-29 NOTE — PROGRESS NOTES
12/28/22 2051   Patient Observation   Heart Rate 73   SpO2 97 %   Vent Information   Vent Mode AC/PC   Ventilator Settings   Resp Rate (Set) 14 bmp   PEEP/CPAP (cmH2O) 5   FiO2  40 %   Pressure Support (cm H2O) 0 cm H2O   Vent Patient Data (Readings)   Vt (Measured) 470 mL   Peak Inspiratory Pressure (cmH2O) 36 cmH2O   Rate Measured 14 br/min   Minute Volume (L/min) 6.59 Liters   Mean Airway Pressure (cmH2O) 13 cmH20   I:E Ratio 1:2.90   Flow Sensitivity 3 L/min   I Time/ I Time % 1.1 s   Backup Apnea On   Vent Alarm Settings   High Pressure (cmH2O) 45 cmH2O   Low Minute Volume (lpm) 2 L/min   Low Exhaled Vt (ml) 200 mL   RR High (bpm) 40 br/min   Apnea (secs) 20 secs   Additional Respiratoray Assessments   Humidification Source HME   Ambu Bag With Mask At Bedside Yes   Airway Clearance   Suction ET Tube   Suction Device Inline suction catheter   Sputum Method Obtained Endotracheal   Sputum Amount Moderate   Sputum Color/Odor Tan   Sputum Consistency Thick   ETT    Placement Date: 12/22/22   Present on Admission/Arrival: No  Placed By: Licensed provider  Placement Verified By: Auscultation;Colorimetric ETCO2 device; Chest X-ray  Preoxygenation: Yes  Mask Ventilation: Ventilated by mask (1)  Airway Type: Cuffed  A...    Secured At 21 cm   Measured From Lips   ETT Placement Right   Secured By Commercial tube wayne   Site Assessment Dry   Cuff Pressure 30 cm H2O   Skin Assessment   Skin Assessment Clean, dry, & intact

## 2022-12-29 NOTE — CARE COORDINATION
LOS 7. Care managed by [facility], Vasc Surg. Septic shock w resp fail. Vent 40/5, Sedated, Pressors. CRRT. Plan to start FT per NG. From home w mom. MR and Spina Bifida- paraplegic and WC bound at baseline. Pall care consulting. Vasc states pt likely to need B AKAs and partial hand amputations if recovers. CM following.    Kalyn Curry RN

## 2022-12-29 NOTE — PROGRESS NOTES
12/29/22 0420   Patient Observation   Heart Rate 57   SpO2 96 %   Vent Information   Equipment Changed HME   Vent Mode AC/PC   Ventilator Settings   Resp Rate (Set) 14 bmp   PEEP/CPAP (cmH2O) 5   FiO2  40 %   Pressure Support (cm H2O) 0 cm H2O   Vent Patient Data (Readings)   Vt (Measured) 457 mL   Peak Inspiratory Pressure (cmH2O) 36 cmH2O   Rate Measured 20 br/min   Minute Volume (L/min) 6.23 Liters   Mean Airway Pressure (cmH2O) 19 cmH20   I:E Ratio 1:2.90   Flow Sensitivity 3 L/min   I Time/ I Time % 1.1 s   Backup Apnea On   Vent Alarm Settings   High Pressure (cmH2O) 45 cmH2O   Low Minute Volume (lpm) 2 L/min   Low Exhaled Vt (ml) 200 mL   RR High (bpm) 40 br/min   Apnea (secs) 20 secs   Additional Respiratoray Assessments   Humidification Source HME   Ambu Bag With Mask At Bedside Yes   Airway Clearance   Suction ET Tube   Suction Device Inline suction catheter   Sputum Method Obtained Endotracheal   Sputum Amount Moderate   Sputum Color/Odor Tan   Sputum Consistency Thick   ETT    Placement Date: 12/22/22   Present on Admission/Arrival: No  Placed By: Licensed provider  Placement Verified By: Auscultation;Colorimetric ETCO2 device; Chest X-ray  Preoxygenation: Yes  Mask Ventilation: Ventilated by mask (1)  Airway Type: Cuffed  A...    Secured At 21 cm   Measured From Lips   ETT Placement Right   Secured By Commercial tube wayne   Site Assessment Dry   Skin Assessment   Skin Assessment Clean, dry, & intact

## 2022-12-29 NOTE — PROGRESS NOTES
[facility]                                               Progress note    CC: AMS, septic shock  Summary:   Jackson Hawkins is being seen by nephrology for Acute kidney injury (MAYTE). She is a 49 y.o. female with aP signficiant for spina bifida, neurogenic bladder, hydrocephalus s/p  shunt who presented to the hospital 12/22/2022 with increasing somnolence. She was unarousable by the time she was in the ED and had a high grade fever. She was in septic shock with high pressor needs. Since admission she has received a large volume of fluids for resuscitation and she has now become increasingly edematous. Cr has doubled from < 0.5 to 1.0 and remains on pressors. .     Interval History  - seen at bedside  - started CRRT 12/26/2022  - remains on levo, dose de-escalating, started on midodrine 5 mg TID yesterday  -  mL, UF 3753 mL, net -ve ~ 2.4 L for yesterday  - BP improves with albumin infusion. Plan:   - GOC changed, planned for withdrawal of care. - no further recommendations. Martir Malagon MD  [facility]  Office: [phone]    Assessment:   MAYTE:  - 2/2 septic shock and resulting ATN  - baseline Cr < 0.5, indicative for sarcopenia  - Cr at time of consult was 1.0  - starting CRRT 12/26/2022 for volume management     Volume overload:  - grossly volume overloaded due to massive volume of fluids given for resucitation  - continues to requrie bicarb for acidosis management  - UO borderline  - address with CRRT     Septic shock  - pneumonia with purulence secretions seen on bronch  - irreversible ischemia of the LE due to pressors likely  - UTI with neurogenic bladder. ROS:   Review of systems is unobtainable due to current clinical state. PMH:   Past medical history, surgical history, social history, family history are reviewed and updated as appropriate. Reviewed current medication list.   Allergies reviewed and updated as needed.      PE:   Vitals:    12/29/22 1214 BP:    Pulse: 87   Resp:    Temp:    SpO2: 98%       General appearance: intubated. HEENT: no icterus, EOM intact, trachea midline. Neck : no masses, appears symmetrical and no JVD appreciated. Respiratory: ETT to vent bilateral equal chest rise. No wheeze, no crackles   Cardiovascular: Ausculation shows RRR and  4+ edema   Abdomen: abdomen is soft, non distended, no masses, no pain with palpation.   Musculoskeletal:  no joint swelling, no deformity, strength grossly normal.   Skin: no rashes, no induration, no tightening, no jaundice   Neuro:  AUDELIA      Lab Results   Component Value Date    CREATININE <0.5 (L) 12/29/2022    BUN 3 (L) 12/29/2022     (L) 12/29/2022    K 3.8 12/29/2022     12/29/2022    CO2 25 12/29/2022      Lab Results   Component Value Date    WBC 34.4 (H) 12/29/2022    HGB 9.2 (L) 12/29/2022    HCT 28.0 (L) 12/29/2022    MCV 81.2 12/29/2022    PLT 48 (L) 12/29/2022     Lab Results   Component Value Date    CALCIUM 7.6 (L) 12/29/2022    CAION 1.07 (L) 12/29/2022    PHOS 3.0 12/29/2022

## 2022-12-29 NOTE — PLAN OF CARE
Patient still demonstrating indications of the need to be restrained at this time. Visual safety checks performed every hour, and restraint monitoring performed every two hours. Patient has no signs of injuries from restraints at this time. Educated pt on the need for the restraints, no evidence of learning. Will continue to monitor.

## 2022-12-29 NOTE — PROGRESS NOTES
12/29/22 1214   Patient Observation   Heart Rate 87   SpO2 98 %   Breath Sounds   Right Upper Lobe Clear   Right Middle Lobe Diminished   Right Lower Lobe Diminished   Left Upper Lobe Clear   Left Lower Lobe Diminished   Vent Information   Vent Mode AC/VC   Ventilator Settings   Resp Rate (Set) 14 bmp   PEEP/CPAP (cmH2O) 5   FiO2  35 %   Pressure Support (cm H2O) 0 cm H2O   Insp Time (sec) 1.1 sec   Vent Patient Data (Readings)   Vt (Measured) 381 mL   Peak Inspiratory Pressure (cmH2O) 31 cmH2O   Rate Measured 14 br/min   Minute Volume (L/min) 5.32 Liters   Mean Airway Pressure (cmH2O) 11 cmH20   I:E Ratio 1:2.90   I Time/ I Time % 1.1 s   Vent Alarm Settings   High Pressure (cmH2O) 45 cmH2O   Low Minute Volume (lpm) 2 L/min   Low Exhaled Vt (ml) 200 mL   RR High (bpm) 40 br/min   Apnea (secs) 20 secs   Additional Respiratoray Assessments   Humidification Source HME   Ambu Bag With Mask At Bedside Yes   Airway Clearance   Suction ET Tube   Suction Device Inline suction catheter   Sputum Method Obtained Endotracheal   Sputum Amount Small   Sputum Color/Odor Tan   Sputum Consistency Thick   ETT    Placement Date: 12/22/22   Present on Admission/Arrival: No  Placed By: Licensed provider  Placement Verified By: Auscultation;Colorimetric ETCO2 device; Chest X-ray  Preoxygenation: Yes  Mask Ventilation: Ventilated by mask (1)  Airway Type: Cuffed  A...    Secured At 21 cm   Measured From Lips   ETT Placement Left   Secured By Commercial tube wayne

## 2022-12-29 NOTE — PLAN OF CARE
Problem: Pain  Goal: Verbalizes/displays adequate comfort level or baseline comfort level  Outcome: Progressing  Flowsheets (Taken 12/29/2022 0800)  Verbalizes/displays adequate comfort level or baseline comfort level: Assess pain using appropriate pain scale

## 2022-12-29 NOTE — PROGRESS NOTES
VASCULAR    Seen for leg ischemia  Remains on CRRT  Continue Levophed. Nonblanching cyanosis BLE and B fingers    A/P: Irreversible BLE and finger/hand ischemia   No plans for intervention at this time. As stated before would require B AKAs - possible partial hand amputations if she recovers.      Cielo Cantu

## 2022-12-29 NOTE — PROGRESS NOTES
1648 After discussion with Dr. Tamra Coello family decided to withdraw care and peruse comfort measures. [facility] notified, did not follow case, and asked to call back with cardiac time of death. Comfort care orders were placed. CRRT was stopped, meds were discontinued and patient was extubated. Family remains at bedside with patient. 1725 Pt has no apical pulse, no blood pressure and no spontaneous respirations. Verified by 2 RN's MAVERICK Urbina RN and Mary Summers RN. Time of death 1. Attending physician and consults called.

## 2022-12-29 NOTE — CONSULTS
Comprehensive Nutrition Assessment    Type and Reason for Visit:  Reassess, Consult    Nutrition Recommendations/Plan:   Recommend trophic TF of Vital AF at 10 ml/hr   Recommend 30 mL H20 flush q 4 hours. Monitor IVF infusion, Na labs and need for adjustments in water flush  When approved by MD, increase rate as tolerated by 10 mL/hr q 8 hours until goal of 50 mL/hr is met via O/G   Monitor TF tolerance (abd distention, bowel habits, N/V, cramping)  Monitor nutrition adequacy, pertinent labs, bowel habits, wt changes, and clinical progress     Malnutrition Assessment:  Malnutrition Status: At risk for malnutrition (Comment) (12/29/22 0959)    Context:  Acute Illness     Findings of the 6 clinical characteristics of malnutrition:  Energy Intake:  Mild decrease in energy intake (Comment)    Nutrition Assessment:    Follow up/ consult for TF order and management: Pt continue on vent support. On CRRT. Sedated on fentanyl and precedex, plan for sedation holiday today. Trophic TF started on 12/23, held since 12/26 d/t possible ileus. Plan for mineral oil enema today and trophic TF initiation. Abdominal XR showing still ileus and likely constipation. Vascular consulted for leg ischemia, pt would require B AKAs if she recovers, per vascular note. Spoke to RN, new updated weight is 84 lb on bed scale. Will adjust recommendations to reflect current weight. New TF recommendations included to promote tolerance. Pallative care following. Will continue to monitor. Nutrition Related Findings:    Phos 1.3. + BM today.  Wound Type: Stage III, Pressure Injury       Current Nutrition Intake & Therapies:    Average Meal Intake: NPO  Average Supplements Intake: NPO  Diet NPO  ADULT TUBE FEEDING; Orogastric; Peptide Based; Continuous; 10; No; 30; Q 4 hours  Current Tube Feeding (TF) Orders:  Feeding Route: Nasogastric  Formula: Peptide Based  Schedule: Continuous  Additives/Modulars: Protein  Goal TF & Flush Orders Provides: Vital AF at goal rate of 50 ml/hr to provide 1000 ml TV, 1200 kcals, 75 g protein and 811 ml free water. Free water flush 30 ml q 4 hours. Anthropometric Measures:  Height: 4' (121.9 cm)  Ideal Body Weight (IBW): 40 lbs (18 kg)       Current Body Weight: 84 lb (38.1 kg), 147.5 % IBW. Weight Source: Bed Scale  Current BMI (kg/m2): 25.6                       BMI Categories: Obese Class 1 (BMI 30.0-34. 9)    Estimated Daily Nutrient Needs:  Energy Requirements Based On: Kcal/kg (30-35 kcals/kg)  Weight Used for Energy Requirements: Current (38 kg)  Energy (kcal/day): 5685-6920  Weight Used for Protein Requirements: Current (1.5-2.2 g/kg)  Protein (g/day): 57-84 g  Method Used for Fluid Requirements: 1 ml/kcal    Nutrition Diagnosis:   Inadequate energy intake related to impaired respiratory function as evidenced by NPO or clear liquid status due to medical condition, intubation, nutrition support - enteral nutrition    Nutrition Interventions:   Food and/or Nutrient Delivery: Start Tube Feeding  Nutrition Education/Counseling: No recommendation at this time  Coordination of Nutrition Care: Continue to monitor while inpatient       Goals:  Previous Goal Met: No Progress toward Goal(s)  Goals: Initiate nutrition support, within 2 days       Nutrition Monitoring and Evaluation:   Behavioral-Environmental Outcomes: None Identified  Food/Nutrient Intake Outcomes: Enteral Nutrition Intake/Tolerance  Physical Signs/Symptoms Outcomes: Biochemical Data, Nutrition Focused Physical Findings, Weight    Discharge Planning:     Too soon to determine     Natasha Veras Wong [address], [address] [place] [address],   Contact: Office: [phone]; [address]: [phone]

## 2022-12-29 NOTE — PROGRESS NOTES
12/29/22 1330   Encounter Summary   Encounter Overview/Reason  Spiritual/Emotional Needs   Service Provided For: Patient and family together   Referral/Consult From: Nurse   Support System Family members   Last Encounter  12/29/22  ( provided prayer blanket and prayer; pt was alert and acknowledged questions)   Complexity of Encounter Moderate   Begin Time 1310   End Time  1330   Total Time Calculated 20 min   Encounter    Type Family Care   Spiritual/Emotional needs   Type Spiritual Support   Grief, Loss, and Adjustments   Type Anticipatory Grief   Assessment/Intervention/Outcome   Assessment Coping; Sad   Intervention Prayer (assurance of)/Pratt   Outcome Expressed Gratitude;Receptive   Plan and Referrals   Plan/Referrals Continue Support (comment)

## 2022-12-29 NOTE — SIGNIFICANT EVENT
I had a discussion again the family (POA - mother and sisters) about the prognosis and long-term treatment if patient were to overcome her critical illness. Family is in agreement that patient would not want a quality of life in long-term care and multiple surgeries for vascular issues. They believe patient has been through enough and want their daughter/sister to be made comfortable. We will change her code status to [facility] and pursue comfort measures.      Shola Horn MD  [facility] Pulmonary Critical Care

## 2022-12-29 NOTE — PROGRESS NOTES
Hospitalist Progress Note      PCP: Marla Mcgovern DO    Date of Admission: 12/22/2022    Chief Complaint: [address] Course:   49 y.o. female who presented to [facility] with AMS. Patient has a history of spinal bifida and mental retardation. She is wheel chair bound and her mother is her caregiver and decision maker. Per patient's mother, patient started seeming fatigued a few hours ago. This quickly progressed and patient started to seem somnolent. By the time she presented to the ED, patient was unarousable. She had a high fever and severe tachycardia. She regained consciousness once IVF were started. Patient has a neurogenic bladder and straight caths multiple times per day. She had evidence of UTI on a UA. Patient had good response to IVF but remains critically ill in ED. Subjective: intubated, sedated.  Family is gathering today to discuss goals of care     Medications:  Reviewed    Infusion Medications    sodium chloride      sodium chloride      dextrose 20 mL/hr at 12/29/22 1246    prismaSATE BGK 4/2.5 750 mL/hr at 12/29/22 [address] 4/2.5 500 mL/hr at 12/29/22 1230    prismaSATE BGK 4/2.5 500 mL/hr at 12/29/22 1230    fentaNYL 175 mcg/hr (12/29/22 1201)    dextrose      sodium chloride      dexmedetomidine Stopped (12/29/22 0308)    sodium chloride      norepinephrine 3 mcg/min (12/29/22 1200)     Scheduled Medications    vancomycin (VANCOCIN) intermittent dosing (placeholder)   Other RX Placeholder    midodrine  5 mg Orogastric TID WC    senna  1 tablet Per NG tube BID    cefTRIAXone (ROCEPHIN) IV  2,000 mg IntraVENous Q24H    docusate  100 mg Per NG tube BID    chlorhexidine  15 mL Mouth/Throat BID    carboxymethylcellulose PF  1 drop Both Eyes 6 times per day    collagenase   Topical Daily    methIMAzole  5 mg Oral Daily    sodium chloride flush  5-40 mL IntraVENous 2 times per day    pantoprazole  40 mg IntraVENous Daily     PRN Meds: sodium chloride, midazolam, Lip Balm, sodium chloride, potassium chloride, magnesium sulfate, calcium gluconate **OR** calcium gluconate **OR** calcium gluconate **OR** calcium gluconate, sodium phosphate IVPB **OR** sodium phosphate IVPB **OR** sodium phosphate IVPB **OR** sodium phosphate IVPB, glucose, dextrose bolus **OR** dextrose bolus, glucagon (rDNA), dextrose, sodium chloride flush, sodium chloride, acetaminophen **OR** acetaminophen, sodium chloride      Intake/Output Summary (Last 24 hours) at 12/29/2022 1327  Last data filed at 12/29/2022 1200  Gross per 24 hour   Intake 2361.31 ml   Output 3603 ml   Net -1241.69 ml         Physical Exam Performed:    BP (!) 92/54   Pulse 87   Temp 97.8 °F (36.6 °C) (Bladder)   Resp 14   Ht 4' (1.219 m)   Wt 106 lb 4.2 oz (48.2 kg)   SpO2 98%   BMI 32.43 kg/m²     General appearance:  intubated, sedated  HEENT:  Normal cephalic, atraumatic without obvious deformity. Pupils equal, round, and reactive to light. Extra ocular muscles intact. Conjunctivae/corneas clear. Neck: Supple, with full range of motion. No jugular venous distention. Trachea midline. Respiratory:  Normal respiratory effort. Clear to auscultation, bilaterally without Rales/Wheezes/Rhonchi. Cardiovascular:  RRR with normal S1/S2 without murmurs, rubs or gallops. Abdomen: Soft, non-tender, non-distended with normal bowel sounds. Musculoskeletal:  Full range of motion without deformity. Chronic LE contractures, poor tone. Feet cyanotic with diffuse mottling. BUE dusky   Neurologic: Intubated and sedataed        Labs:   Recent Labs     12/27/22  0535 12/27/22  1210 12/28/22  0521 12/29/22  0334 12/29/22  0657 12/29/22  1252   WBC 17.9*  --  27.4*  --  34.4*  --    HGB 6.1*   < > 7.7* 7.0* 6.5* 9.2*   HCT 18.4*   < > 23.8*  --  20.6* 28.0*   PLT 70*  --  47*  --  48*  --     < > = values in this interval not displayed.        Recent Labs     12/28/22  1830 12/29/22  0024 12/29/22  0657    135* 136   K 3.8 3.6 3.9    104 102   CO2 25 26 26   BUN 6* 5* 4*   CREATININE <0.5* <0.5* <0.5*   CALCIUM 7.2* 7.7* 7.9*   PHOS 2.5 1.6* 1.3*       Recent Labs     12/28/22  1830 12/29/22  0024 12/29/22  0657   AST 97* 84* 85*   * 140* 136*   BILITOT 1.6* 2.1* 2.9*   ALKPHOS 156* 156* 165*       No results for input(s): INR in the last 72 hours. No results for input(s): Rhae Childes in the last 72 hours. Urinalysis:      Lab Results   Component Value Date/Time    NITRU Negative 12/22/2022 03:05 PM    WBCUA >100 12/22/2022 03:05 PM    BACTERIA 4+ 12/22/2022 03:05 PM    RBCUA None seen 12/22/2022 03:05 PM    BLOODU LARGE 12/22/2022 03:05 PM    SPECGRAV >=1.030 12/22/2022 03:05 PM    GLUCOSEU Negative 12/22/2022 03:05 PM       Radiology:  XR ABDOMEN (KUB) (SINGLE AP VIEW)   Final Result   Limited abdominal radiograph due to positioning. Dense stool in the colon   suggesting a pattern of ileus or constipation. XR CHEST PORTABLE   Final Result   Increasing multifocal ground-glass airspace opacities. ARDS or pulmonary   edema favored. Developing pneumonitis cannot be excluded. XR CHEST PORTABLE   Final Result   Central venous catheter is in the mid SVC      The endotracheal tube has been retracted. It is slightly beyond the thoracic   inlet. XR ABDOMEN (KUB) (SINGLE AP VIEW)   Final Result   Limited study. Distended loops of bowel are noted. This has worsened. Findings could be related to ileus versus bowel obstruction. XR CHEST PORTABLE   Final Result   1. Right base opacity with small pleural effusion. If patient has clinical   symptoms of infection, differential would include pneumonia. In the absence   of infectious symptoms, atelectasis can have the same appearance. .   2. ETT tip at the level of the susanne. CT ABDOMEN PELVIS WO CONTRAST Additional Contrast? None   Final Result   1.  Technically limited CT due to the patient's underlying medical condition   and artifact from positioning, hardware and lack of IV contrast.   2. Probable sacral decubitus ulcer for which correlation with physical exam   is advised. 3. Questionable inflammation at the left hip joint which may simply be due to   the patient's underlying medical condition and stasis. Osteoarthritis or   septic pattern of arthritis cannot be definitively excluded. 4. Heterogeneity of the right kidney that is difficult to evaluate. An   underlying pattern of inflammation/pyelonephritis may be considered in the   appropriate clinical setting. No obvious sign of obstructive uropathy. 5. Bibasilar pleural fluid and airspace changes. Correlate with pulmonary   symptoms. XR ABDOMEN (KUB) (SINGLE AP VIEW)   Final Result   Enteric tube is in the stomach         XR CHEST PORTABLE   Final Result   Endotracheal tube is at the susanne         XR CHEST PORTABLE   Final Result   No radiographic evidence of acute pulmonary disease.              IP CONSULT TO HOSPITALIST  IP CONSULT TO CRITICAL CARE  IP CONSULT TO CRITICAL CARE  IP CONSULT TO VASCULAR SURGERY  IP CONSULT TO NEPHROLOGY  IP CONSULT TO PALLIATIVE CARE  PHARMACY TO DOSE VANCOMYCIN  IP CONSULT TO DIETITIAN    Assessment/Plan:    Active Hospital Problems    Diagnosis     Ileus ([facility] [address] [K56.7]      Priority: Medium    Dialysis patient [facility]) [Z99.2]      Priority: Medium    Septic shock ([address] [A41.9, R65.21]      Priority: Medium    Thrombocytopenia ([address] [D69.6]      Priority: Medium    Bacteremia due to Escherichia coli [R78.81, B96.20]      Priority: Medium    Hypoglycemia [E16.2]      Priority: Medium    Shock liver [K72.00]      Priority: Medium    Pulmonary infiltrates [R91.8]      Priority: Medium    Severe sepsis ([address] [A41.9, R65.20]      Priority: Medium    Other secondary scoliosis, 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 Lactate 10.2 on admission  - continue IVF  - blood, urine cultures positive of E coli  - suspect pyelonephritis based on CT abd/pel and culture results  - wean pressors as able  - continue merrem    Possible pneumonia   Bronch with mucopurulent plugs  Cultures with candida  Continue merrem     Acute hypoxic respiratory failure  - 2/2 above  - pulmonology consulted  - intubated 12/22  - vent management per pulm  Bronch 12/26 - thick mucous plugs      Acute metabolic encephalopathy  - 2/2 above  - supportive care     Lactic acidosis  - severe, 2/2 sepsis  - continue bicarb gtt  - vascular surgery consulted for severe mottling  - no surgical intervention warranted  - continue to monitor    MAYTE  - 2/2 shock  - nephrology consulted  - Cr trending up consistently but slowly likely related to extremely small mass  - continues with oliguria  Significant positive fluid balance. Nephrology initiated CRRT 12/26    Limb ischemia  - due to shock and pressor use  - vascular surgery consulted  - no plan for surgical intervention   Irreversible BLE ischemia   If patient survives will require B AKAs     Shock liver - Continue to follow LFTs, downtrending today.       Hyperthyroidism  - TSH WNL  - continue home methimazole     Anemia  Thrombocytopenia  - Heparin was DC  HIT ordered   - chronic, stable  - holding home Fe supplement  - continue to monitor  Hgb 6.1 today, transfuse pRBC  Transfuse for Hgb < 7     HTN  - hypotension on admission  - holding metoprolol     Spina Bifida  - paraplegic at baseline  - supportive care    Discussed with mother.  Ongoing discussions on goal of care     DVT Prophylaxis: SCDs due to thrombocytopenia, HIT panel pending   Diet: Diet NPO  ADULT TUBE FEEDING; Orogastric; Peptide Based; Continuous; 10; No; 30; Q 4 hours  Code Status: DNR-CCA  PT/OT Eval Status: ordered    Dispo - ICU, ongoing discussions on goals of care    Appropriate for A1 Discharge Unit: June Phillips DO

## 2022-12-29 NOTE — PROGRESS NOTES
Shift: 1201-7463    Admitting diagnosis: Sepsis    Presentation to hospital: Mother called EMS when she found pt with AMS     Surgery: no     Nursing assessment at handoff  stable    Emergency Contact/POA:Mom Lola Starr  Family updated: yes - Mom     Most recent vitals: BP 99/63   Pulse 92   Temp 98.2 °F (36.8 °C) (Bladder)   Resp 14   Ht 4' (1.219 m)   Wt 106 lb 4.2 oz (48.2 kg)   SpO2 97%   BMI 32.43 kg/m²      Rhythm: Normal Sinus Rhythm 70-80's   NC/HFNC- NA   Respiratory support: - Ventilator Settings:    Vt (Set, mL): 400 mL  Resp Rate (Set): 14 bmp  FiO2 : 40 %    PEEP/CPAP (cmH2O): 5       Vent days: Day 6    Increased O2 requirements: no    Admission weight Weight: 59 lb (26.8 kg)  Today's weight   Wt Readings from Last 1 Encounters:   12/26/22 106 lb 4.2 oz (48.2 kg)         UOP >30ml/hr: no    Byrnes need assessed each shift: yes, pt has 4+ labial edema and on CRRT    Restraints: No   Order current and documentation up to date? Yes    Lines/Drains  LDA Insertion Date Discontinued Date Dressing Changes   PIV       TLC  12/22 rt fem     Arterial       Byrnes  12/22     Vas Cath 12/26 LJ     ETT  12/22 #7.5     Surgical drains        Night Shift Hospitalist Interventions    Problem(Brief) Date Time Intervention Physician contacted   H 6.1 12-27 0700 transfuse adike                                        Drip rates at handoff:    sodium chloride      dextrose 20 mL/hr at 12/29/22 0144    prismaSATE BGK 4/2.5 750 mL/hr at 12/28/22 1619    prismaSATE BGK 4/2.5 500 mL/hr at 12/28/22 1619    prismaSATE BGK 4/2.5 500 mL/hr at 12/28/22 1619    fentaNYL 175 mcg/hr (12/29/22 0455)    dextrose      sodium chloride      dexmedetomidine Stopped (12/29/22 0308)    sodium chloride      norepinephrine 5 mcg/min (12/29/22 0310)       Hospital Course Daily Updates:  Admit Day# 4  12/26:. .................................................................................. Sis Remedies     Admit Day #5 12/27 Day  -1unit PRBC given  -Started D10% infusion @ 10cc/hr, still NPO  -Full code to DNR-CCA  -Albumin 25g given  -still on CRRT w/ -185 fluid balance for 12hrs  -still no BM  - Vent changes made decreased TV and RR  Night shift- weaned levo to 4mcg. Tolerating CRRT well    Admit Day #6 (12/28)  - Enema x 2 given  - meeting with palliative care to discuss goals of care  - removing fluid with CRRT  - sedation holiday  - blood cultures sent    Admit Day #6 12/28 Nights  -Precedex gtt. Off D/T Bradycardia  -CRRT continued  -Central line dressings changed  -Medium watery BM      Lab Data:   CBC:   Recent Labs     12/27/22  0535 12/27/22  1210 12/28/22  0521 12/29/22  0334   WBC 17.9*  --  27.4*  --    HGB 6.1* 8.7* 7.7* 7.0*   HCT 18.4* 26.2* 23.8*  --    MCV 75.6*  --  81.2  --    PLT 70*  --  47*  --        BMP:    Recent Labs     12/28/22  1830 12/29/22  0024    135*   K 3.8 3.6   CO2 25 26   BUN 6* 5*   CREATININE <0.5* <0.5*       LIVR:   Recent Labs     12/28/22  1830 12/29/22  0024   AST 97* 84*   * 140*       PT/INR:   Recent Labs     12/28/22  1830 12/29/22  0024   PROT 3.9* 4.1*       APTT: No results for input(s): APTT in the last 72 hours.   ABG:   Recent Labs     12/28/22  0750 12/29/22  0334   PHART 7.551* 7.518*   NSE3RPB 30.2* 31.1*   PO2ART 133.7* 133.6*       Consults (if GI or Nephrology- which group?)-  Corina Waddell nephrology, vascular

## 2022-12-29 NOTE — CONSULTS
Pharmacy Note  Vancomycin Consult    Zeny Landeros is a 49 y.o. female started on Vancomycin for Sepsis; consult received from Dr. Leilani Sheridan to manage therapy. Also receiving the following antibiotics: Ceftriaxone. Allergies:  Patient has no known allergies. Tmax: 97.9    Recent Labs     12/29/22  0024 12/29/22  0657   CREATININE <0.5* <0.5*       Recent Labs     12/28/22  0521 12/29/22  0657   WBC 27.4* 34.4*       Estimated Creatinine Clearance: 84 mL/min (based on SCr of 0.5 mg/dL). Intake/Output Summary (Last 24 hours) at 12/29/2022 0812  Last data filed at 12/29/2022 0806  Gross per 24 hour   Intake 1653.85 ml   Output 4035 ml   Net -2381.15 ml       Wt Readings from Last 1 Encounters:   12/26/22 106 lb 4.2 oz (48.2 kg)         Body mass index is 32.43 kg/m². Culture Date      Source                       Results  12/22                  Blood                        E. Coli  12/23                  Urine                       Gram neg geovanny     Loading dose (critically ill or in ICU, require dialysis or renal replacement therapy): Vancomycin 25 mg/kg IVPB x 1 (maximum 2500 mg). Patient on CRRT, will pulse dose     Assessment/Plan:  Will initiate Vancomycin with a one time loading dose of 1250 mg x1. Vancomycin level ordered for 12/30 1000. Patient on CRRT, will pulse dose for now. Timing of trough level will be determined based on culture results, renal function, and clinical response. Thank you for the consult.   Vane Titus, PharmD 12/29/2022 8:15 AM

## 2022-12-30 NOTE — PROGRESS NOTES
Wasted 25mL of Fentanyl  with Ana Milling. Med disposed of into the Rx Destroyer per protocol.     Primary Nurse eSignature: Electronically signed by Nathaniel Talamantes RN on 12/29/22 at 7:43 PM EST    **SHARE this note so that the co-signing nurse is able to place an eSignature**    Co-signer eSignature: Electronically signed by Alejandra Yin RN on 12/29/22 at 7:50 PM EST

## 2022-12-31 LAB
EKG ATRIAL RATE: 80 BPM
EKG DIAGNOSIS: NORMAL
EKG P-R INTERVAL: 132 MS
EKG Q-T INTERVAL: 408 MS
EKG QRS DURATION: 66 MS
EKG QTC CALCULATION (BAZETT): 470 MS
EKG R AXIS: -4 DEGREES
EKG T AXIS: 16 DEGREES
EKG VENTRICULAR RATE: 80 BPM

## 2022-12-31 NOTE — DISCHARGE SUMMARY
Hospital Medicine Discharge Summary    Patient ID: Osman Mike      Patient's PCP: Alea Palencia DO    Admit Date: 12/22/2022     Discharge Date: 12/29/2022      Admitting Provider: Silviano Jones MD     Discharge Provider: Evie Noguera DO     Discharge Diagnoses: Active Hospital Problems    Diagnosis     Ileus ([facility] [address] [K56.7]      Priority: Medium    Dialysis patient 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]      Priority: Medium    Lactic acidosis [E87.20]      Priority: Medium    Pyuria [R82.81]      Priority: Medium    Elevated alkaline phosphatase level [R74.8]      Priority: Medium    Metabolic acidemia [U64.25]      Priority: Medium    Elevated procalcitonin [R79.89]      Priority: Medium    Bandemia [D72.825]      Priority: Medium    SVT (supraventricular tachycardia) (HCC) [I47.1]      Priority: Medium    Hyperthyroidism [E05.90]      Priority: Medium    Spina bifida ([facility] [address] [Q05.9]        The patient was seen and examined on day of discharge and this discharge summary is in conjunction with any daily progress note from day of discharge. Hospital Course:     49 y.o. female who presented to [facility] with AMS. Patient has a history of spinal bifida and cognitive impairment. She is wheel chair bound and her mother is her caregiver and decision maker. Per patient's mother, patient started seeming fatigued a few hours ago. This quickly progressed and patient started to seem somnolent.  By the time she presented to the ED, patient was unarousable. She had a high fever and severe tachycardia. She regained consciousness once IVF were started. Patient has a neurogenic bladder and straight caths multiple times per day. She had evidence of UTI on a UA. Patient had good response to IVF but remains critically ill in ED. Patient went [facility] cares     Septic shock  pneumonia   Acute hypoxic respiratory failure  Acute metabolic encephalopathy  Lactic acidosis  MAYTE   Nephrology initiated CRRT 12/26  Limb ischemia  Shock liver   Hyperthyroidism  Anemia  Thrombocytopenia  - Heparin was DC  HTN  Spina Bifida        Physical Exam Performed:     /69   Pulse (!) 104   Temp 98 °F (36.7 °C) (Bladder)   Resp 14   Ht 4' (1.219 m)   Wt 106 lb 4.2 oz (48.2 kg)   SpO2 99%   BMI 32.43 kg/m²       General appearance:  intubated, sedated  HEENT:  Normal cephalic, atraumatic without obvious deformity. Pupils equal, round, and reactive to light. Extra ocular muscles intact. Conjunctivae/corneas clear. Neck: Supple, with full range of motion. No jugular venous distention. Trachea midline. Respiratory:  Normal respiratory effort. Clear to auscultation, bilaterally without Rales/Wheezes/Rhonchi. Cardiovascular:  RRR with normal S1/S2 without murmurs, rubs or gallops. Abdomen: Soft, non-tender, non-distended with normal bowel sounds. Musculoskeletal:  Full range of motion without deformity. Chronic LE contractures, poor tone. Feet cyanotic with diffuse mottling. BUE dusky   Neurologic: Intubated and sedataed      Labs:  For convenience and continuity at follow-up the following most recent labs are provided:      CBC:    Lab Results   Component Value Date/Time    WBC 34.4 12/29/2022 06:57 AM    HGB 9.2 12/29/2022 12:52 PM    HCT 28.0 12/29/2022 12:52 PM    PLT 48 12/29/2022 06:57 AM       Renal:    Lab Results   Component Value Date/Time     12/29/2022 12:52 PM    K 3.8 12/29/2022 12:52 PM     12/29/2022 12:52 PM    CO2 25 12/29/2022 12:52 PM BUN 3 12/29/2022 12:52 PM    CREATININE <0.5 12/29/2022 12:52 PM    CALCIUM 7.6 12/29/2022 12:52 PM    PHOS 3.0 12/29/2022 12:52 PM         Significant Diagnostic Studies    Radiology:   XR ABDOMEN (KUB) (SINGLE AP VIEW)   Final Result   Limited abdominal radiograph due to positioning. Dense stool in the colon   suggesting a pattern of ileus or constipation. XR CHEST PORTABLE   Final Result   Increasing multifocal ground-glass airspace opacities. ARDS or pulmonary   edema favored. Developing pneumonitis cannot be excluded. XR CHEST PORTABLE   Final Result   Central venous catheter is in the mid SVC      The endotracheal tube has been retracted. It is slightly beyond the thoracic   inlet. XR ABDOMEN (KUB) (SINGLE AP VIEW)   Final Result   Limited study. Distended loops of bowel are noted. This has worsened. Findings could be related to ileus versus bowel obstruction. XR CHEST PORTABLE   Final Result   1. Right base opacity with small pleural effusion. If patient has clinical   symptoms of infection, differential would include pneumonia. In the absence   of infectious symptoms, atelectasis can have the same appearance. .   2. ETT tip at the level of the susanne. CT ABDOMEN PELVIS WO CONTRAST Additional Contrast? None   Final Result   1. Technically limited CT due to the patient's underlying medical condition   and artifact from positioning, hardware and lack of IV contrast.   2. Probable sacral decubitus ulcer for which correlation with physical exam   is advised. 3. Questionable inflammation at the left hip joint which may simply be due to   the patient's underlying medical condition and stasis. Osteoarthritis or   septic pattern of arthritis cannot be definitively excluded. 4. Heterogeneity of the right kidney that is difficult to evaluate. An   underlying pattern of inflammation/pyelonephritis may be considered in the   appropriate clinical setting.   No obvious sign of obstructive uropathy. 5. Bibasilar pleural fluid and airspace changes. Correlate with pulmonary   symptoms. XR ABDOMEN (KUB) (SINGLE AP VIEW)   Final Result   Enteric tube is in the stomach         XR CHEST PORTABLE   Final Result   Endotracheal tube is at the susanne         XR CHEST PORTABLE   Final Result   No radiographic evidence of acute pulmonary disease. Consults:     IP CONSULT TO HOSPITALIST  IP CONSULT TO CRITICAL CARE  IP CONSULT TO CRITICAL CARE  IP CONSULT TO VASCULAR SURGERY  IP CONSULT TO NEPHROLOGY  IP CONSULT TO PALLIATIVE CARE  PHARMACY TO DOSE VANCOMYCIN  IP CONSULT TO DIETITIAN    Disposition:           Time Spent on discharge: 38 minutes in the examination, evaluation, counseling and review of medications and discharge plan. Signed:    Dmitry Wright DO   2022      Thank you Heaven Reynolds DO for the opportunity to be involved in this patient's care. If you have any questions or concerns, please feel free to contact me at [phone].

## 2023-01-01 LAB
BLOOD CULTURE, ROUTINE: NORMAL
CULTURE, BLOOD 2: NORMAL

## (undated) DEVICE — JAW BRA SURG TMJ VELC CLSR SYS V CUT SUPP STRP 2 ZIPLOK BG

## (undated) DEVICE — GLOVE SURG SZ 75 L12IN FNGR THK94MIL STD WHT LTX FREE

## (undated) DEVICE — GLOVE,SURG,SENSICARE,ALOE,LF,PF,7: Brand: MEDLINE

## (undated) DEVICE — SYRINGE,EAR/ULCER, 2 OZ, STERILE: Brand: MEDLINE

## (undated) DEVICE — BUR DENT TAPR 2.1 MM OPERATIVE FLAT END HNDPC 703

## (undated) DEVICE — YANKAUER,BULB TIP,W/O VENT,RIGID,STERILE: Brand: MEDLINE

## (undated) DEVICE — SET GRAV VENT NVENT CK VLV 3 NDL FREE PRT 10 GTT

## (undated) DEVICE — GLOVE SURG SZ 8 L12IN FNGR THK94MIL STD WHT LTX FREE

## (undated) DEVICE — TUBING, SUCTION, 3/16" X 12', STRAIGHT: Brand: MEDLINE

## (undated) DEVICE — SOLUTION IV IRRIG 500ML 0.9% SODIUM CHL 2F7123

## (undated) DEVICE — SOLUTION IV 1000ML LAC RINGERS PH 6.5 INJ USP VIAFLX PLAS

## (undated) DEVICE — MINOR SET UP PACK: Brand: MEDLINE INDUSTRIES, INC.

## (undated) DEVICE — SUTURE CHROMIC GUT SZ 3-0 L27IN ABSRB BRN L26MM SH 1/2 CIR G122H

## (undated) DEVICE — CATHETER IV 20GA L1.25IN PNK FEP SFTY STR HUB RADPQ DISP

## (undated) DEVICE — SHEET,DRAPE,53X77,STERILE: Brand: MEDLINE

## (undated) DEVICE — INTENDED FOR TISSUE SEPARATION, AND OTHER PROCEDURES THAT REQUIRE A SHARP SURGICAL BLADE TO PUNCTURE OR CUT.: Brand: BARD-PARKER ® STAINLESS STEEL BLADES

## (undated) DEVICE — GLOVE SURG SZ 65 L12IN FNGR THK94MIL STD WHT LTX FREE

## (undated) DEVICE — GOWN,SIRUS,NON REINFRCD,LARGE,SET IN SL: Brand: MEDLINE

## (undated) DEVICE — MINOR SET UP PK

## (undated) DEVICE — 3M™ TEGADERM™ TRANSPARENT FILM DRESSING FRAME STYLE, 1624W, 2-3/8 IN X 2-3/4 IN (6 CM X 7 CM), 100/CT 4CT/CASE: Brand: 3M™ TEGADERM™

## (undated) DEVICE — MEDI-VAC NON-CONDUCTIVE SUCTION TUBING: Brand: CARDINAL HEALTH

## (undated) DEVICE — SET ADMIN PRIMING 7ML L30IN 7.35LB 20 GTT 2ND RLER CLMP

## (undated) DEVICE — SPONGE,LAP,4"X18",XR,ST,5/PK,40PK/CS: Brand: MEDLINE INDUSTRIES, INC.